# Patient Record
Sex: FEMALE | Race: WHITE | Employment: OTHER | URBAN - METROPOLITAN AREA
[De-identification: names, ages, dates, MRNs, and addresses within clinical notes are randomized per-mention and may not be internally consistent; named-entity substitution may affect disease eponyms.]

---

## 2017-01-05 NOTE — TELEPHONE ENCOUNTER
Patient called to get a refill on her Vyvanse. She only has 9 pills left and her next appt with Dr. Ramiro Matos is on 1/16/17. Either she will  prescription or her  Joaquim Alegre will. Please call patient when prescription is ready.  [phone]

## 2017-01-06 ENCOUNTER — TELEPHONE (OUTPATIENT)
Dept: NUTRITION/OBESITY MEDICINE | Facility: HOSPITAL | Age: 57
End: 2017-01-06

## 2017-01-16 ENCOUNTER — APPOINTMENT (OUTPATIENT)
Dept: NUTRITION/OBESITY MEDICINE | Facility: HOSPITAL | Age: 57
End: 2017-01-16
Attending: INTERNAL MEDICINE
Payer: MEDICARE

## 2017-01-24 ENCOUNTER — OFFICE VISIT (OUTPATIENT)
Dept: NUTRITION/OBESITY MEDICINE | Facility: HOSPITAL | Age: 57
End: 2017-01-24
Attending: INTERNAL MEDICINE
Payer: MEDICARE

## 2017-01-24 VITALS
DIASTOLIC BLOOD PRESSURE: 82 MMHG | HEIGHT: 61 IN | SYSTOLIC BLOOD PRESSURE: 111 MMHG | WEIGHT: 226 LBS | RESPIRATION RATE: 18 BRPM | HEART RATE: 96 BPM | BODY MASS INDEX: 42.67 KG/M2 | OXYGEN SATURATION: 96 %

## 2017-01-24 DIAGNOSIS — I10 HTN (HYPERTENSION), BENIGN: Primary | ICD-10-CM

## 2017-01-24 DIAGNOSIS — Z51.81 ENCOUNTER FOR THERAPEUTIC DRUG MONITORING: ICD-10-CM

## 2017-01-24 DIAGNOSIS — B37.2 INTERTRIGINOUS CANDIDIASIS: ICD-10-CM

## 2017-01-24 DIAGNOSIS — E66.01 MORBID OBESITY WITH BMI OF 45.0-49.9, ADULT (HCC): ICD-10-CM

## 2017-01-24 DIAGNOSIS — R53.82 CHRONIC FATIGUE: ICD-10-CM

## 2017-01-24 DIAGNOSIS — E78.2 MIXED HYPERLIPIDEMIA: ICD-10-CM

## 2017-01-24 DIAGNOSIS — K21.9 GASTROESOPHAGEAL REFLUX DISEASE WITHOUT ESOPHAGITIS: ICD-10-CM

## 2017-01-24 DIAGNOSIS — F50.81 BINGE EATING DISORDER: ICD-10-CM

## 2017-01-24 DIAGNOSIS — G47.33 OBSTRUCTIVE SLEEP APNEA SYNDROME: ICD-10-CM

## 2017-01-24 DIAGNOSIS — E55.9 VITAMIN D DEFICIENCY: ICD-10-CM

## 2017-01-24 PROCEDURE — 36415 COLL VENOUS BLD VENIPUNCTURE: CPT | Performed by: INTERNAL MEDICINE

## 2017-01-24 PROCEDURE — 99214 OFFICE O/P EST MOD 30 MIN: CPT | Performed by: INTERNAL MEDICINE

## 2017-01-24 PROCEDURE — 85025 COMPLETE CBC W/AUTO DIFF WBC: CPT | Performed by: INTERNAL MEDICINE

## 2017-01-24 PROCEDURE — 80053 COMPREHEN METABOLIC PANEL: CPT | Performed by: INTERNAL MEDICINE

## 2017-01-24 PROCEDURE — 87086 URINE CULTURE/COLONY COUNT: CPT | Performed by: INTERNAL MEDICINE

## 2017-01-24 PROCEDURE — 81001 URINALYSIS AUTO W/SCOPE: CPT | Performed by: INTERNAL MEDICINE

## 2017-01-24 RX ORDER — DEXTROAMPHETAMINE SULFATE 10 MG/1
30 TABLET ORAL DAILY
Qty: 180 TABLET | Refills: 0 | Status: SHIPPED | OUTPATIENT
Start: 2017-01-24 | End: 2017-01-24

## 2017-01-24 RX ORDER — NYSTATIN 100000 [USP'U]/G
1 POWDER TOPICAL 4 TIMES DAILY
Qty: 30 G | Refills: 1 | Status: SHIPPED | OUTPATIENT
Start: 2017-01-24 | End: 2018-08-13

## 2017-01-24 RX ORDER — DEXTROAMPHETAMINE SULFATE 10 MG/1
30 TABLET ORAL DAILY
Qty: 180 TABLET | Refills: 0 | Status: SHIPPED | OUTPATIENT
Start: 2017-02-24 | End: 2017-03-24

## 2017-01-24 NOTE — PROGRESS NOTES
[address] BARIATRIC AND WEIGHT LOSS CLINIC  [address] [place] [address]  Dept: [phone]     Date:   2016    Patient:  Jose L Holbrook  :      1960  MRN:      [id number]    Chief Complaint:  Patient presents with:   Ayan Garcia controled - has issue with edema in lower extremities   • Neuropathy (HCC)      toes and left foot   • Seizure disorder (HCC)      olefactory seizures history not since age 32   • Osteoarthritis      generalized   • Back problem      SPASMS HERNIATED DISC Sulfate IR 30 MG Oral Tab Take 1 tablet (30 mg total) by mouth every 4 (four) hours as needed. Disp: 120 tablet Rfl: 0   Lisdexamfetamine Dimesylate (VYVANSE) 50 MG Oral Cap Take 1 capsule (50 mg total) by mouth every morning.  Disp: 30 capsule Rfl: 0   alp by mouth nightly. Disp: 145 tablet Rfl: 3   Atorvastatin Calcium (LIPITOR) 20 MG Oral Tab Take 20 mg by mouth daily. Disp:  Rfl:      Allergies:  Latex; Tequin;  Adhesive Tape; Imitrex     Social History:      Social History   Marital Status:   Spo 1 Type of snacks: black berries  · Amount of soda consumption per day:  0  · Amount of water (in ounces) per day:  64  · Drinking between meals only:  yes  · Toughest challenge:  Exercise due to pain    Nutritional Goals  Limit carbohydrates to 100 gms per the last clinic visit. There has not been any increase in hyper-somnolence. DEPRESSION:  The patient states that her depression has been relatively well controlled.   she denies any recent changes in her irritability, concentration, sleep, libido or moo

## 2017-02-13 PROCEDURE — 86735 MUMPS ANTIBODY: CPT | Performed by: NURSE PRACTITIONER

## 2017-02-13 PROCEDURE — 36415 COLL VENOUS BLD VENIPUNCTURE: CPT | Performed by: NURSE PRACTITIONER

## 2017-03-24 ENCOUNTER — OFFICE VISIT (OUTPATIENT)
Dept: SURGERY | Facility: CLINIC | Age: 57
End: 2017-03-24

## 2017-03-24 VITALS
RESPIRATION RATE: 16 BRPM | DIASTOLIC BLOOD PRESSURE: 77 MMHG | BODY MASS INDEX: 44.2 KG/M2 | SYSTOLIC BLOOD PRESSURE: 130 MMHG | HEIGHT: 61 IN | WEIGHT: 234.13 LBS | OXYGEN SATURATION: 98 % | HEART RATE: 105 BPM

## 2017-03-24 DIAGNOSIS — K21.9 GASTROESOPHAGEAL REFLUX DISEASE WITHOUT ESOPHAGITIS: ICD-10-CM

## 2017-03-24 DIAGNOSIS — Z51.81 ENCOUNTER FOR THERAPEUTIC DRUG MONITORING: ICD-10-CM

## 2017-03-24 DIAGNOSIS — E78.2 MIXED HYPERLIPIDEMIA: ICD-10-CM

## 2017-03-24 DIAGNOSIS — Z99.89 OSA ON CPAP: ICD-10-CM

## 2017-03-24 DIAGNOSIS — I10 HTN (HYPERTENSION), BENIGN: Primary | ICD-10-CM

## 2017-03-24 DIAGNOSIS — R53.82 CHRONIC FATIGUE: ICD-10-CM

## 2017-03-24 DIAGNOSIS — E66.01 MORBID OBESITY WITH BMI OF 45.0-49.9, ADULT (HCC): ICD-10-CM

## 2017-03-24 DIAGNOSIS — G47.33 OSA ON CPAP: ICD-10-CM

## 2017-03-24 PROCEDURE — 99214 OFFICE O/P EST MOD 30 MIN: CPT | Performed by: INTERNAL MEDICINE

## 2017-03-24 RX ORDER — FENTANYL 25 UG/H
PATCH TRANSDERMAL
Refills: 0 | COMMUNITY
Start: 2017-02-09 | End: 2017-05-12

## 2017-03-24 RX ORDER — TRIAMCINOLONE ACETONIDE 0.1 %
PASTE (GRAM) DENTAL
Refills: 2 | COMMUNITY
Start: 2017-02-23 | End: 2017-05-12

## 2017-03-24 RX ORDER — DEXTROAMPHETAMINE SULFATE 10 MG/1
30 TABLET ORAL DAILY
Qty: 180 TABLET | Refills: 0 | Status: SHIPPED | OUTPATIENT
Start: 2017-03-24 | End: 2017-03-24

## 2017-04-04 PROBLEM — E78.2 MIXED HYPERLIPIDEMIA: Status: ACTIVE | Noted: 2017-04-04

## 2017-04-27 ENCOUNTER — HOSPITAL (OUTPATIENT)
Dept: OTHER | Age: 57
End: 2017-04-27
Attending: INTERNAL MEDICINE

## 2017-05-09 ENCOUNTER — OFFICE VISIT (OUTPATIENT)
Dept: SURGERY | Facility: CLINIC | Age: 57
End: 2017-05-09

## 2017-05-09 VITALS
HEIGHT: 61 IN | DIASTOLIC BLOOD PRESSURE: 80 MMHG | WEIGHT: 221 LBS | BODY MASS INDEX: 41.72 KG/M2 | SYSTOLIC BLOOD PRESSURE: 126 MMHG

## 2017-05-09 DIAGNOSIS — I10 HTN (HYPERTENSION), BENIGN: Primary | ICD-10-CM

## 2017-05-09 DIAGNOSIS — Z99.89 OSA ON CPAP: ICD-10-CM

## 2017-05-09 DIAGNOSIS — R53.82 CHRONIC FATIGUE: ICD-10-CM

## 2017-05-09 DIAGNOSIS — G47.33 OSA ON CPAP: ICD-10-CM

## 2017-05-09 DIAGNOSIS — E66.01 MORBID OBESITY WITH BMI OF 40.0-44.9, ADULT (HCC): ICD-10-CM

## 2017-05-09 DIAGNOSIS — E78.2 MIXED HYPERLIPIDEMIA: ICD-10-CM

## 2017-05-09 DIAGNOSIS — Z51.81 ENCOUNTER FOR THERAPEUTIC DRUG MONITORING: ICD-10-CM

## 2017-05-09 PROCEDURE — 99214 OFFICE O/P EST MOD 30 MIN: CPT | Performed by: INTERNAL MEDICINE

## 2017-05-09 RX ORDER — DEXTROAMPHETAMINE SULFATE 10 MG/1
30 TABLET ORAL DAILY
Qty: 90 TABLET | Refills: 0 | Status: SHIPPED | OUTPATIENT
Start: 2017-05-09 | End: 2017-05-09

## 2017-05-09 RX ORDER — DEXTROAMPHETAMINE SULFATE 10 MG/1
30 TABLET ORAL DAILY
Qty: 90 TABLET | Refills: 0 | Status: SHIPPED | OUTPATIENT
Start: 2017-06-09 | End: 2017-07-09

## 2017-05-09 NOTE — PROGRESS NOTES
[address] BARIATRIC AND WEIGHT LOSS CLINIC  [address] [place] [address]  Dept: [phone]     Date:   2016    Patient:  Chelsie Hdz  :      1960  MRN:      [id number]    Chief Complaint:  Patient presents with:   Ulysees Liming FAILURE        2014 being controled - has issue with edema in lower extremities   • Neuropathy (HCC)      toes and left foot   • Seizure disorder (HCC)      olefactory seizures history not since age 32   • Osteoarthritis      generalized   • Back problem Butalbital-APAP-Caffeine -40 MG Oral Tab Take 1 tablet by mouth every 6 (six) hours as needed for Pain. Disp: 50 tablet Rfl: 1   ValACYclovir HCl 500 MG Oral Tab Take 1 tablet (500 mg total) by mouth daily.  Disp: 30 tablet Rfl: 6   Prochlorperazine (four) times daily. Apply to affected areas Disp: 30 g Rfl: 1   Potassium Chloride ER 10 MEQ Oral Cap CR TK 4 CS PO QAM AND 2 CS QPM Disp:  Rfl: 5   Cholecalciferol (VITAMIN D) 1000 UNITS Oral Tab Take 1 capsule by mouth nightly.    Disp:  Rfl:    B Complex TONSILLECTOMY      OTHER SURGICAL HISTORY      Comment BILATERAL TOE joint REPLACEMENT-2006     OTHER SURGICAL HISTORY      Comment right shoulder aa/dcr/debridement rotator cuff     OTHER      Comment 2013, and 2014- deborah joint toes/ had bone grafts with s 30 minutes to complete each meal    Exercise Goals Reviewed and Discussed    Walk for  45 Minutes and Chair exercises for  45 Minutes    ROS:    Constitutional: positive for fatigue  Respiratory: positive for dyspnea on exertion  Cardiovascular: negative stable on the above medications. No interval change in antihypertensive medication. Patient was instructed to continue wearing their CPaP as recommended. Constipation: improving with meds. Goals for next month:  1. Keep a food log.   2. Drink 48

## 2017-05-12 PROCEDURE — 84075 ASSAY ALKALINE PHOSPHATASE: CPT | Performed by: INTERNAL MEDICINE

## 2017-05-12 PROCEDURE — 84080 ASSAY ALKALINE PHOSPHATASES: CPT | Performed by: INTERNAL MEDICINE

## 2017-05-18 PROBLEM — Z96.9 RETAINED ORTHOPEDIC HARDWARE: Status: ACTIVE | Noted: 2017-05-18

## 2017-05-18 RX ORDER — LINACLOTIDE 145 UG/1
CAPSULE, GELATIN COATED ORAL
Qty: 90 CAPSULE | Refills: 0 | OUTPATIENT
Start: 2017-05-18

## 2017-05-19 ENCOUNTER — TELEPHONE (OUTPATIENT)
Dept: SURGERY | Facility: CLINIC | Age: 57
End: 2017-05-19

## 2017-05-19 NOTE — TELEPHONE ENCOUNTER
Patient would like you to call pharmacy and authorize her prescription for Vyvanse to be filled before the refill date.   Patient is going out of town

## 2017-06-09 RX ORDER — SODIUM CHLORIDE, SODIUM LACTATE, POTASSIUM CHLORIDE, CALCIUM CHLORIDE 600; 310; 30; 20 MG/100ML; MG/100ML; MG/100ML; MG/100ML
INJECTION, SOLUTION INTRAVENOUS CONTINUOUS
Status: CANCELLED | OUTPATIENT
Start: 2017-06-09

## 2017-06-15 ENCOUNTER — PRIOR ORIGINAL RECORDS (OUTPATIENT)
Dept: OTHER | Age: 57
End: 2017-06-15

## 2017-06-15 PROCEDURE — 82607 VITAMIN B-12: CPT | Performed by: INTERNAL MEDICINE

## 2017-06-19 PROBLEM — I10 ESSENTIAL HYPERTENSION: Status: ACTIVE | Noted: 2017-06-19

## 2017-06-19 PROBLEM — G47.33 OSA ON CPAP: Status: RESOLVED | Noted: 2017-03-24 | Resolved: 2017-06-19

## 2017-06-19 PROBLEM — Z99.89 OSA ON CPAP: Status: RESOLVED | Noted: 2017-03-24 | Resolved: 2017-06-19

## 2017-06-19 NOTE — OR NURSING
MARC received faxed copy of EKG but cannot read patient name and no date of service is provided. MD office is now closed. Will order EKG stat on admit. EKG was received on another fax machine and can now read patient name and date of service.   EKG stat o

## 2017-06-20 ENCOUNTER — SURGERY (OUTPATIENT)
Age: 57
End: 2017-06-20

## 2017-06-20 ENCOUNTER — ANESTHESIA (OUTPATIENT)
Dept: SURGERY | Facility: HOSPITAL | Age: 57
End: 2017-06-20
Payer: MEDICARE

## 2017-06-20 ENCOUNTER — ANESTHESIA EVENT (OUTPATIENT)
Dept: SURGERY | Facility: HOSPITAL | Age: 57
End: 2017-06-20
Payer: MEDICARE

## 2017-06-20 ENCOUNTER — HOSPITAL ENCOUNTER (OUTPATIENT)
Facility: HOSPITAL | Age: 57
Setting detail: HOSPITAL OUTPATIENT SURGERY
Discharge: HOME | End: 2017-06-20
Attending: ORTHOPAEDIC SURGERY | Admitting: ORTHOPAEDIC SURGERY
Payer: MEDICARE

## 2017-06-20 ENCOUNTER — APPOINTMENT (OUTPATIENT)
Dept: GENERAL RADIOLOGY | Facility: HOSPITAL | Age: 57
End: 2017-06-20
Attending: ORTHOPAEDIC SURGERY
Payer: MEDICARE

## 2017-06-20 VITALS
RESPIRATION RATE: 16 BRPM | WEIGHT: 220 LBS | OXYGEN SATURATION: 96 % | SYSTOLIC BLOOD PRESSURE: 96 MMHG | HEIGHT: 61.5 IN | HEART RATE: 78 BPM | BODY MASS INDEX: 41.01 KG/M2 | TEMPERATURE: 98 F | DIASTOLIC BLOOD PRESSURE: 53 MMHG

## 2017-06-20 PROCEDURE — 76000 FLUOROSCOPY <1 HR PHYS/QHP: CPT | Performed by: ORTHOPAEDIC SURGERY

## 2017-06-20 PROCEDURE — 82962 GLUCOSE BLOOD TEST: CPT

## 2017-06-20 PROCEDURE — 0SPM04Z REMOVAL OF INTERNAL FIXATION DEVICE FROM RIGHT METATARSAL-PHALANGEAL JOINT, OPEN APPROACH: ICD-10-PCS | Performed by: ORTHOPAEDIC SURGERY

## 2017-06-20 RX ORDER — BUPIVACAINE HYDROCHLORIDE 5 MG/ML
INJECTION, SOLUTION EPIDURAL; INTRACAUDAL AS NEEDED
Status: DISCONTINUED | OUTPATIENT
Start: 2017-06-20 | End: 2017-06-20 | Stop reason: HOSPADM

## 2017-06-20 RX ORDER — HYDROCODONE BITARTRATE AND ACETAMINOPHEN 5; 325 MG/1; MG/1
2 TABLET ORAL AS NEEDED
Status: DISCONTINUED | OUTPATIENT
Start: 2017-06-20 | End: 2017-06-20

## 2017-06-20 RX ORDER — SODIUM CHLORIDE, SODIUM LACTATE, POTASSIUM CHLORIDE, CALCIUM CHLORIDE 600; 310; 30; 20 MG/100ML; MG/100ML; MG/100ML; MG/100ML
INJECTION, SOLUTION INTRAVENOUS CONTINUOUS
Status: DISCONTINUED | OUTPATIENT
Start: 2017-06-20 | End: 2017-06-20

## 2017-06-20 RX ORDER — DIPHENHYDRAMINE HYDROCHLORIDE 50 MG/ML
12.5 INJECTION INTRAMUSCULAR; INTRAVENOUS AS NEEDED
Status: DISCONTINUED | OUTPATIENT
Start: 2017-06-20 | End: 2017-06-20

## 2017-06-20 RX ORDER — ONDANSETRON 2 MG/ML
INJECTION INTRAMUSCULAR; INTRAVENOUS
Status: COMPLETED
Start: 2017-06-20 | End: 2017-06-20

## 2017-06-20 RX ORDER — ACETAMINOPHEN 500 MG
1000 TABLET ORAL ONCE AS NEEDED
Status: DISCONTINUED | OUTPATIENT
Start: 2017-06-20 | End: 2017-06-20

## 2017-06-20 RX ORDER — HYDROMORPHONE HYDROCHLORIDE 1 MG/ML
0.4 INJECTION, SOLUTION INTRAMUSCULAR; INTRAVENOUS; SUBCUTANEOUS EVERY 5 MIN PRN
Status: DISCONTINUED | OUTPATIENT
Start: 2017-06-20 | End: 2017-06-20

## 2017-06-20 RX ORDER — OXYCODONE AND ACETAMINOPHEN 10; 325 MG/1; MG/1
2 TABLET ORAL EVERY 4 HOURS PRN
Qty: 60 TABLET | Refills: 0 | Status: SHIPPED | OUTPATIENT
Start: 2017-06-20 | End: 2017-10-05

## 2017-06-20 RX ORDER — MEPERIDINE HYDROCHLORIDE 25 MG/ML
12.5 INJECTION INTRAMUSCULAR; INTRAVENOUS; SUBCUTANEOUS AS NEEDED
Status: DISCONTINUED | OUTPATIENT
Start: 2017-06-20 | End: 2017-06-20

## 2017-06-20 RX ORDER — ONDANSETRON 2 MG/ML
4 INJECTION INTRAMUSCULAR; INTRAVENOUS AS NEEDED
Status: DISCONTINUED | OUTPATIENT
Start: 2017-06-20 | End: 2017-06-20

## 2017-06-20 RX ORDER — NALOXONE HYDROCHLORIDE 0.4 MG/ML
80 INJECTION, SOLUTION INTRAMUSCULAR; INTRAVENOUS; SUBCUTANEOUS AS NEEDED
Status: DISCONTINUED | OUTPATIENT
Start: 2017-06-20 | End: 2017-06-20

## 2017-06-20 RX ORDER — MIDAZOLAM HYDROCHLORIDE 1 MG/ML
1 INJECTION INTRAMUSCULAR; INTRAVENOUS EVERY 5 MIN PRN
Status: DISCONTINUED | OUTPATIENT
Start: 2017-06-20 | End: 2017-06-20

## 2017-06-20 RX ORDER — HYDROCODONE BITARTRATE AND ACETAMINOPHEN 5; 325 MG/1; MG/1
1 TABLET ORAL AS NEEDED
Status: DISCONTINUED | OUTPATIENT
Start: 2017-06-20 | End: 2017-06-20

## 2017-06-20 NOTE — ANESTHESIA POSTPROCEDURE EVALUATION
[facility]    Olivia Jensen Patient Status:  Hospital Outpatient Surgery   Age/Gender 62year old female MRN [id number]   Location [address] [facility] Attending Raheel Payton MD   Hosp Day # 0 PCP Mata Mohan MD

## 2017-06-20 NOTE — OPERATIVE REPORT
[facility]    PATIENT'S NAME: Bryn Swartz   ATTENDING PHYSICIAN: Nelida Kulkarni M.D. OPERATING PHYSICIAN: Nelida Kulkarni M.D.    PATIENT ACCOUNT#:   [de-identified]    LOCATION:  [address]  MEDICAL RECORD #:   [id number]       DATE

## 2017-06-20 NOTE — BRIEF OP NOTE
Pre-Operative Diagnosis: RIGHT FOOT GREAT TOE RETAINED HARDWARE     Post-Operative Diagnosis: right foot great toe retained hardware     Procedure Performed:   Procedure(s):  RIGHT FOOT GREAT TOE REMOVAL OF HARDWARE    Surgeon(s) and Role:     Marcel Hodges,

## 2017-06-20 NOTE — ANESTHESIA PREPROCEDURE EVALUATION
PRE-OP EVALUATION    Patient Name: Padmini Mccormick    Pre-op Diagnosis: RIGHT FOOT GREAT TOE RETAINED HARDWARE    Procedure(s):  RIGHT FOOT GREAT TOE REMOVAL OF HARDWARE    Surgeon(s) and Role:     Nakia Beckman MD - Primary    Pre-op vitals reviewed. tablet (100 mg total) by mouth nightly. Disp: 90 tablet Rfl: 1   DULoxetine HCl (CYMBALTA) 30 MG Oral Cap DR Particles Take 1 capsule (30 mg total) by mouth daily.  Disp: 90 capsule Rfl: 1   gabapentin 300 MG Oral Cap Take 1 capsule (300 mg total) by mouth GI/Hepatic/Renal      (+) GERD                           Cardiovascular      ECG reviewed.           (+) obesity  (+) hypertension   (+) hyperlipidemia                  (+) CHF                Endo/Other                                  Pulmonary Dental             Pulmonary    Pulmonary exam normal.                 Other findings            ASA: 3   Plan: general   patient meets guidelines. Post-procedure pain management plan discussed with surgeon and patient.       Plan/risks discussed with:

## 2017-07-06 PROCEDURE — 86235 NUCLEAR ANTIGEN ANTIBODY: CPT | Performed by: INTERNAL MEDICINE

## 2017-07-06 PROCEDURE — 36415 COLL VENOUS BLD VENIPUNCTURE: CPT | Performed by: INTERNAL MEDICINE

## 2017-07-12 LAB
BUN: 21 MG/DL
CALCIUM: 8.8 MG/DL
CHLORIDE: 102 MEQ/L
CREATININE, SERUM: 0.89 MG/DL
GLUCOSE: 94 MG/DL
POTASSIUM, SERUM: 3.9 MEQ/L
SODIUM: 142 MEQ/L

## 2017-07-27 ENCOUNTER — TELEPHONE (OUTPATIENT)
Dept: SURGERY | Facility: CLINIC | Age: 57
End: 2017-07-27

## 2017-07-27 DIAGNOSIS — R53.82 CHRONIC FATIGUE: ICD-10-CM

## 2017-07-27 RX ORDER — DEXTROAMPHETAMINE SULFATE 10 MG/1
30 TABLET ORAL DAILY
Qty: 90 TABLET | Refills: 0 | Status: SHIPPED | OUTPATIENT
Start: 2017-07-27 | End: 2017-08-08

## 2017-07-27 NOTE — TELEPHONE ENCOUNTER
Patient has appt on August 2 with Dr. Luis Manuel Lehman but is out of medication. She is requesting a refill on both the Vyvanse & Dextroamphetamine. She would like to pick them later today or tomorrow. Please contact patient when ready for .

## 2017-08-08 ENCOUNTER — OFFICE VISIT (OUTPATIENT)
Dept: SURGERY | Facility: CLINIC | Age: 57
End: 2017-08-08

## 2017-08-08 VITALS
OXYGEN SATURATION: 100 % | RESPIRATION RATE: 16 BRPM | SYSTOLIC BLOOD PRESSURE: 121 MMHG | DIASTOLIC BLOOD PRESSURE: 75 MMHG | HEIGHT: 61.5 IN | HEART RATE: 99 BPM | BODY MASS INDEX: 41.59 KG/M2 | WEIGHT: 223.13 LBS

## 2017-08-08 DIAGNOSIS — R53.82 CHRONIC FATIGUE: ICD-10-CM

## 2017-08-08 PROCEDURE — 99214 OFFICE O/P EST MOD 30 MIN: CPT | Performed by: INTERNAL MEDICINE

## 2017-08-08 RX ORDER — DEXTROAMPHETAMINE SULFATE 10 MG/1
30 TABLET ORAL DAILY
Qty: 90 TABLET | Refills: 0 | Status: SHIPPED | OUTPATIENT
Start: 2017-09-24 | End: 2017-08-21

## 2017-08-08 RX ORDER — DEXTROAMPHETAMINE SULFATE 10 MG/1
30 TABLET ORAL DAILY
Qty: 90 TABLET | Refills: 0 | Status: SHIPPED | OUTPATIENT
Start: 2017-08-26 | End: 2017-08-08

## 2017-08-08 NOTE — PROGRESS NOTES
[facility] BARIATRIC AND WEIGHT LOSS CLINIC  [address] [address]  Dept: [phone]     Date:   2016    Patient:  Duke Siegel  :      1960  MRN:      [id number]    Chief Complaint:  Patient presents with:   Haven Goldman Osteoarthritis     generalized   • Other and unspecified hyperlipidemia    • Pancreatic divisum 2/24/2015    MRCP 2/2015   • Pneumonia, organism unspecified(486)    • Pre-diabetes    • Seizure disorder ([facility] [address]     olefactory seizures history not since age 32 mouth every 4 (four) hours as needed for Pain. Disp: 60 tablet Rfl: 0   aspirin 325 MG Oral Tab EC Take 325 mg by mouth daily. Disp:  Rfl:    morphINE Sulfate IR 30 MG Oral Tab Take 1 tablet (30 mg total) by mouth every 4 (four) hours as needed.  Disp: 120 daily. Disp: 30 tablet Rfl: 6   Lidocaine Viscous 2 % Mouth/Throat Solution SWISH AND SWALLOW OR SPIT 5 TO 15 ML PO Q 6 TO 8 H PRF MOUTH PAIN Disp:  Rfl: 2   VOLTAREN 1 % Transdermal Gel APPLY 4 GRAMS EXTERNALLY TO THE AFFECTED AREA FOUR TIMES DAILY Disp: ARTHROSCOPY OF JOINT UNLISTED Bilateral      Comment: right shoulder and both knees  1994 x2, 1992 x 1:       Comment: total of 3  No date: OTHER      Comment: , and - deborah joint toes/ had bone                grafts with stem cells recreate minutes before or after meals, Utlize portion control strategies to reduce calorie intake, Identify triggers for eating and manage cues and Eat slowly and take 20 to 30 minutes to complete each meal    Exercise Goals Reviewed and Discussed    Walk for  45 stable on the above medications. No interval change in antihypertensive medication. Patient was instructed to continue wearing their CPaP as recommended. Constipation: improving with meds. Goals for next month:  1. Keep a food log.   2. Drink 48

## 2017-08-21 ENCOUNTER — TELEPHONE (OUTPATIENT)
Dept: SURGERY | Facility: CLINIC | Age: 57
End: 2017-08-21

## 2017-08-21 DIAGNOSIS — R53.82 CHRONIC FATIGUE: ICD-10-CM

## 2017-08-21 RX ORDER — DEXTROAMPHETAMINE SULFATE 10 MG/1
30 TABLET ORAL DAILY
Qty: 90 TABLET | Refills: 0 | Status: SHIPPED | OUTPATIENT
Start: 2017-08-21 | End: 2017-09-20

## 2017-08-21 NOTE — TELEPHONE ENCOUNTER
Patient will be leaving tomorrow for a couple of weeks and would like her prescription filled today. Patient has prescriptions dated to be refilled 26 Aug. 2017.

## 2017-10-05 ENCOUNTER — PRIOR ORIGINAL RECORDS (OUTPATIENT)
Dept: OTHER | Age: 57
End: 2017-10-05

## 2017-10-05 PROBLEM — R11.0 NAUSEA: Status: ACTIVE | Noted: 2017-10-05

## 2017-10-05 PROBLEM — Z51.81 ENCOUNTER FOR THERAPEUTIC DRUG MONITORING: Status: RESOLVED | Noted: 2017-01-24 | Resolved: 2017-10-05

## 2017-10-05 PROCEDURE — 83970 ASSAY OF PARATHORMONE: CPT | Performed by: INTERNAL MEDICINE

## 2017-10-05 PROCEDURE — 81003 URINALYSIS AUTO W/O SCOPE: CPT | Performed by: NURSE PRACTITIONER

## 2017-11-09 ENCOUNTER — OFFICE VISIT (OUTPATIENT)
Dept: SURGERY | Facility: CLINIC | Age: 57
End: 2017-11-09

## 2017-11-09 VITALS
DIASTOLIC BLOOD PRESSURE: 94 MMHG | TEMPERATURE: 98 F | RESPIRATION RATE: 18 BRPM | BODY MASS INDEX: 43.71 KG/M2 | WEIGHT: 234.5 LBS | OXYGEN SATURATION: 98 % | HEIGHT: 61.5 IN | HEART RATE: 113 BPM | SYSTOLIC BLOOD PRESSURE: 142 MMHG

## 2017-11-09 DIAGNOSIS — G47.33 OBSTRUCTIVE SLEEP APNEA SYNDROME: ICD-10-CM

## 2017-11-09 DIAGNOSIS — R63.2 BINGE EATING: ICD-10-CM

## 2017-11-09 DIAGNOSIS — E66.01 MORBID OBESITY WITH BMI OF 40.0-44.9, ADULT (HCC): ICD-10-CM

## 2017-11-09 DIAGNOSIS — E78.2 MIXED HYPERLIPIDEMIA: ICD-10-CM

## 2017-11-09 DIAGNOSIS — I10 ESSENTIAL HYPERTENSION: Primary | ICD-10-CM

## 2017-11-09 PROCEDURE — 99214 OFFICE O/P EST MOD 30 MIN: CPT | Performed by: INTERNAL MEDICINE

## 2017-11-09 NOTE — PROGRESS NOTES
[facility] BARIATRIC AND WEIGHT LOSS CLINIC  [address] [address]  Dept: [phone]     Date:   2016    Patient:  Trenton Chavez  :      1960  MRN:      [id number]    Chief Complaint:  Patient presents with:   Krystle Pleva hyperlipidemia    • Pancreatic divisum 2/24/2015    MRCP 2/2015   • Pneumonia, organism unspecified(486)    • Pre-diabetes    • Seizure disorder (HCC)     olefactory seizures history not since age 32   • Spondylosis of cervical region without myelopathy or MG Oral Cap Take 1 capsule (60 mg total) by mouth daily. Disp: 30 capsule Rfl: 0   [START ON 12/23/2017] Lisdexamfetamine Dimesylate (VYVANSE) 60 MG Oral Cap Take 1 capsule (60 mg total) by mouth daily.  Disp: 30 capsule Rfl: 0   Dextroamphetamine Sulfate E Cleanse scalp daily, leave on for 5 min then rinse off Disp: 120 mL Rfl: 5   Fluocinolone Acetonide (DERMA-SMOOTHE/FS SCALP) 0.01 % External Oil Apply to Scalp and Ears daily x 1 week, then PRN Disp: 118 mL Rfl: 2   Linaclotide (LINZESS) 145 MCG Oral Cap T MG Oral Tab Take 40 mg by mouth every morning. Disp:  Rfl:    torsemide (DEMADEX) 20 MG Oral Tab Take 20 mg by mouth nightly. Disp: 145 tablet Rfl: 3   Atorvastatin Calcium (LIPITOR) 20 MG Oral Tab Take 20 mg by mouth daily.  Disp:  Rfl:      Allergies: Journal?: yes   · Patient is reading nutrition labels? yes  · Average Caloric Intake:     · Average CHO Intake: <100  · Is patient exercising?  yes  · Type of exercise? adl's    Eating Habits  · Patient states the following:  · Eats 3 meal(s) per day  · Zeke Ya symmetric  Skin: skin lesions noted over arms, redness under pannus      ASSESSMENT     HYPERTENSION:  The patient's blood pressure has been well controlled. she has been checking it as instructed and has remained in relatively good control.     OBSTRUCTIV

## 2017-11-13 ENCOUNTER — PRIOR ORIGINAL RECORDS (OUTPATIENT)
Dept: OTHER | Age: 57
End: 2017-11-13

## 2017-11-13 PROCEDURE — 83970 ASSAY OF PARATHORMONE: CPT | Performed by: INTERNAL MEDICINE

## 2017-11-13 PROCEDURE — 82330 ASSAY OF CALCIUM: CPT | Performed by: INTERNAL MEDICINE

## 2017-11-28 ENCOUNTER — PRIOR ORIGINAL RECORDS (OUTPATIENT)
Dept: OTHER | Age: 57
End: 2017-11-28

## 2017-11-30 LAB
ALBUMIN: 3.8 G/DL
ALKALINE PHOSPHATATE(ALK PHOS): 124 IU/L
BILIRUBIN TOTAL: 0.25 MG/DL
BUN: 21 MG/DL
CALCIUM: 9.1 MG/DL
CHLORIDE: 101 MEQ/L
CREATININE, SERUM: 0.86 MG/DL
GLUCOSE: 102 MG/DL
HEMATOCRIT: 38.4 %
HEMOGLOBIN: 11.5 G/DL
PLATELETS: 251 K/UL
POTASSIUM, SERUM: 4.4 MEQ/L
PROTEIN, TOTAL: 7.4 G/DL
PTH, INTACT: 108.5 PG/ML
PTH, INTACT: 64.6 PG/ML
RED BLOOD COUNT: 4.76 X 10-6/U
SGOT (AST): 31 IU/L
SGPT (ALT): 30 IU/L
SODIUM: 136 MEQ/L
VITAMIN D 25-OH: 30.69 NG/ML
WHITE BLOOD COUNT: 4.93 X 10-3/U

## 2017-12-18 RX ORDER — LINACLOTIDE 145 UG/1
CAPSULE, GELATIN COATED ORAL
Qty: 90 CAPSULE | Refills: 0 | Status: SHIPPED | OUTPATIENT
Start: 2017-12-18 | End: 2018-01-08

## 2018-01-08 RX ORDER — LINACLOTIDE 145 UG/1
CAPSULE, GELATIN COATED ORAL
Qty: 30 CAPSULE | Refills: 0 | OUTPATIENT
Start: 2018-01-08

## 2018-01-24 ENCOUNTER — TELEPHONE (OUTPATIENT)
Dept: SURGERY | Facility: CLINIC | Age: 58
End: 2018-01-24

## 2018-01-26 NOTE — TELEPHONE ENCOUNTER
Attempted to contact patient in regards to scheduling a follow up appointment with Dr. Casimiro Aldana.     Voicemail full - Unable to leave message

## 2018-01-30 ENCOUNTER — OFFICE VISIT (OUTPATIENT)
Dept: SURGERY | Facility: CLINIC | Age: 58
End: 2018-01-30

## 2018-01-30 VITALS
SYSTOLIC BLOOD PRESSURE: 130 MMHG | HEIGHT: 61.5 IN | BODY MASS INDEX: 43.99 KG/M2 | WEIGHT: 236 LBS | DIASTOLIC BLOOD PRESSURE: 70 MMHG

## 2018-01-30 DIAGNOSIS — F33.2 SEVERE RECURRENT MAJOR DEPRESSION WITHOUT PSYCHOTIC FEATURES (HCC): ICD-10-CM

## 2018-01-30 DIAGNOSIS — E66.01 MORBID OBESITY WITH BMI OF 40.0-44.9, ADULT (HCC): ICD-10-CM

## 2018-01-30 DIAGNOSIS — G47.33 OBSTRUCTIVE SLEEP APNEA SYNDROME: ICD-10-CM

## 2018-01-30 DIAGNOSIS — K21.9 GASTROESOPHAGEAL REFLUX DISEASE WITHOUT ESOPHAGITIS: ICD-10-CM

## 2018-01-30 DIAGNOSIS — R63.2 BINGE EATING: ICD-10-CM

## 2018-01-30 DIAGNOSIS — I10 ESSENTIAL HYPERTENSION: Primary | ICD-10-CM

## 2018-01-30 DIAGNOSIS — E78.2 MIXED HYPERLIPIDEMIA: ICD-10-CM

## 2018-01-30 PROCEDURE — 99214 OFFICE O/P EST MOD 30 MIN: CPT | Performed by: INTERNAL MEDICINE

## 2018-01-30 RX ORDER — DEXTROAMPHETAMINE SULFATE 10 MG/1
CAPSULE, EXTENDED RELEASE ORAL
Qty: 60 CAPSULE | Refills: 1 | Status: SHIPPED | OUTPATIENT
Start: 2018-01-30 | End: 2018-01-30

## 2018-01-30 NOTE — PROGRESS NOTES
[address] BARIATRIC AND WEIGHT LOSS CLINIC  [address] [place] [address]  Dept: [phone]     Date:   2016    Patient:  Mar Chinchilla  :      1960  MRN:      [id number]    Chief Complaint:  Patient presents with:   Pamplin March Osteoarthritis     generalized   • Other and unspecified hyperlipidemia    • Pancreatic divisum 2/24/2015    MRCP 2/2015   • Pneumonia, organism unspecified(486)    • Pre-diabetes    • Seizure disorder ([facility] [address]     olefactory seizures history not since age 32 Rfl: 0   colchicine (COLCRYS) 0.6 MG Oral Tab Take 1 tablet (0.6 mg total) by mouth 2 (two) times daily. Disp: 180 tablet Rfl: 0   ALPRAZolam 1 MG Oral Tab Take 1 tablet (1 mg total) by mouth every 4 to 6 hours as needed for Sleep or Anxiety.  Disp: 90 tab External Powder Apply 1 Application topically 4 (four) times daily.  Apply to affected areas Disp: 30 g Rfl: 1   Potassium Chloride ER 10 MEQ Oral Cap CR TK 4 CS PO QAM AND 2 CS QPM Disp:  Rfl: 5   Cholecalciferol (VITAMIN D) 1000 UNITS Oral Tab Take 1 caps surgery on left foot with                hardware then it was removed  No date: OTHER Right      Comment: tkr  No date: OTHER SURGICAL HISTORY      Comment: BILATERAL TOE joint REPLACEMENT-2006   No date: OTHER SURGICAL HISTORY      Comment: right shoulder negative  Gastrointestinal: positive for constipation  Musculoskeletal:positive for arthralgias and back pain  Neurological: negative  Behavioral/Psych: negative  Endocrine: negative  All other systems were reviewed and are negative    Physical Exam:   Gen for next month:  1. Keep a food log. 2. Drink 48-64 ounces of non-caloric beverages per day. No fruit juices or regular soda. 3. Increase activity-upper body exercises, walk 10 minutes per day. 4. Increase fruit and vegetable servings to 5-6 per day.

## 2018-02-01 ENCOUNTER — TELEPHONE (OUTPATIENT)
Dept: SURGERY | Facility: CLINIC | Age: 58
End: 2018-02-01

## 2018-02-01 RX ORDER — DEXTROAMPHETAMINE SULFATE 10 MG/1
20 TABLET ORAL DAILY
Qty: 60 TABLET | Refills: 0 | Status: SHIPPED | OUTPATIENT
Start: 2018-02-01 | End: 2018-04-02

## 2018-02-01 RX ORDER — DEXTROAMPHETAMINE SULFATE 10 MG/1
20 TABLET ORAL DAILY
Qty: 60 TABLET | Refills: 0 | Status: SHIPPED | OUTPATIENT
Start: 2018-03-01 | End: 2018-04-02

## 2018-02-01 NOTE — TELEPHONE ENCOUNTER
Dr. Manny Black,   Can you please generate a new prescription for Dextroamphetamine Sulfate ER (DEXEDRINE) 10 MG  Patient needs tablets, not capsules. She can  this afternoon around 2:30pm. Thank you.

## 2018-02-21 PROBLEM — M18.0 ARTHRITIS OF CARPOMETACARPAL (CMC) JOINT OF BOTH THUMBS: Status: ACTIVE | Noted: 2018-02-21

## 2018-03-13 ENCOUNTER — TELEPHONE (OUTPATIENT)
Dept: SURGERY | Facility: CLINIC | Age: 58
End: 2018-03-13

## 2018-03-13 NOTE — TELEPHONE ENCOUNTER
Spoke to patient about refill of dexidrine. I recommended that she gets refill from her psychiatrist since it is for ADD.     Patient understood plan and will contact her psychiatrist.

## 2018-04-02 ENCOUNTER — OFFICE VISIT (OUTPATIENT)
Dept: SURGERY | Facility: CLINIC | Age: 58
End: 2018-04-02

## 2018-04-02 VITALS
SYSTOLIC BLOOD PRESSURE: 120 MMHG | HEIGHT: 61.5 IN | RESPIRATION RATE: 16 BRPM | WEIGHT: 234.06 LBS | OXYGEN SATURATION: 100 % | BODY MASS INDEX: 43.63 KG/M2 | HEART RATE: 85 BPM | DIASTOLIC BLOOD PRESSURE: 84 MMHG

## 2018-04-02 DIAGNOSIS — Z51.81 ENCOUNTER FOR THERAPEUTIC DRUG MONITORING: ICD-10-CM

## 2018-04-02 DIAGNOSIS — E78.2 MIXED HYPERLIPIDEMIA: ICD-10-CM

## 2018-04-02 DIAGNOSIS — I10 ESSENTIAL HYPERTENSION: Primary | ICD-10-CM

## 2018-04-02 DIAGNOSIS — R63.2 BINGE EATING: ICD-10-CM

## 2018-04-02 DIAGNOSIS — E66.01 MORBID OBESITY WITH BMI OF 40.0-44.9, ADULT (HCC): ICD-10-CM

## 2018-04-02 PROCEDURE — 99214 OFFICE O/P EST MOD 30 MIN: CPT | Performed by: INTERNAL MEDICINE

## 2018-04-02 NOTE — PROGRESS NOTES
[facility] BARIATRIC AND WEIGHT LOSS CLINIC  [address] [address]  Dept: [phone]     Date:   2016    Patient:  Marguerite Schulte  :      1960  MRN:      [id number]    Chief Complaint:  Patient presents with:   Scott Monzon Osteoarthritis     generalized   • Other and unspecified hyperlipidemia    • Pancreatic divisum 2/24/2015    MRCP 2/2015   • Pneumonia, organism unspecified(486)    • Pre-diabetes    • Seizure disorder ([facility] [address]     olefactory seizures history not since age 32 sparingly to rash i nears once a day as needed for up to 3 weeks on then 1 week off.  Disp: 80 g Rfl: 2   VALACYCLOVIR  MG Oral Tab TAKE 1 TABLET(500 MG) BY MOUTH DAILY Disp: 30 tablet Rfl: 5   Mupirocin Calcium 2 % External Cream APPLY BID TO EACH E 2   VOLTAREN 1 % Transdermal Gel APPLY 4 GRAMS EXTERNALLY TO THE AFFECTED AREA FOUR TIMES DAILY Disp: 100 g Rfl: 0   FREESTYLE LITE TEST In Vitro Strip TEST SUGARS EVERY MORNING, ALTERNATING WITH 2 HOURS POST PRANDIAL DAILY Disp: 150 strip Rfl: 0   Nystati recreated both toes/      had numerous surgeries: OTHER      Comment: Great toe replacements removed and then                reconstructed   No date: OTHER      Comment: reconstructive surgery on left foot with                hardware then it was removed 39 Minutes and Chair exercises for  45 Minutes    ROS:    Constitutional: positive for fatigue  Respiratory: positive for dyspnea on exertion  Cardiovascular: negative  Gastrointestinal: positive for constipation  Musculoskeletal:positive for arthralgias a Patient was instructed to continue wearing their CPaP as recommended. Constipation: improving with meds. Goals for next month:  1. Keep a food log. 2. Drink 48-64 ounces of non-caloric beverages per day. No fruit juices or regular soda. 3.  In

## 2018-04-10 ENCOUNTER — PRIOR ORIGINAL RECORDS (OUTPATIENT)
Dept: OTHER | Age: 58
End: 2018-04-10

## 2018-05-08 PROBLEM — M51.36 DDD (DEGENERATIVE DISC DISEASE), LUMBAR: Status: ACTIVE | Noted: 2018-05-08

## 2018-05-08 PROBLEM — M51.369 DDD (DEGENERATIVE DISC DISEASE), LUMBAR: Status: ACTIVE | Noted: 2018-05-08

## 2018-05-08 PROBLEM — M24.28 LIGAMENTUM FLAVUM HYPERTROPHY: Status: ACTIVE | Noted: 2018-05-08

## 2018-05-08 PROBLEM — M48.062 SPINAL STENOSIS OF LUMBAR REGION WITH NEUROGENIC CLAUDICATION: Status: ACTIVE | Noted: 2018-05-08

## 2018-05-08 PROBLEM — M43.10 SPONDYLOLISTHESIS, GRADE 1: Status: ACTIVE | Noted: 2018-05-08

## 2018-05-08 PROBLEM — M43.16 SPONDYLOLISTHESIS AT L4-L5 LEVEL: Status: ACTIVE | Noted: 2018-05-08

## 2018-05-08 PROBLEM — M71.38 SYNOVIAL CYST OF LUMBAR FACET JOINT: Status: ACTIVE | Noted: 2018-05-08

## 2018-05-08 PROBLEM — M47.816 LUMBAR SPONDYLOSIS: Status: ACTIVE | Noted: 2018-05-08

## 2018-05-14 PROCEDURE — 84075 ASSAY ALKALINE PHOSPHATASE: CPT | Performed by: INTERNAL MEDICINE

## 2018-05-14 PROCEDURE — 83970 ASSAY OF PARATHORMONE: CPT | Performed by: INTERNAL MEDICINE

## 2018-05-14 PROCEDURE — 84080 ASSAY ALKALINE PHOSPHATASES: CPT | Performed by: INTERNAL MEDICINE

## 2018-06-04 ENCOUNTER — OFFICE VISIT (OUTPATIENT)
Dept: SURGERY | Facility: CLINIC | Age: 58
End: 2018-06-04

## 2018-06-04 VITALS
OXYGEN SATURATION: 98 % | SYSTOLIC BLOOD PRESSURE: 143 MMHG | WEIGHT: 229.38 LBS | HEIGHT: 61.5 IN | BODY MASS INDEX: 42.75 KG/M2 | HEART RATE: 80 BPM | RESPIRATION RATE: 20 BRPM | DIASTOLIC BLOOD PRESSURE: 85 MMHG

## 2018-06-04 DIAGNOSIS — E78.2 MIXED HYPERLIPIDEMIA: ICD-10-CM

## 2018-06-04 DIAGNOSIS — E66.01 MORBID OBESITY WITH BMI OF 40.0-44.9, ADULT (HCC): ICD-10-CM

## 2018-06-04 DIAGNOSIS — I10 ESSENTIAL HYPERTENSION: Primary | ICD-10-CM

## 2018-06-04 DIAGNOSIS — R63.2 BINGE EATING: ICD-10-CM

## 2018-06-04 DIAGNOSIS — G47.33 OBSTRUCTIVE SLEEP APNEA SYNDROME: ICD-10-CM

## 2018-06-04 PROCEDURE — 99214 OFFICE O/P EST MOD 30 MIN: CPT | Performed by: INTERNAL MEDICINE

## 2018-06-04 NOTE — PROGRESS NOTES
[address] BARIATRIC AND WEIGHT LOSS CLINIC  [address] [place] [address]  Dept: [phone]     Date:   2016    Patient:  Felix Jauregui  :      1960  MRN:      [id number]    Chief Complaint:  Patient presents with:   Ciera Baez • Other and unspecified hyperlipidemia    • Pancreatic divisum 2/24/2015    MRCP 2/2015   • Pneumonia, organism unspecified(486)    • Pre-diabetes    • Seizure disorder (HCC)     olefactory seizures history not since age 32   • Spondylosis of cervical re Pedrito Catalan [id number] Disp: 180 tablet Rfl: 0   OMEPRAZOLE 40 MG Oral Capsule Delayed Release TAKE 1 CAPSULE BY MOUTH DAILY Disp: 90 capsule Rfl: 2   Diclofenac Sodium (VOLTAREN) 1 % Transdermal Gel Apply 4 g topically 4 (four) times daily.  Disp: 1 Tube Rfl Solution SWISH AND SWALLOW OR SPIT 5 TO 15 ML PO Q 6 TO 8 H PRF MOUTH PAIN Disp:  Rfl: 2   VOLTAREN 1 % Transdermal Gel APPLY 4 GRAMS EXTERNALLY TO THE AFFECTED AREA FOUR TIMES DAILY Disp: 100 g Rfl: 0   FREESTYLE LITE TEST In Vitro Strip TEST SUGARS EVERY had numerous surgeries: OTHER      Comment: Great toe replacements removed and then                reconstructed   No date: OTHER      Comment: reconstructive surgery on left foot with                hardware then it was removed  No date: OTHER Right exercises for  45 Minutes    ROS:    Constitutional: positive for fatigue  Respiratory: positive for dyspnea on exertion  Cardiovascular: negative  Gastrointestinal: positive for constipation  Musculoskeletal:positive for arthralgias and back pain  Neurolo wearing their CPaP as recommended. Constipation: improving with meds. Goals for next month:  1. Keep a food log. 2. Drink 48-64 ounces of non-caloric beverages per day. No fruit juices or regular soda.   3. Increase activity-upper body exercises, w

## 2018-06-07 PROBLEM — R79.89 ELEVATED PTHRP LEVEL: Status: ACTIVE | Noted: 2018-06-07

## 2018-06-07 PROBLEM — D50.9 IRON DEFICIENCY ANEMIA, UNSPECIFIED IRON DEFICIENCY ANEMIA TYPE: Status: ACTIVE | Noted: 2018-06-07

## 2018-06-19 PROBLEM — M48.02 FORAMINAL STENOSIS OF CERVICAL REGION: Status: ACTIVE | Noted: 2018-06-19

## 2018-06-25 ENCOUNTER — HOSPITAL ENCOUNTER (OUTPATIENT)
Dept: CT IMAGING | Facility: HOSPITAL | Age: 58
Discharge: HOME | End: 2018-06-25
Attending: INTERNAL MEDICINE

## 2018-06-25 DIAGNOSIS — Z13.9 SCREENING PROCEDURE: ICD-10-CM

## 2018-08-01 ENCOUNTER — TELEPHONE (OUTPATIENT)
Dept: SURGERY | Facility: CLINIC | Age: 58
End: 2018-08-01

## 2018-08-01 NOTE — TELEPHONE ENCOUNTER
Patient takes a constipation medication but is now experiencing diarrhea for 4 or 5 days and then nothing for several days. Most recent episode left her without a bowel movement for 5 days.  She took a laxative last night and it started working this morning

## 2018-08-08 NOTE — PROGRESS NOTES
300 [facility]  300 [facility] BARIATRIC AND WEIGHT LOSS CLINIC  [address] [place] [address]  Dept: [phone]     Date:   2016    Patient:  Avtar Jeronimo  :      1960  MRN:      [id number]    Chief Complaint:  Patient presents with:   Luisito Serra - has issue with edema in lower extremities   • Neuropathy (HCC)      toes and left foot   • Seizure disorder (HCC)      olefactory seizures history not since age 32   • Osteoarthritis      generalized   • Back problem      SPASMS HERNIATED [address]  IN C SPIN (twelve) hours. Disp: 60 tablet Rfl: 0   Diclofenac Sodium (VOLTAREN) 1 % Transdermal Gel Apply 2 g topically 4 (four) times daily.  Disp: 2880 g Rfl: 0   VOLTAREN 1 % Transdermal Gel APPLY 4 GRAMS EXTERNALLY TO THE AFFECTED AREA FOUR TIMES DAILY Disp: 100 100 MG Oral Tab Take 1 tablet (100 mg total) by mouth nightly. Disp: 90 tablet Rfl: 1   Lidocaine HCl 3 % External Cream Apply 1 Application topically 4 (four) times daily as needed.  Disp:  Rfl:    Prochlorperazine Maleate (COMPAZINE) 10 mg tablet Take 10 3    OTHER  had numerous surgeries    Comment Great toe replacements removed and then reconstructed     OTHER      Comment reconstructive surgery on left foot with hardware then it was removed    OTHER Right     Comment tkr     Family History:    Family Hi negative  All other systems were reviewed and are negative    Physical Exam:   General appearance: alert, appears stated age and cooperative  Head: Normocephalic, without obvious abnormality, atraumatic  Lungs: clear to auscultation bilaterally  Heart: S1, exercises, walk 10 minutes per day. 4. Increase fruit and vegetable servings to 5-6 per day. Doing well    vyvanse  Tolerating well  Will refill at 50 mg  continue dexadrine 20-30 mg in the afternoon for fatigue. linzess working.     May benefit fr Pulses equal bilaterally, no edema present.

## 2018-08-13 ENCOUNTER — TELEPHONE (OUTPATIENT)
Dept: SURGERY | Facility: CLINIC | Age: 58
End: 2018-08-13

## 2018-08-13 DIAGNOSIS — B37.2 INTERTRIGINOUS CANDIDIASIS: ICD-10-CM

## 2018-08-13 RX ORDER — NYSTATIN 100000 [USP'U]/G
1 POWDER TOPICAL 4 TIMES DAILY
Qty: 30 G | Refills: 1 | Status: SHIPPED | OUTPATIENT
Start: 2018-08-13 | End: 2018-08-14

## 2018-08-13 NOTE — TELEPHONE ENCOUNTER
Patient needs a refill called in to [facility] [address] [facility] [place], [phone] for Linzess 145mcg & Nystop. Patient's phone # [phone].  Patient's next appt 9-4-18

## 2018-08-14 PROBLEM — L40.9 PSORIASIS: Status: ACTIVE | Noted: 2018-08-14

## 2018-09-04 ENCOUNTER — OFFICE VISIT (OUTPATIENT)
Dept: SURGERY | Facility: CLINIC | Age: 58
End: 2018-09-04
Payer: MEDICARE

## 2018-09-04 VITALS
SYSTOLIC BLOOD PRESSURE: 124 MMHG | BODY MASS INDEX: 40.94 KG/M2 | RESPIRATION RATE: 18 BRPM | HEIGHT: 61.5 IN | WEIGHT: 219.63 LBS | HEART RATE: 97 BPM | DIASTOLIC BLOOD PRESSURE: 85 MMHG

## 2018-09-04 DIAGNOSIS — K21.9 GASTROESOPHAGEAL REFLUX DISEASE WITHOUT ESOPHAGITIS: ICD-10-CM

## 2018-09-04 DIAGNOSIS — G47.33 OBSTRUCTIVE SLEEP APNEA SYNDROME: ICD-10-CM

## 2018-09-04 DIAGNOSIS — E78.2 MIXED HYPERLIPIDEMIA: ICD-10-CM

## 2018-09-04 DIAGNOSIS — R63.2 BINGE EATING: ICD-10-CM

## 2018-09-04 DIAGNOSIS — E55.9 VITAMIN D DEFICIENCY: ICD-10-CM

## 2018-09-04 DIAGNOSIS — I10 ESSENTIAL HYPERTENSION: Primary | ICD-10-CM

## 2018-09-04 DIAGNOSIS — E66.01 MORBID OBESITY WITH BMI OF 40.0-44.9, ADULT (HCC): ICD-10-CM

## 2018-09-04 PROCEDURE — 99214 OFFICE O/P EST MOD 30 MIN: CPT | Performed by: INTERNAL MEDICINE

## 2018-09-04 NOTE — PROGRESS NOTES
[address] BARIATRIC AND WEIGHT LOSS CLINIC  [address] [place] [address]  Dept: [phone]     Date:   2016    Patient:  Salo Modi  :      1960  MRN:      [id number]    Chief Complaint:  Patient presents with:   Wilian Iglesias Other and unspecified hyperlipidemia    • Pancreatic divisum 2/24/2015    MRCP 2/2015   • Pneumonia, organism unspecified(486)    • Pre-diabetes    • Seizure disorder (HCC)     olefactory seizures history not since age 32   • Spondylosis of cervical region MOUTH 2 TIMES DAILY) Disp: 90 capsule Rfl: 0   POLYETHYLENE GLYCOL 3350 Oral Powder MIX 17 GRAMS IN LIQUID AND DRINK DAILY Disp: 527 g Rfl: 0   Fluocinolone Acetonide Scalp (DERMA-SMOOTHE/FS SCALP) 0.01 % External Oil Apply to scalp and ears QHS to BID up MOUTH PAIN Disp:  Rfl: 2   FREESTYLE LITE TEST In Vitro Strip TEST SUGARS EVERY MORNING, ALTERNATING WITH 2 HOURS POST PRANDIAL DAILY Disp: 150 strip Rfl: 0   Potassium Chloride ER 10 MEQ Oral Cap CR TK 4 CS PO QAM AND 2 CS QPM Disp:  Rfl: 5   Cholecalcife rotator cuff  1/2017: OTHER SURGICAL HISTORY      Comment: L shoulder replacement  No date: TONSILLECTOMY  Family History:    Family History   Problem Relation Age of Onset   • Diabetes Father    • Cancer Father      prostate cancer   • Lung Disorder Consuelo cooperative  Head: Normocephalic, without obvious abnormality, atraumatic  Lungs: clear to auscultation bilaterally  Heart: S1, S2 normal, no murmur, click, rub or gallop, regular rate and rhythm  Abdomen: soft, non-tender; bowel sounds normal; no masses, Tolerating well  Will refill at 70 mg    Bryan hudson.     Has support from     Follow up with pcp    Kobe Avelar MD

## 2018-10-26 LAB
25(OH)D3+25(OH)D2 SERPL-MCNC: 27.14 NG/ML
ALBUMIN SERPL-MCNC: 4.2 G/DL
ALBUMIN/GLOB SERPL: NORMAL {RATIO}
ALP SERPL-CCNC: 118 U/L
ALT SERPL-CCNC: 38 U/L
ANION GAP SERPL CALC-SCNC: NORMAL MMOL/L
AST SERPL-CCNC: 32 U/L
BILIRUB SERPL-MCNC: 0.49 MG/DL
BUN SERPL-MCNC: 28 MG/DL
BUN/CREAT SERPL: NORMAL
CALCIUM SERPL-MCNC: 8.9 MG/DL
CHLORIDE SERPL-SCNC: 98 MMOL/L
CHOLEST SERPL-MCNC: 156 MG/DL
CHOLEST/HDLC SERPL: NORMAL {RATIO}
CO2 SERPL-SCNC: NORMAL MMOL/L
CREAT SERPL-MCNC: 1.38 MG/DL
EST. AVERAGE GLUCOSE BLD GHB EST-MCNC: NORMAL MG/DL
GLOBULIN SER-MCNC: NORMAL G/DL
GLUCOSE SERPL-MCNC: 105 MG/DL
HBA1C MFR BLD: 5.7 %
HBA1C MFR BLD: NORMAL % (ref 4.5–5.6)
HDLC SERPL-MCNC: 68 MG/DL
LDLC SERPL CALC-MCNC: 76 MG/DL
LENGTH OF FAST TIME PATIENT: NORMAL H
LENGTH OF FAST TIME PATIENT: NORMAL H
NONHDLC SERPL-MCNC: NORMAL MG/DL
POTASSIUM SERPL-SCNC: 3.6 MMOL/L
PROT SERPL-MCNC: 8.1 G/DL
SODIUM SERPL-SCNC: 138 MMOL/L
TRIGL SERPL-MCNC: 58 MG/DL
TSH SERPL-ACNC: 0.2 M[IU]/L
VLDLC SERPL CALC-MCNC: NORMAL MG/DL

## 2018-10-26 PROCEDURE — 86803 HEPATITIS C AB TEST: CPT | Performed by: INTERNAL MEDICINE

## 2018-10-30 ENCOUNTER — TELEPHONE (OUTPATIENT)
Dept: SURGERY | Facility: CLINIC | Age: 58
End: 2018-10-30

## 2018-10-30 RX ORDER — LISDEXAMFETAMINE DIMESYLATE CAPSULES 70 MG/1
70 CAPSULE ORAL DAILY
Qty: 30 CAPSULE | Refills: 0 | Status: SHIPPED | OUTPATIENT
Start: 2018-11-29 | End: 2018-12-29

## 2018-10-30 RX ORDER — LISDEXAMFETAMINE DIMESYLATE CAPSULES 70 MG/1
70 CAPSULE ORAL EVERY MORNING
Qty: 30 CAPSULE | Refills: 0 | Status: SHIPPED | OUTPATIENT
Start: 2018-12-29 | End: 2018-12-04

## 2018-10-30 NOTE — TELEPHONE ENCOUNTER
Patient had to cancel 10/30/18 appt due to being sick. She rescheduled about to 12/4/18. She does however need refill on Vyvanse and Linzess by Thursday as she will be leaving for Out of Town on Friday. Please call patient when refill is ready.

## 2018-11-21 ENCOUNTER — PRIOR ORIGINAL RECORDS (OUTPATIENT)
Dept: OTHER | Age: 58
End: 2018-11-21

## 2018-12-04 ENCOUNTER — OFFICE VISIT (OUTPATIENT)
Dept: SURGERY | Facility: CLINIC | Age: 58
End: 2018-12-04
Payer: MEDICARE

## 2018-12-04 VITALS
SYSTOLIC BLOOD PRESSURE: 103 MMHG | DIASTOLIC BLOOD PRESSURE: 69 MMHG | BODY MASS INDEX: 39.33 KG/M2 | HEART RATE: 90 BPM | WEIGHT: 211 LBS | RESPIRATION RATE: 18 BRPM | OXYGEN SATURATION: 99 % | HEIGHT: 61.5 IN

## 2018-12-04 DIAGNOSIS — E66.01 MORBID OBESITY WITH BMI OF 40.0-44.9, ADULT (HCC): ICD-10-CM

## 2018-12-04 DIAGNOSIS — I10 ESSENTIAL HYPERTENSION: ICD-10-CM

## 2018-12-04 DIAGNOSIS — G47.33 OBSTRUCTIVE SLEEP APNEA SYNDROME: ICD-10-CM

## 2018-12-04 DIAGNOSIS — E78.2 MIXED HYPERLIPIDEMIA: Primary | ICD-10-CM

## 2018-12-04 PROCEDURE — 99214 OFFICE O/P EST MOD 30 MIN: CPT | Performed by: INTERNAL MEDICINE

## 2018-12-04 RX ORDER — TORSEMIDE 10 MG/1
20 TABLET ORAL NIGHTLY
COMMUNITY
End: 2019-11-25

## 2018-12-04 NOTE — PROGRESS NOTES
[facility] BARIATRIC AND WEIGHT LOSS CLINIC  [address] [address]  Dept: [phone]     Date:   2016    Patient:  Duke Siegel  :      1960  MRN:      [id number]    Chief Complaint:  Patient presents with:   Haven Goldman Osteoarthritis     generalized   • Other and unspecified hyperlipidemia    • Pancreatic divisum 2/24/2015    MRCP 2/2015   • Pneumonia, organism unspecified(486)    • Pre-diabetes    • Seizure disorder ([facility] [address]     olefactory seizures history not since age 32 ONE TIME DAILY  Disp: 30 tablet Rfl: 5   CYCLOBENZAPRINE HCL 10 MG Oral Tab TAKE ONE TABLET BY MOUTH THREE TIMES DAILY  FOR MUSCLE SPASMS Disp: 60 tablet Rfl: 1   Fluocinolone Acetonide Scalp (DERMA-SMOOTHE/FS SCALP) 0.01 % External Oil Apply to scalp and Release 1 p.o. twice daily 1/2-hour prior to breakfast and dinner for 1 month then 1 p.o. every morning Disp: 180 capsule Rfl: 2   Prochlorperazine Maleate (COMPAZINE) 10 mg tablet TAKE 1 TABLET BY MOUTH EVERY 6 HOURS AS NEEDED Disp: 120 tablet Rfl: 0   Po HARDWARE REMOVAL Right 6/20/2017    Performed by Jason Enamorado MD at [facility] MAIN OR   • EXTREMITY LOWER HARDWARE REMOVAL Left 3/22/2016    Performed by Jason Enamorado MD at [facility] MAIN OR   • FOOT JOINT FUSION Bilateral 9/6/2013    Performed by Jonny Monson labels? yes  · Average Caloric Intake:     · Average CHO Intake: <100  · Is patient exercising?  yes  · Type of exercise? adl's    Eating Habits  · Patient states the following:  · Eats 3 meal(s) per day  · Length of time it takes to consume a meal:  20  · arms      ASSESSMENT     HYPERTENSION:  The patient's blood pressure has been well controlled. she has been checking it as instructed and has remained in relatively good control.     OBSTRUCTIVE SLEEP APNEA: The patient states her sleep apnea has been stab

## 2018-12-06 ENCOUNTER — PRIOR ORIGINAL RECORDS (OUTPATIENT)
Dept: OTHER | Age: 58
End: 2018-12-06

## 2018-12-06 ENCOUNTER — MYAURORA ACCOUNT LINK (OUTPATIENT)
Dept: OTHER | Age: 58
End: 2018-12-06

## 2018-12-14 ENCOUNTER — PRIOR ORIGINAL RECORDS (OUTPATIENT)
Dept: OTHER | Age: 58
End: 2018-12-14

## 2018-12-14 LAB
ABSOLUTE IMMATURE GRANULOCYTES (OFFPRE24): NORMAL
ALBUMIN SERPL-MCNC: 4.1 G/DL
ALBUMIN/GLOB SERPL: NORMAL {RATIO}
ALP SERPL-CCNC: 113 U/L
ALT SERPL-CCNC: 29 U/L
ANION GAP SERPL CALC-SCNC: NORMAL MMOL/L
AST SERPL-CCNC: 26 U/L
BASO+EOS+MONOS # BLD: NORMAL 10*3/UL
BASO+EOS+MONOS NFR BLD: NORMAL %
BASOPHILS # BLD: NORMAL 10*3/UL
BASOPHILS NFR BLD: NORMAL %
BILIRUB SERPL-MCNC: 0.4 MG/DL
BUN SERPL-MCNC: 19 MG/DL
BUN/CREAT SERPL: NORMAL
CALCIUM SERPL-MCNC: 9.5 MG/DL
CHLORIDE SERPL-SCNC: 102 MMOL/L
CO2 SERPL-SCNC: NORMAL MMOL/L
CREAT SERPL-MCNC: 1.1 MG/DL
DIFFERENTIAL METHOD BLD: NORMAL
EOSINOPHIL # BLD: NORMAL 10*3/UL
EOSINOPHIL NFR BLD: NORMAL %
ERYTHROCYTE [DISTWIDTH] IN BLOOD: NORMAL %
GLOBULIN SER-MCNC: NORMAL G/DL
GLUCOSE SERPL-MCNC: 101 MG/DL
HCT VFR BLD CALC: 42.3 %
HGB BLD-MCNC: 14 G/DL
IMMATURE GRANULOCYTES (OFFPRE25): NORMAL
LENGTH OF FAST TIME PATIENT: NORMAL H
LYMPHOCYTES # BLD: NORMAL 10*3/UL
LYMPHOCYTES NFR BLD: NORMAL %
MCH RBC QN AUTO: NORMAL PG
MCHC RBC AUTO-ENTMCNC: NORMAL G/DL
MCV RBC AUTO: NORMAL FL
MONOCYTES # BLD: NORMAL 10*3/UL
MONOCYTES NFR BLD: NORMAL %
MPV (OFFPRE2): NORMAL
NEUTROPHILS # BLD: NORMAL 10*3/UL
NEUTROPHILS NFR BLD: NORMAL %
NRBC BLD MANUAL-RTO: NORMAL %
PLAT MORPH BLD: NORMAL
PLATELET # BLD: 190 10*3/UL
POTASSIUM SERPL-SCNC: 4.1 MMOL/L
PROT SERPL-MCNC: 7.6 G/DL
RBC # BLD: 4.51 10*6/UL
RBC MORPH BLD: NORMAL
SODIUM SERPL-SCNC: 144 MMOL/L
WBC # BLD: 6.16 10*3/UL
WBC MORPH BLD: NORMAL

## 2018-12-18 ENCOUNTER — ANESTHESIA (OUTPATIENT)
Dept: SURGERY | Facility: HOSPITAL | Age: 58
End: 2018-12-18

## 2018-12-18 ENCOUNTER — APPOINTMENT (OUTPATIENT)
Dept: GENERAL RADIOLOGY | Facility: HOSPITAL | Age: 58
End: 2018-12-18
Attending: ORTHOPAEDIC SURGERY
Payer: MEDICARE

## 2018-12-18 ENCOUNTER — ANESTHESIA EVENT (OUTPATIENT)
Dept: SURGERY | Facility: HOSPITAL | Age: 58
End: 2018-12-18

## 2018-12-18 ENCOUNTER — HOSPITAL ENCOUNTER (OUTPATIENT)
Facility: HOSPITAL | Age: 58
Setting detail: HOSPITAL OUTPATIENT SURGERY
Discharge: HOME | End: 2018-12-18
Attending: ORTHOPAEDIC SURGERY | Admitting: ORTHOPAEDIC SURGERY
Payer: MEDICARE

## 2018-12-18 VITALS
DIASTOLIC BLOOD PRESSURE: 64 MMHG | HEART RATE: 70 BPM | TEMPERATURE: 98 F | BODY MASS INDEX: 37.96 KG/M2 | HEIGHT: 61.5 IN | WEIGHT: 203.69 LBS | RESPIRATION RATE: 20 BRPM | OXYGEN SATURATION: 96 % | SYSTOLIC BLOOD PRESSURE: 109 MMHG

## 2018-12-18 DIAGNOSIS — M79.671 RIGHT FOOT PAIN: ICD-10-CM

## 2018-12-18 PROCEDURE — 0QBP0ZZ EXCISION OF LEFT METATARSAL, OPEN APPROACH: ICD-10-PCS | Performed by: ORTHOPAEDIC SURGERY

## 2018-12-18 PROCEDURE — 76000 FLUOROSCOPY <1 HR PHYS/QHP: CPT | Performed by: ORTHOPAEDIC SURGERY

## 2018-12-18 PROCEDURE — 0SGN07Z FUSION OF LEFT METATARSAL-PHALANGEAL JOINT WITH AUTOLOGOUS TISSUE SUBSTITUTE, OPEN APPROACH: ICD-10-PCS | Performed by: ORTHOPAEDIC SURGERY

## 2018-12-18 PROCEDURE — 0QSP04Z REPOSITION LEFT METATARSAL WITH INTERNAL FIXATION DEVICE, OPEN APPROACH: ICD-10-PCS | Performed by: ORTHOPAEDIC SURGERY

## 2018-12-18 DEVICE — GRAFT BN ALLO 3D BN MTRX: Type: IMPLANTABLE DEVICE | Site: FOOT | Status: FUNCTIONAL

## 2018-12-18 RX ORDER — ACETAMINOPHEN 500 MG
1000 TABLET ORAL EVERY 6 HOURS PRN
Status: ON HOLD | COMMUNITY
End: 2018-12-18

## 2018-12-18 RX ORDER — SODIUM CHLORIDE, SODIUM LACTATE, POTASSIUM CHLORIDE, CALCIUM CHLORIDE 600; 310; 30; 20 MG/100ML; MG/100ML; MG/100ML; MG/100ML
INJECTION, SOLUTION INTRAVENOUS CONTINUOUS
Status: DISCONTINUED | OUTPATIENT
Start: 2018-12-18 | End: 2018-12-18

## 2018-12-18 RX ORDER — OXYCODONE HYDROCHLORIDE 5 MG/1
5 TABLET ORAL
Status: COMPLETED | OUTPATIENT
Start: 2018-12-18 | End: 2018-12-18

## 2018-12-18 RX ORDER — CEFAZOLIN SODIUM/WATER 2 G/20 ML
2 SYRINGE (ML) INTRAVENOUS ONCE
Status: COMPLETED | OUTPATIENT
Start: 2018-12-18 | End: 2018-12-18

## 2018-12-18 RX ORDER — NALOXONE HYDROCHLORIDE 0.4 MG/ML
80 INJECTION, SOLUTION INTRAMUSCULAR; INTRAVENOUS; SUBCUTANEOUS AS NEEDED
Status: DISCONTINUED | OUTPATIENT
Start: 2018-12-18 | End: 2018-12-18

## 2018-12-18 RX ORDER — HYDROMORPHONE HYDROCHLORIDE 1 MG/ML
0.4 INJECTION, SOLUTION INTRAMUSCULAR; INTRAVENOUS; SUBCUTANEOUS EVERY 5 MIN PRN
Status: DISCONTINUED | OUTPATIENT
Start: 2018-12-18 | End: 2018-12-18

## 2018-12-18 RX ORDER — ACETAMINOPHEN 500 MG
1000 TABLET ORAL ONCE
Status: DISCONTINUED | OUTPATIENT
Start: 2018-12-18 | End: 2018-12-18 | Stop reason: HOSPADM

## 2018-12-18 RX ORDER — OXYCODONE HYDROCHLORIDE 5 MG/1
TABLET ORAL
Status: COMPLETED
Start: 2018-12-18 | End: 2018-12-18

## 2018-12-18 RX ORDER — BUPIVACAINE HYDROCHLORIDE 5 MG/ML
INJECTION, SOLUTION EPIDURAL; INTRACAUDAL AS NEEDED
Status: DISCONTINUED | OUTPATIENT
Start: 2018-12-18 | End: 2018-12-18

## 2018-12-18 RX ORDER — HYDROMORPHONE HYDROCHLORIDE 1 MG/ML
INJECTION, SOLUTION INTRAMUSCULAR; INTRAVENOUS; SUBCUTANEOUS
Status: COMPLETED
Start: 2018-12-18 | End: 2018-12-18

## 2018-12-18 NOTE — ANESTHESIA POSTPROCEDURE EVALUATION
[address] [place] [address] Patient Status:  Hospital Outpatient Surgery   Age/Gender 62year old female MRN [id number]   [facility] SURGERY Attending Genie Jerome MD   Hosp Day # 0 PCP Nataly Carr MD       Anesthesia Post-op

## 2018-12-18 NOTE — H&P
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    Current Outpatient Medications on File Prior to Encounter:  COLCHICINE 0.6 MG Oral Tab TAKE ONE TABLET BY MOUTH TWICE DAILY Disp: 180 tablet Rfl: 0   morphINE Sulfate ER (MS CONTIN) 30 MG Oral Oral Tab EC Take 325 mg by mouth daily. Disp:  Rfl:    Potassium Chloride ER 10 MEQ Oral Cap CR TK 4 CS PO QAM AND 2 CS QPM Disp:  Rfl: 5   Cholecalciferol (VITAMIN D) 1000 UNITS Oral Tab Take 1 capsule by mouth nightly.    Disp:  Rfl:    Biotin 5000 MCG Or ALLHICKOANDERS, ALLMARSHELD, ALLMOUSEEPI, ALLMUCORRACE, ALLMULBERRY, ALLOAKTREE, ALLPENICNOTA  No results found for: Waldemar Lainez, OLGA, DENIS, ALLCASIMIRO, ALLJOHN, ALLBERTO, IGE  Social History    Socioeconomic History      Ma removal of hardware and revision MTP fusion and 5th met spur resection  We discussed continued conservative, non-surgical management for this problem which I always think is reasonable in elective circumstances.   However, given that this has been reasonabl

## 2018-12-18 NOTE — ANESTHESIA PREPROCEDURE EVALUATION
PRE-OP EVALUATION    Patient Name: Rina Jamil    Pre-op Diagnosis: Right foot pain [M79.671]    Procedure(s):  FOOT HARDWARE REMOVAL, FUSION REVISION METATARSOPHALANGEAL JOINT,   SPUR RESECTION FIFTH METATARSAL    Surgeon(s) and Role:     * Lisa Bass every morning (Patient taking differently: Take 40 mg by mouth every evening.  1 p.o. twice daily 1/2-hour prior to breakfast and dinner for 1 month then 1 p.o. every morning ) Disp: 180 capsule Rfl: 2   Prochlorperazine Maleate (COMPAZINE) 10 mg tablet ELIECER Pulmonary                    (+) sleep apnea and BiPAP      Neuro/Psych                              Fibromyalgia  Bechets dx      Past Surgical History:   Procedure Laterality Date   • APPENDECTOMY     • ARTHROSCOPY OF JOINT UNLISTED Bilateral     right Ana María Salas MD at [facility] MAIN OR   • TONSILLECTOMY       Social History    Tobacco Use      Smoking status: Former Smoker        Packs/day: 0.50        Years: 5.00        Pack years: 2.5        Quit date: 1991        Years since quittin.9      S

## 2018-12-19 NOTE — OPERATIVE REPORT
[facility]    PATIENT'S NAME: Dick Samanocarleen   ATTENDING PHYSICIAN: Amarilis Morton M.D. OPERATING PHYSICIAN: Amarilis Morton M.D.    PATIENT ACCOUNT#:   [de-identified]    LOCATION:  [address]  MEDICAL RECORD #:   [id number]       DATE seen with a K-wire. After that, we also placed a staple medially. Good fixation was seen. We irrigated the wound, placed bone graft over it again, and then closed with 2-0 Vicryl and nylon.   The patient placed into a soft sterile dressing and moved off

## 2019-01-17 ENCOUNTER — HOSPITAL ENCOUNTER (OUTPATIENT)
Facility: HOSPITAL | Age: 59
Setting detail: HOSPITAL OUTPATIENT SURGERY
Discharge: HOME | End: 2019-01-17
Attending: ORTHOPAEDIC SURGERY | Admitting: ORTHOPAEDIC SURGERY
Payer: MEDICARE

## 2019-01-17 ENCOUNTER — ANESTHESIA (OUTPATIENT)
Dept: SURGERY | Facility: HOSPITAL | Age: 59
End: 2019-01-17
Payer: MEDICARE

## 2019-01-17 ENCOUNTER — ANESTHESIA EVENT (OUTPATIENT)
Dept: SURGERY | Facility: HOSPITAL | Age: 59
End: 2019-01-17
Payer: MEDICARE

## 2019-01-17 VITALS
OXYGEN SATURATION: 94 % | HEIGHT: 61.5 IN | WEIGHT: 212.5 LBS | BODY MASS INDEX: 39.61 KG/M2 | TEMPERATURE: 98 F | RESPIRATION RATE: 18 BRPM | HEART RATE: 72 BPM | SYSTOLIC BLOOD PRESSURE: 105 MMHG | DIASTOLIC BLOOD PRESSURE: 56 MMHG

## 2019-01-17 DIAGNOSIS — M79.671 RIGHT FOOT PAIN: ICD-10-CM

## 2019-01-17 DIAGNOSIS — Z96.9 RETAINED ORTHOPEDIC HARDWARE: ICD-10-CM

## 2019-01-17 LAB
ANION GAP SERPL CALC-SCNC: NORMAL MMOL/L
BUN SERPL-MCNC: NORMAL MG/DL
BUN/CREAT SERPL: NORMAL
CALCIUM SERPL-MCNC: NORMAL MG/DL
CHLORIDE SERPL-SCNC: 104 MMOL/L
CHLORIDE SERPL-SCNC: 104 MMOL/L (ref 101–111)
CO2 SERPL-SCNC: 34 MMOL/L (ref 22–32)
CO2 SERPL-SCNC: NORMAL MMOL/L
CREAT SERPL-MCNC: NORMAL MG/DL
GLUCOSE SERPL-MCNC: NORMAL MG/DL
LENGTH OF FAST TIME PATIENT: NORMAL H
POTASSIUM SERPL-SCNC: 4.1 MMOL/L
POTASSIUM SERPL-SCNC: 4.1 MMOL/L (ref 3.6–5.1)
SODIUM SERPL-SCNC: 142 MMOL/L
SODIUM SERPL-SCNC: 142 MMOL/L (ref 136–144)

## 2019-01-17 PROCEDURE — 80051 ELECTROLYTE PANEL: CPT

## 2019-01-17 PROCEDURE — 0SPP04Z REMOVAL OF INTERNAL FIXATION DEVICE FROM RIGHT TOE PHALANGEAL JOINT, OPEN APPROACH: ICD-10-PCS | Performed by: ORTHOPAEDIC SURGERY

## 2019-01-17 DEVICE — WIRE K SMALL .062: Type: IMPLANTABLE DEVICE | Site: TOE | Status: FUNCTIONAL

## 2019-01-17 RX ORDER — HYDROCODONE BITARTRATE AND ACETAMINOPHEN 10; 325 MG/1; MG/1
1-2 TABLET ORAL
Qty: 60 TABLET | Refills: 0 | Status: SHIPPED | OUTPATIENT
Start: 2019-01-17 | End: 2019-01-17

## 2019-01-17 RX ORDER — ONDANSETRON 2 MG/ML
4 INJECTION INTRAMUSCULAR; INTRAVENOUS AS NEEDED
Status: DISCONTINUED | OUTPATIENT
Start: 2019-01-17 | End: 2019-01-17

## 2019-01-17 RX ORDER — BUPIVACAINE HYDROCHLORIDE 5 MG/ML
INJECTION, SOLUTION EPIDURAL; INTRACAUDAL AS NEEDED
Status: DISCONTINUED | OUTPATIENT
Start: 2019-01-17 | End: 2019-01-17

## 2019-01-17 RX ORDER — CEFAZOLIN SODIUM/WATER 2 G/20 ML
2 SYRINGE (ML) INTRAVENOUS ONCE
Status: COMPLETED | OUTPATIENT
Start: 2019-01-17 | End: 2019-01-17

## 2019-01-17 RX ORDER — OXYCODONE AND ACETAMINOPHEN 10; 325 MG/1; MG/1
2 TABLET ORAL EVERY 4 HOURS PRN
Status: DISCONTINUED | OUTPATIENT
Start: 2019-01-17 | End: 2019-01-17

## 2019-01-17 RX ORDER — OXYCODONE AND ACETAMINOPHEN 10; 325 MG/1; MG/1
1 TABLET ORAL EVERY 4 HOURS PRN
Status: DISCONTINUED | OUTPATIENT
Start: 2019-01-17 | End: 2019-01-17

## 2019-01-17 RX ORDER — DEXAMETHASONE SODIUM PHOSPHATE 4 MG/ML
4 VIAL (ML) INJECTION AS NEEDED
Status: DISCONTINUED | OUTPATIENT
Start: 2019-01-17 | End: 2019-01-17

## 2019-01-17 RX ORDER — IBUPROFEN 600 MG/1
TABLET ORAL
Status: COMPLETED
Start: 2019-01-17 | End: 2019-01-17

## 2019-01-17 RX ORDER — HYDROCODONE BITARTRATE AND ACETAMINOPHEN 5; 325 MG/1; MG/1
2 TABLET ORAL AS NEEDED
Status: DISCONTINUED | OUTPATIENT
Start: 2019-01-17 | End: 2019-01-17

## 2019-01-17 RX ORDER — ACETAMINOPHEN 500 MG
1000 TABLET ORAL EVERY 6 HOURS PRN
Status: ON HOLD | COMMUNITY
End: 2019-01-17

## 2019-01-17 RX ORDER — MIDAZOLAM HYDROCHLORIDE 1 MG/ML
1 INJECTION INTRAMUSCULAR; INTRAVENOUS EVERY 5 MIN PRN
Status: DISCONTINUED | OUTPATIENT
Start: 2019-01-17 | End: 2019-01-17

## 2019-01-17 RX ORDER — NALOXONE HYDROCHLORIDE 0.4 MG/ML
80 INJECTION, SOLUTION INTRAMUSCULAR; INTRAVENOUS; SUBCUTANEOUS AS NEEDED
Status: DISCONTINUED | OUTPATIENT
Start: 2019-01-17 | End: 2019-01-17

## 2019-01-17 RX ORDER — HYDROMORPHONE HYDROCHLORIDE 1 MG/ML
0.4 INJECTION, SOLUTION INTRAMUSCULAR; INTRAVENOUS; SUBCUTANEOUS EVERY 5 MIN PRN
Status: DISCONTINUED | OUTPATIENT
Start: 2019-01-17 | End: 2019-01-17

## 2019-01-17 RX ORDER — HYDROCODONE BITARTRATE AND ACETAMINOPHEN 5; 325 MG/1; MG/1
1 TABLET ORAL AS NEEDED
Status: DISCONTINUED | OUTPATIENT
Start: 2019-01-17 | End: 2019-01-17

## 2019-01-17 RX ORDER — OXYCODONE AND ACETAMINOPHEN 10; 325 MG/1; MG/1
2 TABLET ORAL EVERY 4 HOURS PRN
Qty: 50 TABLET | Refills: 0 | Status: SHIPPED | OUTPATIENT
Start: 2019-01-17 | End: 2019-01-27

## 2019-01-17 RX ORDER — LABETALOL HYDROCHLORIDE 5 MG/ML
5 INJECTION, SOLUTION INTRAVENOUS EVERY 5 MIN PRN
Status: DISCONTINUED | OUTPATIENT
Start: 2019-01-17 | End: 2019-01-17

## 2019-01-17 RX ORDER — ACETAMINOPHEN 500 MG
1000 TABLET ORAL ONCE
Status: DISCONTINUED | OUTPATIENT
Start: 2019-01-17 | End: 2019-01-17 | Stop reason: HOSPADM

## 2019-01-17 RX ORDER — IBUPROFEN 200 MG
600 TABLET ORAL ONCE AS NEEDED
Status: COMPLETED | OUTPATIENT
Start: 2019-01-17 | End: 2019-01-17

## 2019-01-17 RX ORDER — METOCLOPRAMIDE HYDROCHLORIDE 5 MG/ML
10 INJECTION INTRAMUSCULAR; INTRAVENOUS AS NEEDED
Status: DISCONTINUED | OUTPATIENT
Start: 2019-01-17 | End: 2019-01-17

## 2019-01-17 RX ORDER — SODIUM CHLORIDE, SODIUM LACTATE, POTASSIUM CHLORIDE, CALCIUM CHLORIDE 600; 310; 30; 20 MG/100ML; MG/100ML; MG/100ML; MG/100ML
INJECTION, SOLUTION INTRAVENOUS CONTINUOUS
Status: DISCONTINUED | OUTPATIENT
Start: 2019-01-17 | End: 2019-01-17

## 2019-01-17 NOTE — ANESTHESIA PREPROCEDURE EVALUATION
PRE-OP EVALUATION    Patient Name: Praveen Rivera    Pre-op Diagnosis: Right foot pain [M79.671]  Retained orthopedic hardware [Z96.9]    Procedure(s):  REMOVAL OF HARDWARE AND LENGTHENING OF FIRST METATARSAL WITH PINNING OF   INTERPHALANGEAL JOINT RIGHT GR (120 mg total) by mouth daily. Disp: 180 capsule Rfl: 1   lamoTRIgine (LAMICTAL) 100 MG Oral Tab Take 1 tablet (100 mg total) by mouth nightly.  Disp: 90 tablet Rfl: 1   Diclofenac Sodium (VOLTAREN) 1 % Transdermal Gel Apply 4 g topically 4 (four) times gaby ARTHROSCOPY Right 6/23/2015    Performed by Greg Paz MD at [address] [place] [address] Dr   • KNEE ARTHROSCOPY Left 5/19/2015    Performed by Greg Paz MD at [address] [place] [address] Dr   • [address] [place] [address] Left 12/6/2016    Performed by Greg Paz MD at [facility] MAIN 12/14/2018    K 4.10 12/14/2018     12/14/2018    CO2 28.3 12/14/2018    BUN 19 12/14/2018    CREATSERUM 1.10 (H) 12/14/2018     (H) 12/14/2018    CA 9.5 12/14/2018            Airway      Mallampati: I       Cardiovascular    Cardiovascular ex

## 2019-01-17 NOTE — BRIEF OP NOTE
Pre-Operative Diagnosis: Right foot pain [M79.671]  Retained orthopedic hardware [Z96.9] shortened first metatarsal  IP joint nonunion     Post-Operative Diagnosis: *same  Procedure Performed:   Procedure(s):  REMOVAL OF HARDWARE AND LENGTHENING OF FIRST M

## 2019-01-17 NOTE — ANESTHESIA POSTPROCEDURE EVALUATION
[facility]    Norm Chapa Patient Status:  Hospital Outpatient Surgery   Age/Gender 61year old female MRN [id number]   Location [address] [facility] Attending Stefany Larsen MD   Hosp Day # 0 PCP Ever Young MD

## 2019-01-17 NOTE — H&P
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    Current Outpatient Medications on File Prior to Encounter:  ALPRAZolam 1 MG Oral Tab TAKE 1 TABLET EVERY 4 TO 6 HOURS AS NEEDED FOR ANXIETY AND SLEEP Disp: 90 tablet Rfl: 1   COLCHICINE 0.6 MG QPM Disp:  Rfl: 5   Cholecalciferol (VITAMIN D) 1000 UNITS Oral Tab Take 1 capsule by mouth nightly. Disp:  Rfl:    Biotin 5000 MCG Oral Cap Take 1 capsule by mouth 2 (two) times daily.    Disp:  Rfl:    torsemide 20 MG Oral Tab Take 40 mg by mouth every PRF MOUTH PAIN Disp:  Rfl: 2   FREESTYLE LITE TEST In Vitro Strip TEST SUGARS EVERY MORNING, ALTERNATING WITH 2 HOURS POST PRANDIAL DAILY Disp: 150 strip Rfl: 0     No results found for: AFIA JAIN, ALBERT, OCTAVIO, SALOME, A rhonchi. Cardiovascular: S1, S2.  Regular rate and rhythm. No murmurs. Equal pulses   Abdomen: Soft, nontender, nondistended. Positive bowel sounds. No rebound tenderness  Neurologic: No focal neurological deficits.   Musculoskeletal: Full range of motio

## 2019-01-18 NOTE — OPERATIVE REPORT
[facility]    PATIENT'S NAME: Brennan Cole   ATTENDING PHYSICIAN: Arnoldo Friedman M.D. OPERATING PHYSICIAN: Arnoldo Friedman M.D.    PATIENT ACCOUNT#:   [de-identified]    LOCATION:  [address] [place] [address] [address]  MEDICAL RECORD #:   [id number]       DATE

## 2019-02-28 VITALS
HEART RATE: 80 BPM | SYSTOLIC BLOOD PRESSURE: 108 MMHG | RESPIRATION RATE: 16 BRPM | WEIGHT: 237 LBS | DIASTOLIC BLOOD PRESSURE: 64 MMHG | HEIGHT: 62 IN | BODY MASS INDEX: 43.61 KG/M2

## 2019-03-14 ENCOUNTER — CLINICAL ABSTRACT (OUTPATIENT)
Dept: CARDIOLOGY | Age: 59
End: 2019-03-14

## 2019-03-24 RX ORDER — DULOXETIN HYDROCHLORIDE 30 MG/1
30 CAPSULE, DELAYED RELEASE ORAL DAILY
COMMUNITY
Start: 2014-12-08

## 2019-03-24 RX ORDER — LAMOTRIGINE 100 MG/1
1 TABLET ORAL DAILY
COMMUNITY
Start: 2013-10-12 | End: 2022-09-27

## 2019-03-24 RX ORDER — CYCLOBENZAPRINE HCL 10 MG
10 TABLET ORAL 3 TIMES DAILY PRN
COMMUNITY
Start: 2018-06-12

## 2019-03-24 RX ORDER — ALPRAZOLAM 1 MG/1
1 TABLET ORAL 2 TIMES DAILY PRN
COMMUNITY
Start: 2013-10-12

## 2019-03-24 RX ORDER — COLCHICINE 0.6 MG/1
1 TABLET ORAL 2 TIMES DAILY
COMMUNITY
Start: 2018-06-12 | End: 2021-07-29

## 2019-03-24 RX ORDER — GABAPENTIN 300 MG/1
CAPSULE ORAL
COMMUNITY
Start: 2018-06-12 | End: 2019-04-24 | Stop reason: DRUGHIGH

## 2019-03-24 RX ORDER — NICOTINE POLACRILEX 2 MG
GUM BUCCAL
COMMUNITY
Start: 2014-12-08 | End: 2022-09-27

## 2019-03-24 RX ORDER — MORPHINE SULFATE 30 MG/1
TABLET, FILM COATED, EXTENDED RELEASE ORAL
COMMUNITY
End: 2022-09-27

## 2019-03-24 RX ORDER — ASPIRIN 325 MG
TABLET ORAL
COMMUNITY
Start: 2013-10-12 | End: 2019-11-04

## 2019-03-25 RX ORDER — OXYCODONE AND ACETAMINOPHEN 10; 325 MG/1; MG/1
1 TABLET ORAL EVERY 8 HOURS PRN
COMMUNITY
Start: 2015-03-02

## 2019-03-25 RX ORDER — ATORVASTATIN CALCIUM 20 MG/1
TABLET, FILM COATED ORAL
COMMUNITY
Start: 2018-09-07 | End: 2019-07-24 | Stop reason: SDUPTHER

## 2019-03-25 RX ORDER — FAMOTIDINE 20 MG
1 TABLET ORAL DAILY
COMMUNITY
Start: 2018-06-12 | End: 2022-09-27

## 2019-03-25 RX ORDER — OMEPRAZOLE 40 MG/1
40 CAPSULE, DELAYED RELEASE ORAL DAILY
COMMUNITY
Start: 2018-06-12

## 2019-03-25 RX ORDER — POTASSIUM CHLORIDE 750 MG/1
CAPSULE, EXTENDED RELEASE ORAL
COMMUNITY
Start: 2019-08-01 | End: 2019-08-05 | Stop reason: SDUPTHER

## 2019-03-25 RX ORDER — PROCHLORPERAZINE MALEATE 10 MG
10 TABLET ORAL EVERY 6 HOURS PRN
COMMUNITY
Start: 2014-12-08

## 2019-03-25 RX ORDER — VALACYCLOVIR HYDROCHLORIDE 500 MG/1
500 TABLET, FILM COATED ORAL DAILY
COMMUNITY
Start: 2013-10-12

## 2019-03-25 RX ORDER — TORSEMIDE 20 MG/1
TABLET ORAL
COMMUNITY
Start: 2018-07-30 | End: 2019-04-21 | Stop reason: SDUPTHER

## 2019-04-19 PROBLEM — E66.01 MORBID OBESITY WITH BMI OF 40.0-44.9, ADULT (HCC): Status: RESOLVED | Noted: 2017-05-09 | Resolved: 2019-04-19

## 2019-04-19 RX ORDER — LIDOCAINE HCL 3 %
3 LOTION (ML) TOPICAL 2 TIMES DAILY
COMMUNITY
Start: 2013-12-10 | End: 2021-07-29

## 2019-04-19 RX ORDER — NYSTATIN 100000 [USP'U]/G
100000 POWDER TOPICAL DAILY
COMMUNITY
Start: 2018-12-06 | End: 2019-04-24 | Stop reason: ALTCHOICE

## 2019-04-19 RX ORDER — BUTALBITAL, ACETAMINOPHEN AND CAFFEINE 300; 40; 50 MG/1; MG/1; MG/1
50 CAPSULE ORAL 4 TIMES DAILY PRN
Status: ON HOLD | COMMUNITY
Start: 2013-12-10 | End: 2023-05-20

## 2019-04-21 ASSESSMENT — ENCOUNTER SYMPTOMS
COUGH: 0
WEIGHT GAIN: 0
FEVER: 0
HEMOPTYSIS: 0
SUSPICIOUS LESIONS: 0
HEMATOCHEZIA: 0
ALLERGIC/IMMUNOLOGIC COMMENTS: NO NEW FOOD ALLERGIES
CHILLS: 0
WEIGHT LOSS: 0
BRUISES/BLEEDS EASILY: 0

## 2019-04-22 ENCOUNTER — APPOINTMENT (OUTPATIENT)
Dept: CARDIOLOGY | Age: 59
End: 2019-04-22

## 2019-04-22 RX ORDER — TORSEMIDE 20 MG/1
TABLET ORAL
Qty: 270 TABLET | Refills: 1 | Status: SHIPPED | OUTPATIENT
Start: 2019-04-22 | End: 2019-10-16 | Stop reason: SDUPTHER

## 2019-04-22 RX ORDER — LISDEXAMFETAMINE DIMESYLATE 70 MG/1
70 CAPSULE ORAL DAILY
Refills: 0 | COMMUNITY
Start: 2019-03-17 | End: 2022-09-27

## 2019-04-24 ENCOUNTER — OFFICE VISIT (OUTPATIENT)
Dept: CARDIOLOGY | Age: 59
End: 2019-04-24

## 2019-04-24 VITALS
WEIGHT: 205 LBS | DIASTOLIC BLOOD PRESSURE: 68 MMHG | HEART RATE: 62 BPM | RESPIRATION RATE: 16 BRPM | BODY MASS INDEX: 37.73 KG/M2 | SYSTOLIC BLOOD PRESSURE: 110 MMHG | HEIGHT: 62 IN

## 2019-04-24 DIAGNOSIS — R60.0 LOCALIZED EDEMA: ICD-10-CM

## 2019-04-24 DIAGNOSIS — E78.00 PURE HYPERCHOLESTEROLEMIA: ICD-10-CM

## 2019-04-24 DIAGNOSIS — I50.32 CHRONIC DIASTOLIC HEART FAILURE (CMD): ICD-10-CM

## 2019-04-24 DIAGNOSIS — G47.33 OBSTRUCTIVE SLEEP APNEA SYNDROME: Primary | ICD-10-CM

## 2019-04-24 PROCEDURE — 99212 OFFICE O/P EST SF 10 MIN: CPT | Performed by: INTERNAL MEDICINE

## 2019-04-24 RX ORDER — GABAPENTIN 300 MG/1
300 CAPSULE ORAL 2 TIMES DAILY
COMMUNITY

## 2019-04-24 ASSESSMENT — ENCOUNTER SYMPTOMS
FEVER: 0
WEIGHT GAIN: 0
ALLERGIC/IMMUNOLOGIC COMMENTS: NO NEW FOOD ALLERGIES
HEMOPTYSIS: 0
CHILLS: 0
BRUISES/BLEEDS EASILY: 0
WEIGHT LOSS: 0
COUGH: 0
HEMATOCHEZIA: 0
SUSPICIOUS LESIONS: 0

## 2019-04-25 ENCOUNTER — TELEPHONE (OUTPATIENT)
Dept: SURGERY | Facility: CLINIC | Age: 59
End: 2019-04-25

## 2019-04-25 NOTE — TELEPHONE ENCOUNTER
Spoke to [employer] to being depressed. I advised her that she should talk to a therapist.  She agreed    Justo Brooks has been consulted.

## 2019-04-26 PROBLEM — M85.80 OSTEOPENIA, UNSPECIFIED LOCATION: Status: ACTIVE | Noted: 2019-04-26

## 2019-04-26 PROCEDURE — 84080 ASSAY ALKALINE PHOSPHATASES: CPT | Performed by: INTERNAL MEDICINE

## 2019-04-26 PROCEDURE — 84075 ASSAY ALKALINE PHOSPHATASE: CPT | Performed by: INTERNAL MEDICINE

## 2019-05-15 ENCOUNTER — TELEPHONE (OUTPATIENT)
Dept: SURGERY | Facility: CLINIC | Age: 59
End: 2019-05-15

## 2019-05-15 NOTE — TELEPHONE ENCOUNTER
PATIENT IS REQUESTING ANOTHER REFILL FOR VYVANSE. SHE IS UNABLE TO COME IN FOR AN APPOINTMENT DUE TO HAVING RECENT SURGERY.

## 2019-05-17 ENCOUNTER — TELEPHONE (OUTPATIENT)
Dept: SURGERY | Facility: CLINIC | Age: 59
End: 2019-05-17

## 2019-06-17 ENCOUNTER — TELEPHONE (OUTPATIENT)
Dept: CARDIOLOGY | Age: 59
End: 2019-06-17

## 2019-06-17 ENCOUNTER — TELEPHONE (OUTPATIENT)
Dept: SURGERY | Facility: CLINIC | Age: 59
End: 2019-06-17

## 2019-06-18 PROCEDURE — 82330 ASSAY OF CALCIUM: CPT | Performed by: INTERNAL MEDICINE

## 2019-07-08 ENCOUNTER — OFFICE VISIT (OUTPATIENT)
Dept: SURGERY | Facility: CLINIC | Age: 59
End: 2019-07-08
Payer: MEDICARE

## 2019-07-08 VITALS
DIASTOLIC BLOOD PRESSURE: 78 MMHG | WEIGHT: 192 LBS | BODY MASS INDEX: 35.79 KG/M2 | SYSTOLIC BLOOD PRESSURE: 130 MMHG | HEIGHT: 61.38 IN

## 2019-07-08 DIAGNOSIS — Z51.81 ENCOUNTER FOR THERAPEUTIC DRUG MONITORING: ICD-10-CM

## 2019-07-08 DIAGNOSIS — E78.2 MIXED HYPERLIPIDEMIA: ICD-10-CM

## 2019-07-08 DIAGNOSIS — E66.9 OBESITY (BMI 30-39.9): ICD-10-CM

## 2019-07-08 DIAGNOSIS — R63.2 BINGE EATING: ICD-10-CM

## 2019-07-08 DIAGNOSIS — G47.33 OBSTRUCTIVE SLEEP APNEA SYNDROME: ICD-10-CM

## 2019-07-08 DIAGNOSIS — I10 ESSENTIAL HYPERTENSION: Primary | ICD-10-CM

## 2019-07-08 PROCEDURE — 99214 OFFICE O/P EST MOD 30 MIN: CPT | Performed by: INTERNAL MEDICINE

## 2019-07-08 NOTE — PROGRESS NOTES
[address] WEIGHT LOSS CLINIC  [address] [place] [address]  Dept: [phone]         Patient:  Lala Angelucci  :      1960  MRN:      [id number]    Chief Complaint:  Patient presents with:   Follow - Up: Weight MRCP 2/2015   • Pneumonia, organism unspecified(486)    • Pre-diabetes    • Seizure disorder (HCC)     olefactory seizures history not since age 32   • Spondylosis of cervical region without myelopathy or radiculopathy 11/21/2015    MRI [facility] 11-15   • Crowpe 40 MG Oral Capsule Delayed Release Take 1 capsule (40 mg total) by mouth daily.  1 p.o. twice daily 1/2-hour prior to breakfast and dinner for 1 month then 1 p.o. every morning Disp: 90 capsule Rfl: 2   Prochlorperazine Maleate (COMPAZINE) 10 mg tablet TAKE Naloxone HCl 4 MG/0.1ML Nasal Liquid 4 mg by Nasal route as needed. Disp: 1 each Rfl: 1   Lidocaine HCl 3 % External Cream Apply 1 Application topically 4 (four) times daily as needed.  Disp: 85 g Rfl: 0   Diclofenac Sodium (VOLTAREN) 1 % Transdermal Gel on file        Minutes per session: Not on file      Stress: Not on file    Relationships      Social connections:        Talks on phone: Not on file        Gets together: Not on file        Attends Pentecostal service: Not on file        Active member of cl MANAGEMENT   • LUMBAR EPIDURAL N/A 5/8/2018    Performed by Susan Avila MD at [address]   • OTHER      2013, and 2014- deborah joint toes/ had bone grafts with stem cells recreated both toes/       • OTHER  had numerous surgeries    Gr day, Plan meals in advance, Read nutrition labels, Drink 64 oz of water per day, Maintain a daily food journal, No drinking 30 minutes before or after meals, Utlize portion control strategies to reduce calorie intake, Identify triggers for eating and manag syndrome  Encounter for therapeutic drug monitoring  Obesity (bmi 30-39. 9)    PLAN   No orders of the defined types were placed in this encounter. Patient is not interested in bariatric surgery.  Patient desires to pursue traditional weight loss at this

## 2019-07-25 RX ORDER — ATORVASTATIN CALCIUM 20 MG/1
TABLET, FILM COATED ORAL
Qty: 30 TABLET | Refills: 3 | OUTPATIENT
Start: 2019-07-25

## 2019-07-25 RX ORDER — ATORVASTATIN CALCIUM 20 MG/1
20 TABLET, FILM COATED ORAL DAILY
Qty: 90 TABLET | Refills: 2 | Status: SHIPPED | OUTPATIENT
Start: 2019-07-25 | End: 2020-02-15 | Stop reason: SDUPTHER

## 2019-08-03 ENCOUNTER — HOSPITAL (OUTPATIENT)
Dept: OTHER | Age: 59
End: 2019-08-03

## 2019-08-03 LAB
ALBUMIN SERPL-MCNC: 4 G/DL (ref 3.6–5.1)
ALP SERPL-CCNC: 131 UNITS/L (ref 45–117)
ALT SERPL-CCNC: 40 UNITS/L
AMORPH SED URNS QL MICRO: NORMAL
ANALYZER ANC (IANC): NORMAL
ANION GAP SERPL CALC-SCNC: 9 MMOL/L (ref 10–20)
APPEARANCE UR: CLEAR
AST SERPL-CCNC: 30 UNITS/L
BACTERIA #/AREA URNS HPF: NORMAL /HPF
BASOPHILS # BLD: 0 K/MCL (ref 0–0.3)
BASOPHILS NFR BLD: 1 %
BILIRUB CONJ SERPL-MCNC: 0.1 MG/DL (ref 0–0.2)
BILIRUB SERPL-MCNC: 0.3 MG/DL (ref 0.2–1)
BILIRUB UR QL: NEGATIVE
BUN SERPL-MCNC: 16 MG/DL (ref 6–20)
BUN/CREAT SERPL: 21 (ref 7–25)
CALCIUM SERPL-MCNC: 9.1 MG/DL (ref 8.4–10.2)
CAOX CRY URNS QL MICRO: NORMAL
CHLORIDE SERPL-SCNC: 103 MMOL/L (ref 98–107)
CO2 SERPL-SCNC: 31 MMOL/L (ref 21–32)
COLOR UR: YELLOW
CREAT SERPL-MCNC: 0.77 MG/DL (ref 0.51–0.95)
DIFFERENTIAL METHOD BLD: NORMAL
EOSINOPHIL # BLD: 0.1 K/MCL (ref 0.1–0.5)
EOSINOPHIL NFR BLD: 1 %
EPITH CASTS #/AREA URNS LPF: NORMAL /[LPF]
ERYTHROCYTE [DISTWIDTH] IN BLOOD: 13.4 % (ref 11–15)
FATTY CASTS #/AREA URNS LPF: NORMAL /[LPF]
GLUCOSE SERPL-MCNC: 101 MG/DL (ref 65–99)
GLUCOSE UR-MCNC: NEGATIVE MG/DL
GRAN CASTS #/AREA URNS LPF: NORMAL /[LPF]
HCT VFR BLD CALC: 42.8 % (ref 36–46.5)
HGB BLD-MCNC: 14 G/DL (ref 12–15.5)
HGB UR QL: NEGATIVE
HYALINE CASTS #/AREA URNS LPF: NORMAL /LPF (ref 0–5)
IMM GRANULOCYTES # BLD AUTO: 0 K/MCL (ref 0–0.2)
IMM GRANULOCYTES NFR BLD: 0 %
KETONES UR-MCNC: NEGATIVE MG/DL
LEUKOCYTE ESTERASE UR QL STRIP: NEGATIVE
LIPASE SERPL-CCNC: 110 UNITS/L (ref 73–393)
LYMPHOCYTES # BLD: 1.6 K/MCL (ref 1–4)
LYMPHOCYTES NFR BLD: 29 %
MCH RBC QN AUTO: 30.4 PG (ref 26–34)
MCHC RBC AUTO-ENTMCNC: 32.7 G/DL (ref 32–36.5)
MCV RBC AUTO: 92.8 FL (ref 78–100)
MIXED CELL CASTS #/AREA URNS LPF: NORMAL /[LPF]
MONOCYTES # BLD: 0.6 K/MCL (ref 0.3–0.9)
MONOCYTES NFR BLD: 11 %
MUCOUS THREADS URNS QL MICRO: NORMAL
NEUTROPHILS # BLD: 3.1 K/MCL (ref 1.8–7.7)
NEUTROPHILS NFR BLD: 58 %
NEUTS SEG NFR BLD: NORMAL %
NITRITE UR QL: NEGATIVE
NRBC (NRBCRE): 0 /100 WBC
PH UR: 6 UNITS (ref 5–7)
PLATELET # BLD: 214 K/MCL (ref 140–450)
POTASSIUM SERPL-SCNC: 3.8 MMOL/L (ref 3.4–5.1)
PROT SERPL-MCNC: 7.8 G/DL (ref 6.4–8.2)
PROT UR QL: NEGATIVE MG/DL
RBC # BLD: 4.61 MIL/MCL (ref 4–5.2)
RBC #/AREA URNS HPF: NORMAL /HPF (ref 0–2)
RBC CASTS #/AREA URNS LPF: NORMAL /[LPF]
RENAL EPI CELLS #/AREA URNS HPF: NORMAL /[HPF]
SODIUM SERPL-SCNC: 139 MMOL/L (ref 135–145)
SP GR UR: 1.01 (ref 1–1.03)
SPECIMEN SOURCE: NORMAL
SPERM URNS QL MICRO: NORMAL
SQUAMOUS #/AREA URNS HPF: NORMAL /HPF (ref 0–5)
T VAGINALIS URNS QL MICRO: NORMAL
TRI-PHOS CRY URNS QL MICRO: NORMAL
URATE CRY URNS QL MICRO: NORMAL
URINE REFLEX: NORMAL
URNS CMNT MICRO: NORMAL
UROBILINOGEN UR QL: 0.2 MG/DL (ref 0–1)
WAXY CASTS #/AREA URNS LPF: NORMAL /[LPF]
WBC # BLD: 5.3 K/MCL (ref 4.2–11)
WBC #/AREA URNS HPF: NORMAL /HPF (ref 0–5)
WBC CASTS #/AREA URNS LPF: NORMAL /[LPF]
YEAST HYPHAE URNS QL MICRO: NORMAL
YEAST URNS QL MICRO: NORMAL

## 2019-08-04 ENCOUNTER — HOSPITAL (OUTPATIENT)
Dept: OTHER | Age: 59
End: 2019-08-04

## 2019-08-04 LAB
ALBUMIN SERPL-MCNC: 4.1 G/DL (ref 3.6–5.1)
ALBUMIN/GLOB SERPL: 1 {RATIO} (ref 1–2.4)
ALP SERPL-CCNC: 135 UNITS/L (ref 45–117)
ALT SERPL-CCNC: 41 UNITS/L
AMORPH SED URNS QL MICRO: ABNORMAL
ANALYZER ANC (IANC): NORMAL
ANION GAP SERPL CALC-SCNC: 9 MMOL/L (ref 10–20)
APPEARANCE UR: CLEAR
AST SERPL-CCNC: 32 UNITS/L
AST SERPL-CCNC: ABNORMAL U/L
BACTERIA #/AREA URNS HPF: ABNORMAL /HPF
BASOPHILS # BLD: 0 K/MCL (ref 0–0.3)
BASOPHILS NFR BLD: 0 %
BILIRUB SERPL-MCNC: 0.5 MG/DL (ref 0.2–1)
BILIRUB UR QL: NEGATIVE
BUN SERPL-MCNC: 12 MG/DL (ref 6–20)
BUN/CREAT SERPL: 17 (ref 7–25)
CALCIUM SERPL-MCNC: 9.4 MG/DL (ref 8.4–10.2)
CAOX CRY URNS QL MICRO: ABNORMAL
CHLORIDE SERPL-SCNC: 103 MMOL/L (ref 98–107)
CO2 SERPL-SCNC: 32 MMOL/L (ref 21–32)
COLOR UR: ABNORMAL
CREAT SERPL-MCNC: 0.7 MG/DL (ref 0.51–0.95)
DIFFERENTIAL METHOD BLD: NORMAL
EOSINOPHIL # BLD: 0.1 K/MCL (ref 0.1–0.5)
EOSINOPHIL NFR BLD: 1 %
EPITH CASTS #/AREA URNS LPF: ABNORMAL /[LPF]
ERYTHROCYTE [DISTWIDTH] IN BLOOD: 13.5 % (ref 11–15)
FATTY CASTS #/AREA URNS LPF: ABNORMAL /[LPF]
GLOBULIN SER-MCNC: 4.1 G/DL (ref 2–4)
GLUCOSE SERPL-MCNC: 147 MG/DL (ref 65–99)
GLUCOSE UR-MCNC: NEGATIVE MG/DL
GRAN CASTS #/AREA URNS LPF: ABNORMAL /[LPF]
HCT VFR BLD CALC: 44 % (ref 36–46.5)
HGB BLD-MCNC: 14.4 G/DL (ref 12–15.5)
HGB UR QL: NEGATIVE
HYALINE CASTS #/AREA URNS LPF: ABNORMAL /LPF (ref 0–5)
IMM GRANULOCYTES # BLD AUTO: 0 K/MCL (ref 0–0.2)
IMM GRANULOCYTES NFR BLD: 0 %
KETONES UR-MCNC: NEGATIVE MG/DL
LEUKOCYTE ESTERASE UR QL STRIP: NEGATIVE
LIPASE SERPL-CCNC: 99 UNITS/L (ref 73–393)
LYMPHOCYTES # BLD: 1.1 K/MCL (ref 1–4)
LYMPHOCYTES NFR BLD: 22 %
MCH RBC QN AUTO: 30.4 PG (ref 26–34)
MCHC RBC AUTO-ENTMCNC: 32.7 G/DL (ref 32–36.5)
MCV RBC AUTO: 92.8 FL (ref 78–100)
MIXED CELL CASTS #/AREA URNS LPF: ABNORMAL /[LPF]
MONOCYTES # BLD: 0.5 K/MCL (ref 0.3–0.9)
MONOCYTES NFR BLD: 9 %
MUCOUS THREADS URNS QL MICRO: ABNORMAL
NEUTROPHILS # BLD: 3.3 K/MCL (ref 1.8–7.7)
NEUTROPHILS NFR BLD: 68 %
NEUTS SEG NFR BLD: NORMAL %
NITRITE UR QL: NEGATIVE
NRBC (NRBCRE): 0 /100 WBC
PH UR: 5 UNITS (ref 5–7)
PLATELET # BLD: 194 K/MCL (ref 140–450)
POTASSIUM SERPL-SCNC: 3.9 MMOL/L (ref 3.4–5.1)
POTASSIUM SERPL-SCNC: ABNORMAL MMOL/L
PROT SERPL-MCNC: 8.2 G/DL (ref 6.4–8.2)
PROT UR QL: NEGATIVE MG/DL
RBC # BLD: 4.74 MIL/MCL (ref 4–5.2)
RBC #/AREA URNS HPF: ABNORMAL /HPF (ref 0–2)
RBC CASTS #/AREA URNS LPF: ABNORMAL /[LPF]
RENAL EPI CELLS #/AREA URNS HPF: ABNORMAL /[HPF]
SODIUM SERPL-SCNC: 140 MMOL/L (ref 135–145)
SP GR UR: 1.01 (ref 1–1.03)
SPECIMEN SOURCE: ABNORMAL
SPERM URNS QL MICRO: ABNORMAL
SQUAMOUS #/AREA URNS HPF: ABNORMAL /HPF (ref 0–5)
T VAGINALIS URNS QL MICRO: ABNORMAL
TRI-PHOS CRY URNS QL MICRO: ABNORMAL
URATE CRY URNS QL MICRO: ABNORMAL
URINE REFLEX: ABNORMAL
URNS CMNT MICRO: ABNORMAL
UROBILINOGEN UR QL: 0.2 MG/DL (ref 0–1)
WAXY CASTS #/AREA URNS LPF: ABNORMAL /[LPF]
WBC # BLD: 5 K/MCL (ref 4.2–11)
WBC #/AREA URNS HPF: ABNORMAL /HPF (ref 0–5)
WBC CASTS #/AREA URNS LPF: ABNORMAL /[LPF]
YEAST HYPHAE URNS QL MICRO: ABNORMAL
YEAST URNS QL MICRO: ABNORMAL

## 2019-08-05 ENCOUNTER — TELEPHONE (OUTPATIENT)
Dept: CARDIOLOGY | Age: 59
End: 2019-08-05

## 2019-08-05 RX ORDER — POTASSIUM CHLORIDE 750 MG/1
CAPSULE, EXTENDED RELEASE ORAL
Qty: 168 CAPSULE | Refills: 2 | Status: SHIPPED | OUTPATIENT
Start: 2019-08-05 | End: 2019-11-01 | Stop reason: SDUPTHER

## 2019-08-06 RX ORDER — POTASSIUM CHLORIDE 750 MG/1
CAPSULE, EXTENDED RELEASE ORAL
Qty: 138 CAPSULE | Refills: 4 | OUTPATIENT
Start: 2019-08-06

## 2019-08-09 ENCOUNTER — APPOINTMENT (OUTPATIENT)
Dept: NEUROSURGERY | Age: 59
End: 2019-08-09

## 2019-08-13 PROBLEM — R63.2 BINGE EATING: Status: RESOLVED | Noted: 2017-11-09 | Resolved: 2019-08-13

## 2019-08-13 PROBLEM — E21.3 HYPERPARATHYROIDISM (HCC): Status: ACTIVE | Noted: 2019-08-13

## 2019-08-14 PROCEDURE — 84080 ASSAY ALKALINE PHOSPHATASES: CPT | Performed by: INTERNAL MEDICINE

## 2019-08-14 PROCEDURE — 84075 ASSAY ALKALINE PHOSPHATASE: CPT | Performed by: INTERNAL MEDICINE

## 2019-08-19 RX ORDER — LINACLOTIDE 145 UG/1
CAPSULE, GELATIN COATED ORAL
Qty: 90 CAPSULE | Refills: 0 | Status: SHIPPED | OUTPATIENT
Start: 2019-08-19 | End: 2019-09-10

## 2019-09-11 ENCOUNTER — TELEPHONE (OUTPATIENT)
Dept: SURGERY | Facility: CLINIC | Age: 59
End: 2019-09-11

## 2019-09-11 RX ORDER — LINACLOTIDE 145 UG/1
CAPSULE, GELATIN COATED ORAL
Qty: 90 CAPSULE | Refills: 0 | Status: SHIPPED | OUTPATIENT
Start: 2019-09-11 | End: 2020-03-09

## 2019-09-11 NOTE — TELEPHONE ENCOUNTER
Pt left voicemail on office VM line stating she feels \"too sick to come in to appointment today\" and that she \"needs a change in her medications\".   Dr. Ferrera Angry please call patient back on mobile # [phone]

## 2019-09-11 NOTE — TELEPHONE ENCOUNTER
Spoke to patient    Diarrhea for 5 days    Advised to stop linzess  May need C. Diff study    Should follow up with PCP or let me know if diarrhea continues. May also come in to urgent care if needed.

## 2019-09-12 ENCOUNTER — TELEPHONE (OUTPATIENT)
Dept: SURGERY | Facility: CLINIC | Age: 59
End: 2019-09-12

## 2019-10-14 ENCOUNTER — OFFICE VISIT (OUTPATIENT)
Dept: SURGERY | Facility: CLINIC | Age: 59
End: 2019-10-14
Payer: MEDICARE

## 2019-10-14 VITALS
OXYGEN SATURATION: 99 % | HEIGHT: 61.38 IN | BODY MASS INDEX: 33.14 KG/M2 | DIASTOLIC BLOOD PRESSURE: 79 MMHG | WEIGHT: 177.81 LBS | SYSTOLIC BLOOD PRESSURE: 113 MMHG | HEART RATE: 98 BPM

## 2019-10-14 DIAGNOSIS — I10 ESSENTIAL HYPERTENSION: ICD-10-CM

## 2019-10-14 DIAGNOSIS — E78.2 MIXED HYPERLIPIDEMIA: Primary | ICD-10-CM

## 2019-10-14 DIAGNOSIS — Z51.81 ENCOUNTER FOR THERAPEUTIC DRUG MONITORING: ICD-10-CM

## 2019-10-14 DIAGNOSIS — E66.9 OBESITY (BMI 30-39.9): ICD-10-CM

## 2019-10-14 PROCEDURE — 99214 OFFICE O/P EST MOD 30 MIN: CPT | Performed by: INTERNAL MEDICINE

## 2019-10-14 RX ORDER — POLYETHYLENE GLYCOL 3350, SODIUM SULFATE ANHYDROUS, SODIUM BICARBONATE, SODIUM CHLORIDE, POTASSIUM CHLORIDE 236; 22.74; 6.74; 5.86; 2.97 G/4L; G/4L; G/4L; G/4L; G/4L
POWDER, FOR SOLUTION ORAL
COMMUNITY
Start: 2019-10-09 | End: 2019-12-15
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
## 2019-10-17 RX ORDER — TORSEMIDE 20 MG/1
TABLET ORAL
Qty: 270 TABLET | Refills: 1 | Status: SHIPPED | OUTPATIENT
Start: 2019-10-17 | End: 2020-07-13 | Stop reason: SDUPTHER

## 2019-10-22 ENCOUNTER — HOSPITAL (OUTPATIENT)
Dept: OTHER | Age: 59
End: 2019-10-22

## 2019-10-22 ENCOUNTER — DIAGNOSTIC TRANS (OUTPATIENT)
Dept: OTHER | Age: 59
End: 2019-10-22

## 2019-10-22 LAB
ALBUMIN SERPL-MCNC: 3.3 G/DL (ref 3.6–5.1)
ALP SERPL-CCNC: 104 UNITS/L (ref 45–117)
ALT SERPL-CCNC: 63 UNITS/L
ANALYZER ANC (IANC): ABNORMAL
ANION GAP SERPL CALC-SCNC: 12 MMOL/L (ref 10–20)
AST SERPL-CCNC: 47 UNITS/L
AST SERPL-CCNC: ABNORMAL U/L
BASOPHILS # BLD: 0 K/MCL (ref 0–0.3)
BASOPHILS NFR BLD: 0 %
BILIRUB CONJ SERPL-MCNC: 0.1 MG/DL (ref 0–0.2)
BILIRUB CONJ SERPL-MCNC: ABNORMAL MG/DL
BILIRUB SERPL-MCNC: 0.3 MG/DL (ref 0.2–1)
BUN SERPL-MCNC: 20 MG/DL (ref 6–20)
BUN/CREAT SERPL: 27 (ref 7–25)
CALCIUM SERPL-MCNC: 8.5 MG/DL (ref 8.4–10.2)
CHLORIDE SERPL-SCNC: 108 MMOL/L (ref 98–107)
CO2 SERPL-SCNC: 21 MMOL/L (ref 21–32)
CREAT SERPL-MCNC: 0.74 MG/DL (ref 0.51–0.95)
DIFFERENTIAL METHOD BLD: ABNORMAL
EOSINOPHIL # BLD: 0 K/MCL (ref 0.1–0.5)
EOSINOPHIL NFR BLD: 0 %
ERYTHROCYTE [DISTWIDTH] IN BLOOD: 12 % (ref 11–15)
GLUCOSE SERPL-MCNC: 274 MG/DL (ref 65–99)
HCT VFR BLD CALC: 27.6 % (ref 36–46.5)
HCT VFR BLD CALC: 33.1 % (ref 36–46.5)
HGB BLD-MCNC: 11.1 G/DL (ref 12–15.5)
HGB BLD-MCNC: 9.4 G/DL (ref 12–15.5)
IMM GRANULOCYTES # BLD AUTO: ABNORMAL 10*3/UL
IMM GRANULOCYTES NFR BLD: ABNORMAL %
LIPASE SERPL-CCNC: 129 UNITS/L (ref 73–393)
LYMPHOCYTES # BLD: 0.6 K/MCL (ref 1–4)
LYMPHOCYTES NFR BLD: 5 %
MCH RBC QN AUTO: 31.5 PG (ref 26–34)
MCHC RBC AUTO-ENTMCNC: 33.5 G/DL (ref 32–36.5)
MCV RBC AUTO: 94 FL (ref 78–100)
MONOCYTES # BLD: 0.1 K/MCL (ref 0.3–0.9)
MONOCYTES NFR BLD: 1 %
NEUTROPHILS # BLD: 12.2 K/MCL (ref 1.8–7.7)
NEUTROPHILS NFR BLD: 94 %
NEUTS SEG NFR BLD: ABNORMAL %
NRBC (NRBCRE): 0 /100 WBC
PLATELET # BLD: 280 K/MCL (ref 140–450)
POTASSIUM SERPL-SCNC: 4 MMOL/L (ref 3.4–5.1)
POTASSIUM SERPL-SCNC: ABNORMAL MMOL/L
PROT SERPL-MCNC: 6.4 G/DL (ref 6.4–8.2)
RBC # BLD: 3.52 MIL/MCL (ref 4–5.2)
SODIUM SERPL-SCNC: 137 MMOL/L (ref 135–145)
WBC # BLD: 13.1 K/MCL (ref 4.2–11)

## 2019-10-22 PROCEDURE — 88304 TISSUE EXAM BY PATHOLOGIST: CPT | Performed by: SURGERY

## 2019-10-23 LAB
ALBUMIN SERPL-MCNC: 3.3 G/DL (ref 3.6–5.1)
ALBUMIN/GLOB SERPL: 1.2 {RATIO} (ref 1–2.4)
ALP SERPL-CCNC: 87 UNITS/L (ref 45–117)
ALT SERPL-CCNC: 49 UNITS/L
ANALYZER ANC (IANC): ABNORMAL
ANALYZER ANC (IANC): ABNORMAL
ANION GAP SERPL CALC-SCNC: 12 MMOL/L (ref 10–20)
ANION GAP SERPL CALC-SCNC: 9 MMOL/L (ref 10–20)
AST SERPL-CCNC: 37 UNITS/L
BASOPHILS # BLD: 0 K/MCL (ref 0–0.3)
BASOPHILS NFR BLD: 1 %
BILIRUB SERPL-MCNC: 0.3 MG/DL (ref 0.2–1)
BUN SERPL-MCNC: 16 MG/DL (ref 6–20)
BUN SERPL-MCNC: 18 MG/DL (ref 6–20)
BUN/CREAT SERPL: 17 (ref 7–25)
BUN/CREAT SERPL: 23 (ref 7–25)
CALCIUM SERPL-MCNC: 8.4 MG/DL (ref 8.4–10.2)
CALCIUM SERPL-MCNC: 8.7 MG/DL (ref 8.4–10.2)
CHLORIDE SERPL-SCNC: 107 MMOL/L (ref 98–107)
CHLORIDE SERPL-SCNC: 109 MMOL/L (ref 98–107)
CO2 SERPL-SCNC: 23 MMOL/L (ref 21–32)
CO2 SERPL-SCNC: 28 MMOL/L (ref 21–32)
CREAT SERPL-MCNC: 0.78 MG/DL (ref 0.51–0.95)
CREAT SERPL-MCNC: 0.94 MG/DL (ref 0.51–0.95)
DIFFERENTIAL METHOD BLD: ABNORMAL
EOSINOPHIL # BLD: 0 K/MCL (ref 0.1–0.5)
EOSINOPHIL NFR BLD: 0 %
ERYTHROCYTE [DISTWIDTH] IN BLOOD: 12.5 % (ref 11–15)
ERYTHROCYTE [DISTWIDTH] IN BLOOD: 12.8 % (ref 11–15)
GLOBULIN SER-MCNC: 2.7 G/DL (ref 2–4)
GLUCOSE BLDC GLUCOMTR-MCNC: 113 MG/DL (ref 70–99)
GLUCOSE BLDC GLUCOMTR-MCNC: 85 MG/DL (ref 70–99)
GLUCOSE BLDC GLUCOMTR-MCNC: NORMAL MG/DL
GLUCOSE SERPL-MCNC: 121 MG/DL (ref 65–99)
GLUCOSE SERPL-MCNC: 79 MG/DL (ref 65–99)
HCT VFR BLD CALC: 20.9 % (ref 36–46.5)
HCT VFR BLD CALC: 21.2 % (ref 36–46.5)
HCT VFR BLD CALC: 24 % (ref 36–46.5)
HGB BLD-MCNC: 6.8 G/DL (ref 12–15.5)
HGB BLD-MCNC: 7.1 G/DL (ref 12–15.5)
HGB BLD-MCNC: 8.2 G/DL (ref 12–15.5)
HGB BLD-MCNC: ABNORMAL G/DL
HGB BLD-MCNC: ABNORMAL G/DL
IMM GRANULOCYTES # BLD AUTO: 0.1 K/MCL (ref 0–0.2)
IMM GRANULOCYTES NFR BLD: 1 %
INR PPP: 1.1
INR PPP: NORMAL
LYMPHOCYTES # BLD: 2.3 K/MCL (ref 1–4)
LYMPHOCYTES NFR BLD: 28 %
MCH RBC QN AUTO: 31.2 PG (ref 26–34)
MCH RBC QN AUTO: 31.9 PG (ref 26–34)
MCHC RBC AUTO-ENTMCNC: 32.5 G/DL (ref 32–36.5)
MCHC RBC AUTO-ENTMCNC: 34.2 G/DL (ref 32–36.5)
MCV RBC AUTO: 93.4 FL (ref 78–100)
MCV RBC AUTO: 95.9 FL (ref 78–100)
MONOCYTES # BLD: 0.9 K/MCL (ref 0.3–0.9)
MONOCYTES NFR BLD: 11 %
NEUTROPHILS # BLD: 5 K/MCL (ref 1.8–7.7)
NEUTROPHILS NFR BLD: 59 %
NEUTS SEG NFR BLD: ABNORMAL %
NRBC (NRBCRE): 0 /100 WBC
NRBC (NRBCRE): 0 /100 WBC
PLATELET # BLD: 153 K/MCL (ref 140–450)
PLATELET # BLD: 192 K/MCL (ref 140–450)
POTASSIUM SERPL-SCNC: 4 MMOL/L (ref 3.4–5.1)
POTASSIUM SERPL-SCNC: 4.4 MMOL/L (ref 3.4–5.1)
PROT SERPL-MCNC: 6 G/DL (ref 6.4–8.2)
PROTHROMBIN TIME (PRT2): NORMAL
PROTHROMBIN TIME: 11.1 SEC (ref 9.7–11.8)
RBC # BLD: 2.18 MIL/MCL (ref 4–5.2)
RBC # BLD: 2.57 MIL/MCL (ref 4–5.2)
SODIUM SERPL-SCNC: 138 MMOL/L (ref 135–145)
SODIUM SERPL-SCNC: 142 MMOL/L (ref 135–145)
WBC # BLD: 11.2 K/MCL (ref 4.2–11)
WBC # BLD: 8.3 K/MCL (ref 4.2–11)

## 2019-10-24 LAB
ANALYZER ANC (IANC): ABNORMAL
ERYTHROCYTE [DISTWIDTH] IN BLOOD: 13.6 % (ref 11–15)
HCT VFR BLD CALC: 21.8 % (ref 36–46.5)
HCT VFR BLD CALC: 25.4 % (ref 36–46.5)
HCT VFR BLD CALC: 27.9 % (ref 36–46.5)
HGB BLD-MCNC: 7.2 G/DL (ref 12–15.5)
HGB BLD-MCNC: 8.5 G/DL (ref 12–15.5)
HGB BLD-MCNC: 9.4 G/DL (ref 12–15.5)
LACTATE BLDV-SCNC: 0.9 MMOL/L (ref 0–2)
MCH RBC QN AUTO: 31.2 PG (ref 26–34)
MCHC RBC AUTO-ENTMCNC: 33 G/DL (ref 32–36.5)
MCV RBC AUTO: 94.4 FL (ref 78–100)
MRSA DNA SPEC QL NAA+PROBE: NORMAL
MRSA DNA SPEC QL NAA+PROBE: NORMAL
MRSA DNA SPEC QL NAA+PROBE: NOT DETECTED
NRBC (NRBCRE): 0 /100 WBC
PLATELET # BLD: 150 K/MCL (ref 140–450)
RBC # BLD: 2.31 MIL/MCL (ref 4–5.2)
SPECIMEN SOURCE: NORMAL
WBC # BLD: 6 K/MCL (ref 4.2–11)

## 2019-10-25 LAB
ANALYZER ANC (IANC): ABNORMAL
ANION GAP SERPL CALC-SCNC: 9 MMOL/L (ref 10–20)
BUN SERPL-MCNC: 10 MG/DL (ref 6–20)
BUN/CREAT SERPL: 14 (ref 7–25)
CALCIUM SERPL-MCNC: 8.7 MG/DL (ref 8.4–10.2)
CHLORIDE SERPL-SCNC: 106 MMOL/L (ref 98–107)
CO2 SERPL-SCNC: 27 MMOL/L (ref 21–32)
CREAT SERPL-MCNC: 0.73 MG/DL (ref 0.51–0.95)
ERYTHROCYTE [DISTWIDTH] IN BLOOD: 13.9 % (ref 11–15)
GLUCOSE SERPL-MCNC: 99 MG/DL (ref 65–99)
HCT VFR BLD CALC: 25.3 % (ref 36–46.5)
HCT VFR BLD CALC: 26.3 % (ref 36–46.5)
HCT VFR BLD CALC: 27.4 % (ref 36–46.5)
HGB BLD-MCNC: 8.7 G/DL (ref 12–15.5)
HGB BLD-MCNC: 9 G/DL (ref 12–15.5)
HGB BLD-MCNC: 9.5 G/DL (ref 12–15.5)
MAGNESIUM SERPL-MCNC: 1.9 MG/DL (ref 1.7–2.4)
MAGNESIUM SERPL-MCNC: NORMAL MG/DL
MCH RBC QN AUTO: 31.1 PG (ref 26–34)
MCHC RBC AUTO-ENTMCNC: 34.2 G/DL (ref 32–36.5)
MCV RBC AUTO: 91 FL (ref 78–100)
NRBC (NRBCRE): 0 /100 WBC
PHOSPHATE SERPL-MCNC: 3.3 MG/DL (ref 2.4–4.7)
PLATELET # BLD: 139 K/MCL (ref 140–450)
POTASSIUM SERPL-SCNC: 3.3 MMOL/L (ref 3.4–5.1)
POTASSIUM SERPL-SCNC: 3.4 MMOL/L (ref 3.4–5.1)
POTASSIUM SERPL-SCNC: ABNORMAL MMOL/L
RBC # BLD: 2.89 MIL/MCL (ref 4–5.2)
SODIUM SERPL-SCNC: 139 MMOL/L (ref 135–145)
WBC # BLD: 6 K/MCL (ref 4.2–11)

## 2019-10-26 LAB
ANALYZER ANC (IANC): ABNORMAL
ANION GAP SERPL CALC-SCNC: 8 MMOL/L (ref 10–20)
BUN SERPL-MCNC: 11 MG/DL (ref 6–20)
BUN/CREAT SERPL: 14 (ref 7–25)
CALCIUM SERPL-MCNC: 8.8 MG/DL (ref 8.4–10.2)
CHLORIDE SERPL-SCNC: 103 MMOL/L (ref 98–107)
CO2 SERPL-SCNC: 34 MMOL/L (ref 21–32)
CREAT SERPL-MCNC: 0.8 MG/DL (ref 0.51–0.95)
ERYTHROCYTE [DISTWIDTH] IN BLOOD: 13.2 % (ref 11–15)
GLUCOSE SERPL-MCNC: 117 MG/DL (ref 65–99)
HCT VFR BLD CALC: 27.6 % (ref 36–46.5)
HGB BLD-MCNC: 9.3 G/DL (ref 12–15.5)
MCH RBC QN AUTO: 31 PG (ref 26–34)
MCHC RBC AUTO-ENTMCNC: 33.7 G/DL (ref 32–36.5)
MCV RBC AUTO: 92 FL (ref 78–100)
NRBC (NRBCRE): 0 /100 WBC
PLATELET # BLD: 153 K/MCL (ref 140–450)
POTASSIUM SERPL-SCNC: 3.5 MMOL/L (ref 3.4–5.1)
RBC # BLD: 3 MIL/MCL (ref 4–5.2)
SODIUM SERPL-SCNC: 141 MMOL/L (ref 135–145)
WBC # BLD: 5.5 K/MCL (ref 4.2–11)

## 2019-11-01 RX ORDER — POTASSIUM CHLORIDE 750 MG/1
CAPSULE, EXTENDED RELEASE ORAL
Qty: 168 CAPSULE | Refills: 1 | Status: SHIPPED | OUTPATIENT
Start: 2019-11-01 | End: 2019-11-06 | Stop reason: SDUPTHER

## 2019-11-05 RX ORDER — POTASSIUM CHLORIDE 750 MG/1
CAPSULE, EXTENDED RELEASE ORAL
Qty: 168 CAPSULE | Refills: 1 | Status: CANCELLED | OUTPATIENT
Start: 2019-11-05

## 2019-11-06 ENCOUNTER — TELEPHONE (OUTPATIENT)
Dept: CARDIOLOGY | Age: 59
End: 2019-11-06

## 2019-11-06 RX ORDER — POTASSIUM CHLORIDE 750 MG/1
CAPSULE, EXTENDED RELEASE ORAL
Qty: 168 CAPSULE | Refills: 1 | Status: SHIPPED | OUTPATIENT
Start: 2019-11-06 | End: 2019-12-29 | Stop reason: SDUPTHER

## 2019-11-25 PROBLEM — I50.30 ACC/AHA STAGE B DIASTOLIC HEART FAILURE (HCC): Status: ACTIVE | Noted: 2019-11-25

## 2019-11-27 RX ORDER — POTASSIUM CHLORIDE 750 MG/1
TABLET, EXTENDED RELEASE ORAL
Qty: 24 TABLET | Refills: 1 | OUTPATIENT
Start: 2019-11-27

## 2019-12-02 ENCOUNTER — TELEPHONE (OUTPATIENT)
Dept: CARDIOLOGY | Age: 59
End: 2019-12-02

## 2019-12-17 ENCOUNTER — OFFICE VISIT (OUTPATIENT)
Dept: SURGERY | Facility: CLINIC | Age: 59
End: 2019-12-17
Payer: MEDICARE

## 2019-12-17 VITALS
DIASTOLIC BLOOD PRESSURE: 77 MMHG | WEIGHT: 166 LBS | SYSTOLIC BLOOD PRESSURE: 119 MMHG | BODY MASS INDEX: 31.34 KG/M2 | HEART RATE: 95 BPM | OXYGEN SATURATION: 95 % | HEIGHT: 61 IN

## 2019-12-17 DIAGNOSIS — G47.33 OBSTRUCTIVE SLEEP APNEA SYNDROME: ICD-10-CM

## 2019-12-17 DIAGNOSIS — E66.9 OBESITY (BMI 30-39.9): ICD-10-CM

## 2019-12-17 DIAGNOSIS — I10 ESSENTIAL HYPERTENSION: Primary | ICD-10-CM

## 2019-12-17 DIAGNOSIS — K21.9 GASTROESOPHAGEAL REFLUX DISEASE WITHOUT ESOPHAGITIS: ICD-10-CM

## 2019-12-17 PROCEDURE — 99214 OFFICE O/P EST MOD 30 MIN: CPT | Performed by: INTERNAL MEDICINE

## 2019-12-17 NOTE — PROGRESS NOTES
[address] WEIGHT LOSS CLINIC  84 [facility] 91 [facility] [address]  Dept: [phone]         Patient:  Avtar Jeronimo  :      1960  MRN:      [id number]    Chief Complaint:  Patient presents with:   Follow - Up: Weight hyperlipidemia    • Pancreatic divisum 2/24/2015    MRCP 2/2015   • Pneumonia, organism unspecified(486)    • Pre-diabetes    • Seizure disorder (HCC)     olefactory seizures history not since age 32   • Spondylosis of cervical region without myelopathy or (COMPAZINE) 10 mg tablet TAKE 1 TABLET BY MOUTH EVERY 6 HOURS AS NEEDED 40 tablet 2   • Probiotic Product (PROBIOTIC OR) Take by mouth. • PREBIOTIC PRODUCT OR Take by mouth.      • colchicine 0.6 MG Oral Tab Take 1 tablet (0.6 mg total) by mouth 2 (two) Lidocaine HCl 3 % External Cream Apply 1 Application topically 4 (four) times daily as needed. 85 g 0   • Diclofenac Sodium (VOLTAREN) 1 % Transdermal Gel Apply 4 g topically 4 (four) times daily.  1 Tube 3   • FREESTYLE LITE TEST In Vitro Strip TEST SUGARS Relationship status: Not on file      Intimate partner violence:        Fear of current or ex partner: Not on file        Emotionally abused: Not on file        Physically abused: Not on file        Forced sexual activity: Not on file    Other Topics at [facility] N/A 5/8/2018    Performed by Myriam Fair MD at [address]   • OTHER      2013, and 2014- deborah joint toes/ had bone grafts with stem cells recreated both toes/       • OTHER  had n breakfast, Eat 3 meals per day, Plan meals in advance, Read nutrition labels, Drink 64 oz of water per day, Maintain a daily food journal, No drinking 30 minutes before or after meals, Utlize portion control strategies to reduce calorie intake, Identify tr apnea syndrome  Gastroesophageal reflux disease without esophagitis  Obesity (bmi 30-39. 9)    PLAN   No orders of the defined types were placed in this encounter. Patient is not interested in bariatric surgery.  Patient desires to pursue traditional weig

## 2019-12-26 PROBLEM — M79.645 FINGER PAIN, LEFT: Status: ACTIVE | Noted: 2019-12-26

## 2019-12-31 RX ORDER — POTASSIUM CHLORIDE 750 MG/1
CAPSULE, EXTENDED RELEASE ORAL
Qty: 180 CAPSULE | Refills: 3 | Status: SHIPPED | OUTPATIENT
Start: 2019-12-31 | End: 2020-06-23

## 2020-01-08 PROBLEM — Z48.89 AFTERCARE FOLLOWING SURGERY: Status: ACTIVE | Noted: 2020-01-08

## 2020-01-14 LAB — COLONOSCOPY STUDY: NORMAL

## 2020-01-14 PROCEDURE — 88305 TISSUE EXAM BY PATHOLOGIST: CPT | Performed by: INTERNAL MEDICINE

## 2020-02-18 RX ORDER — ATORVASTATIN CALCIUM 20 MG/1
20 TABLET, FILM COATED ORAL EVERY EVENING
Qty: 90 TABLET | Refills: 0 | Status: SHIPPED | OUTPATIENT
Start: 2020-02-18 | End: 2020-06-12

## 2020-02-24 RX ORDER — ATORVASTATIN CALCIUM 20 MG/1
20 TABLET, FILM COATED ORAL DAILY
Qty: 90 TABLET | Refills: 0 | OUTPATIENT
Start: 2020-02-24

## 2020-03-09 RX ORDER — LINACLOTIDE 145 UG/1
CAPSULE, GELATIN COATED ORAL
Qty: 90 CAPSULE | Refills: 0 | Status: SHIPPED | OUTPATIENT
Start: 2020-03-09 | End: 2020-06-22

## 2020-03-23 ENCOUNTER — TELEPHONE (OUTPATIENT)
Dept: SURGERY | Facility: CLINIC | Age: 60
End: 2020-03-23

## 2020-03-26 PROBLEM — Z48.89 AFTERCARE FOLLOWING SURGERY: Status: RESOLVED | Noted: 2020-01-08 | Resolved: 2020-03-26

## 2020-04-21 ENCOUNTER — TELEPHONE (OUTPATIENT)
Dept: SURGERY | Facility: CLINIC | Age: 60
End: 2020-04-21

## 2020-04-23 RX ORDER — LISDEXAMFETAMINE DIMESYLATE 70 MG/1
CAPSULE ORAL
Qty: 30 CAPSULE | Refills: 0 | OUTPATIENT
Start: 2020-04-23

## 2020-04-27 ENCOUNTER — APPOINTMENT (OUTPATIENT)
Dept: CARDIOLOGY | Age: 60
End: 2020-04-27

## 2020-05-01 ENCOUNTER — APPOINTMENT (OUTPATIENT)
Dept: CARDIOLOGY | Age: 60
End: 2020-05-01

## 2020-05-26 RX ORDER — LISDEXAMFETAMINE DIMESYLATE 70 MG/1
CAPSULE ORAL
Qty: 30 CAPSULE | Refills: 0 | OUTPATIENT
Start: 2020-05-26

## 2020-05-28 ENCOUNTER — TELEPHONE (OUTPATIENT)
Dept: SURGERY | Facility: CLINIC | Age: 60
End: 2020-05-28

## 2020-06-12 RX ORDER — ATORVASTATIN CALCIUM 20 MG/1
TABLET, FILM COATED ORAL
Qty: 90 TABLET | Refills: 0 | Status: SHIPPED | OUTPATIENT
Start: 2020-06-12 | End: 2021-07-29

## 2020-06-22 ENCOUNTER — TELEPHONE (OUTPATIENT)
Dept: SURGERY | Facility: CLINIC | Age: 60
End: 2020-06-22

## 2020-06-22 RX ORDER — LINACLOTIDE 145 UG/1
CAPSULE, GELATIN COATED ORAL
Qty: 90 CAPSULE | Refills: 0 | Status: SHIPPED | OUTPATIENT
Start: 2020-06-22 | End: 2020-10-14

## 2020-06-22 RX ORDER — LINACLOTIDE 145 UG/1
CAPSULE, GELATIN COATED ORAL
Qty: 90 CAPSULE | Refills: 0 | Status: SHIPPED | OUTPATIENT
Start: 2020-06-22 | End: 2020-06-22

## 2020-06-23 RX ORDER — POTASSIUM CHLORIDE 750 MG/1
CAPSULE, EXTENDED RELEASE ORAL
Qty: 180 CAPSULE | Refills: 0 | Status: SHIPPED | OUTPATIENT
Start: 2020-06-23 | End: 2020-07-13 | Stop reason: SDUPTHER

## 2020-07-01 ENCOUNTER — OFFICE VISIT (OUTPATIENT)
Dept: SURGERY | Facility: CLINIC | Age: 60
End: 2020-07-01
Payer: MEDICARE

## 2020-07-01 VITALS
DIASTOLIC BLOOD PRESSURE: 70 MMHG | SYSTOLIC BLOOD PRESSURE: 120 MMHG | BODY MASS INDEX: 32.2 KG/M2 | WEIGHT: 164 LBS | HEIGHT: 60 IN

## 2020-07-01 DIAGNOSIS — E66.9 OBESITY (BMI 30-39.9): ICD-10-CM

## 2020-07-01 DIAGNOSIS — I10 ESSENTIAL HYPERTENSION: Primary | ICD-10-CM

## 2020-07-01 DIAGNOSIS — E78.2 MIXED HYPERLIPIDEMIA: ICD-10-CM

## 2020-07-01 DIAGNOSIS — Z51.81 ENCOUNTER FOR THERAPEUTIC DRUG MONITORING: ICD-10-CM

## 2020-07-01 PROCEDURE — 99214 OFFICE O/P EST MOD 30 MIN: CPT | Performed by: INTERNAL MEDICINE

## 2020-07-01 NOTE — PROGRESS NOTES
[address] WEIGHT LOSS CLINIC  [address] [address]  Dept: [phone]         Patient:  Lala Angelucci  :      1960  MRN:      [id number]    Chief Complaint:  Patient presents with:   Follow - Up: Weight 26   • Spondylosis of cervical region without myelopathy or radiculopathy 11/21/2015    MRI [facility] 11-15   • Unspecified sleep apnea     BIPAP   • Visual impairment     glasses        Comorbidities:  SOB/REYES-Improvement?  yes, Back pain-Improvement?  yes, Yoly 180 capsule 1   • DULoxetine HCl 30 MG Oral Cap DR Particles Take 1 capsule (30 mg total) by mouth once daily.  90 capsule 1   • ALPRAZolam 1 MG Oral Tab TAKE 1 TABLET EVERY 4 TO 6 HOURS AS NEEDED FOR ANXIETY AND SLEEP 90 tablet 1   • lamoTRIgine (LAMICTAL) MORNING, ALTERNATING WITH 2 HOURS POST PRANDIAL DAILY 150 strip 0   • Cholecalciferol (VITAMIN D) 1000 UNITS Oral Tab Take 1 capsule by mouth nightly. • Biotin 5000 MCG Oral Cap Take 1 capsule by mouth 2 (two) times daily.        • Atorvastatin Calciu Not on file    Social History Narrative      Not on file    Surgical History:    Past Surgical History:   Procedure Laterality Date   • APPENDECTOMY     • ARTHROSCOPY OF JOINT UNLISTED Bilateral     right shoulder and both knees   •   1994 x2, 19 MD at [address] [facility]   • OTHER      2013, and 2014- deborah joint toes/ had bone grafts with stem cells recreated both toes/       • OTHER  had numerous surgeries    Great toe replacements removed and then reconstructed    • OTHER      reconstr day, Maintain a daily food journal, No drinking 30 minutes before or after meals, Utlize portion control strategies to reduce calorie intake, Identify triggers for eating and manage cues and Eat slowly and take 20 to 30 minutes to complete each meal    Exe types were placed in this encounter. Patient is not interested in bariatric surgery. Patient desires to pursue traditional weight loss at this time. HYPERTENSION: Blood pressure stable on the above medications.  No interval change in antihypertensiv

## 2020-07-13 ENCOUNTER — TELEPHONE (OUTPATIENT)
Dept: CARDIOLOGY | Age: 60
End: 2020-07-13

## 2020-07-13 RX ORDER — TORSEMIDE 20 MG/1
TABLET ORAL
Qty: 180 TABLET | Refills: 0 | Status: SHIPPED | OUTPATIENT
Start: 2020-07-13 | End: 2020-10-27 | Stop reason: DRUGHIGH

## 2020-07-13 RX ORDER — POTASSIUM CHLORIDE 750 MG/1
CAPSULE, EXTENDED RELEASE ORAL
Qty: 120 CAPSULE | Refills: 2 | Status: SHIPPED | OUTPATIENT
Start: 2020-07-13 | End: 2020-10-27 | Stop reason: SDUPTHER

## 2020-07-22 RX ORDER — TORSEMIDE 20 MG/1
TABLET ORAL
Qty: 270 TABLET | Refills: 0 | OUTPATIENT
Start: 2020-07-22

## 2020-07-28 ENCOUNTER — OFFICE VISIT (OUTPATIENT)
Dept: CARDIOLOGY | Age: 60
End: 2020-07-28

## 2020-07-28 VITALS
HEIGHT: 61 IN | SYSTOLIC BLOOD PRESSURE: 130 MMHG | RESPIRATION RATE: 16 BRPM | HEART RATE: 60 BPM | BODY MASS INDEX: 30.58 KG/M2 | WEIGHT: 162 LBS | DIASTOLIC BLOOD PRESSURE: 70 MMHG

## 2020-07-28 DIAGNOSIS — I50.32 CHRONIC DIASTOLIC HEART FAILURE (CMD): ICD-10-CM

## 2020-07-28 DIAGNOSIS — G47.33 OBSTRUCTIVE SLEEP APNEA SYNDROME: Primary | ICD-10-CM

## 2020-07-28 DIAGNOSIS — R07.2 PRECORDIAL PAIN: ICD-10-CM

## 2020-07-28 DIAGNOSIS — E78.00 PURE HYPERCHOLESTEROLEMIA: ICD-10-CM

## 2020-07-28 DIAGNOSIS — E66.9 OBESITY, UNSPECIFIED CLASSIFICATION, UNSPECIFIED OBESITY TYPE, UNSPECIFIED WHETHER SERIOUS COMORBIDITY PRESENT: ICD-10-CM

## 2020-07-28 PROCEDURE — 99214 OFFICE O/P EST MOD 30 MIN: CPT | Performed by: INTERNAL MEDICINE

## 2020-07-28 RX ORDER — DULOXETIN HYDROCHLORIDE 20 MG/1
20 CAPSULE, DELAYED RELEASE ORAL DAILY
COMMUNITY
End: 2022-09-27

## 2020-07-28 SDOH — HEALTH STABILITY: PHYSICAL HEALTH: ON AVERAGE, HOW MANY MINUTES DO YOU ENGAGE IN EXERCISE AT THIS LEVEL?: 40 MIN

## 2020-07-28 SDOH — HEALTH STABILITY: PHYSICAL HEALTH: ON AVERAGE, HOW MANY DAYS PER WEEK DO YOU ENGAGE IN MODERATE TO STRENUOUS EXERCISE (LIKE A BRISK WALK)?: 7 DAYS

## 2020-07-28 SDOH — HEALTH STABILITY: MENTAL HEALTH: HOW OFTEN DO YOU HAVE A DRINK CONTAINING ALCOHOL?: NOT ASKED

## 2020-07-28 ASSESSMENT — ENCOUNTER SYMPTOMS
COUGH: 0
ALLERGIC/IMMUNOLOGIC COMMENTS: NO NEW FOOD ALLERGIES
SUSPICIOUS LESIONS: 0
CHILLS: 0
WEIGHT GAIN: 0
HEMOPTYSIS: 0
FEVER: 0
BRUISES/BLEEDS EASILY: 0
WEIGHT LOSS: 0
HEMATOCHEZIA: 0

## 2020-07-28 ASSESSMENT — PATIENT HEALTH QUESTIONNAIRE - PHQ9
2. FEELING DOWN, DEPRESSED OR HOPELESS: NOT AT ALL
CLINICAL INTERPRETATION OF PHQ9 SCORE: NO FURTHER SCREENING NEEDED
1. LITTLE INTEREST OR PLEASURE IN DOING THINGS: NOT AT ALL
SUM OF ALL RESPONSES TO PHQ9 QUESTIONS 1 AND 2: 0
CLINICAL INTERPRETATION OF PHQ2 SCORE: NO FURTHER SCREENING NEEDED
SUM OF ALL RESPONSES TO PHQ9 QUESTIONS 1 AND 2: 0

## 2020-08-14 ENCOUNTER — TELEPHONE (OUTPATIENT)
Dept: SURGERY | Facility: CLINIC | Age: 60
End: 2020-08-14

## 2020-08-14 DIAGNOSIS — R63.2 BINGE EATING: Primary | ICD-10-CM

## 2020-08-14 NOTE — TELEPHONE ENCOUNTER
Patient would like a call,would like to discuss appointment she had with . Wants to see if she could decrease her vyvanse dosage.

## 2020-08-14 NOTE — TELEPHONE ENCOUNTER
Spoke to patient    States she has been more sweaty and flushed lately. Recommended by PCP to lower dose of Vyvanse due to possible serotonin syndrome. I agree, that dose should be lowered and tested.     Will lower Vyvanse to 50 mg

## 2020-09-08 ENCOUNTER — TELEPHONE (OUTPATIENT)
Dept: SURGERY | Facility: CLINIC | Age: 60
End: 2020-09-08

## 2020-09-11 PROBLEM — R63.4 WEIGHT LOSS: Status: ACTIVE | Noted: 2020-09-11

## 2020-09-11 PROBLEM — Z86.010 HISTORY OF COLON POLYPS: Status: ACTIVE | Noted: 2020-09-11

## 2020-09-11 PROBLEM — Z86.0100 HISTORY OF COLON POLYPS: Status: ACTIVE | Noted: 2020-09-11

## 2020-09-11 PROBLEM — R63.0 ANOREXIA: Status: ACTIVE | Noted: 2020-09-11

## 2020-09-14 DIAGNOSIS — R63.2 BINGE EATING: ICD-10-CM

## 2020-09-15 ENCOUNTER — TELEPHONE (OUTPATIENT)
Dept: SURGERY | Facility: CLINIC | Age: 60
End: 2020-09-15

## 2020-09-15 RX ORDER — LISDEXAMFETAMINE DIMESYLATE 50 MG
CAPSULE ORAL
Qty: 30 CAPSULE | Refills: 0 | Status: SHIPPED | OUTPATIENT
Start: 2020-09-15 | End: 2020-10-07

## 2020-09-24 PROBLEM — R74.8 ELEVATED LIVER ENZYMES: Status: ACTIVE | Noted: 2018-06-07

## 2020-10-07 DIAGNOSIS — R63.2 BINGE EATING: ICD-10-CM

## 2020-10-07 RX ORDER — LISDEXAMFETAMINE DIMESYLATE 50 MG
CAPSULE ORAL
Qty: 30 CAPSULE | Refills: 0 | Status: SHIPPED | OUTPATIENT
Start: 2020-10-07 | End: 2020-10-14

## 2020-10-14 ENCOUNTER — OFFICE VISIT (OUTPATIENT)
Dept: SURGERY | Facility: CLINIC | Age: 60
End: 2020-10-14
Payer: MEDICARE

## 2020-10-14 VITALS
BODY MASS INDEX: 28.66 KG/M2 | HEIGHT: 60 IN | DIASTOLIC BLOOD PRESSURE: 70 MMHG | SYSTOLIC BLOOD PRESSURE: 112 MMHG | WEIGHT: 146 LBS

## 2020-10-14 DIAGNOSIS — E78.2 MIXED HYPERLIPIDEMIA: ICD-10-CM

## 2020-10-14 DIAGNOSIS — E66.9 OBESITY (BMI 30-39.9): ICD-10-CM

## 2020-10-14 DIAGNOSIS — I10 ESSENTIAL HYPERTENSION: Primary | ICD-10-CM

## 2020-10-14 DIAGNOSIS — Z51.81 ENCOUNTER FOR THERAPEUTIC DRUG MONITORING: ICD-10-CM

## 2020-10-14 DIAGNOSIS — R63.2 BINGE EATING: ICD-10-CM

## 2020-10-14 PROCEDURE — 99214 OFFICE O/P EST MOD 30 MIN: CPT | Performed by: INTERNAL MEDICINE

## 2020-10-14 RX ORDER — LINACLOTIDE 145 UG/1
1 CAPSULE, GELATIN COATED ORAL DAILY
Qty: 90 CAPSULE | Refills: 0 | Status: SHIPPED | OUTPATIENT
Start: 2020-10-14 | End: 2021-02-02

## 2020-10-14 NOTE — PROGRESS NOTES
[address] WEIGHT LOSS CLINIC  [address] [address]  Dept: [phone]         Patient:  Marta Mistry  :      1960  MRN:      [id number]    Chief Complaint:  Patient presents with:   Follow - Up: Weight 26   • Spondylosis of cervical region without myelopathy or radiculopathy 11/21/2015    MRI [facility] 11-15   • Unspecified sleep apnea     BIPAP   • Visual impairment     glasses        Comorbidities:  SOB/REYES-Improvement?  yes, Back pain-Improvement?  yes, Yoly (LAMICTAL) 100 MG Oral Tab Take 1 tablet (100 mg total) by mouth nightly.  90 tablet 1   • Prochlorperazine Maleate (COMPAZINE) 10 mg tablet TAKE ONE TABLET BY MOUTH EVERY SIX HOURS AS NEEDED 40 tablet 0   • gabapentin 300 MG Oral Cap Take 1 capsule (300 mg Oral Cap Take 1 capsule by mouth 2 (two) times daily. • Atorvastatin Calcium (LIPITOR) 20 MG Oral Tab Take 20 mg by mouth daily.        Allergies:  Latex, Tequin [Gatifloxacin], Adhesive Tape, and Imitrex [Sumatriptan]     Social History:    Social Hi JOINT UNLISTED Bilateral     right shoulder and both knees   •   1994 x2, 1992 x 1    total of 3   • CHOLECYSTECTOMY     • CLOSED REDUCTION WITH PERCUTANEOUS PINNING FINGER Left 12/3/2019    Performed by Domenica Caro MD at [facility], surgeries    Great toe replacements removed and then reconstructed    • OTHER      reconstructive surgery on left foot with hardware then it was removed   • OTHER Right     tkr   • OTHER  12/2018    Revision 5th metatarsal fusion   • OTHER SURGICAL HISTORY eating and manage cues and Eat slowly and take 20 to 30 minutes to complete each meal    Exercise Goals Reviewed and Discussed    Walk for  45 Minutes and Chair exercises for  45 Minutes    ROS:    Constitutional: positive for fatigue  Respiratory: positiv HYPERTENSION: Blood pressure stable on the above medications. No interval change in antihypertensive medication. Patient was instructed to continue wearing their CPaP as recommended. Constipation: improving with meds.      Goals for next month:

## 2020-10-27 ENCOUNTER — TELEPHONE (OUTPATIENT)
Dept: CARDIOLOGY | Age: 60
End: 2020-10-27

## 2020-10-27 DIAGNOSIS — I50.32 CHRONIC DIASTOLIC HEART FAILURE (CMD): Primary | ICD-10-CM

## 2020-10-27 DIAGNOSIS — G47.33 OBSTRUCTIVE SLEEP APNEA SYNDROME: ICD-10-CM

## 2020-10-27 RX ORDER — POTASSIUM CHLORIDE 750 MG/1
CAPSULE, EXTENDED RELEASE ORAL
Qty: 360 CAPSULE | Refills: 0 | Status: SHIPPED | OUTPATIENT
Start: 2020-10-27 | End: 2020-12-11

## 2020-10-27 RX ORDER — TORSEMIDE 20 MG/1
TABLET ORAL
Qty: 180 TABLET | Refills: 0 | Status: SHIPPED | OUTPATIENT
Start: 2020-10-27 | End: 2021-01-25

## 2020-10-27 RX ORDER — TORSEMIDE 20 MG/1
20 TABLET ORAL DAILY
COMMUNITY
End: 2020-10-27 | Stop reason: SDUPTHER

## 2020-10-29 PROBLEM — R07.2 PRECORDIAL PAIN: Status: ACTIVE | Noted: 2020-07-28

## 2020-10-29 PROBLEM — R07.2 PRECORDIAL PAIN: Status: RESOLVED | Noted: 2020-07-28 | Resolved: 2020-10-29

## 2020-10-29 PROBLEM — E21.3 HYPERPARATHYROIDISM (HCC): Status: RESOLVED | Noted: 2019-08-13 | Resolved: 2020-10-29

## 2020-11-02 RX ORDER — TORSEMIDE 20 MG/1
TABLET ORAL
Qty: 180 TABLET | Refills: 0 | OUTPATIENT
Start: 2020-11-02

## 2020-11-27 ENCOUNTER — APPOINTMENT (OUTPATIENT)
Dept: CARDIOLOGY | Age: 60
End: 2020-11-27
Attending: INTERNAL MEDICINE

## 2020-12-09 ENCOUNTER — HOSPITAL ENCOUNTER (OUTPATIENT)
Dept: CARDIOLOGY | Age: 60
Discharge: HOME | End: 2020-12-09
Attending: INTERNAL MEDICINE

## 2020-12-09 DIAGNOSIS — I50.32 CHRONIC DIASTOLIC HEART FAILURE (CMD): ICD-10-CM

## 2020-12-09 DIAGNOSIS — E66.9 OBESITY, UNSPECIFIED CLASSIFICATION, UNSPECIFIED OBESITY TYPE, UNSPECIFIED WHETHER SERIOUS COMORBIDITY PRESENT: ICD-10-CM

## 2020-12-09 DIAGNOSIS — E78.00 PURE HYPERCHOLESTEROLEMIA: ICD-10-CM

## 2020-12-09 DIAGNOSIS — R07.2 PRECORDIAL PAIN: ICD-10-CM

## 2020-12-09 PROCEDURE — 93351 STRESS TTE COMPLETE: CPT

## 2020-12-09 PROCEDURE — 93351 STRESS TTE COMPLETE: CPT | Performed by: INTERNAL MEDICINE

## 2020-12-11 ENCOUNTER — TELEPHONE (OUTPATIENT)
Dept: CARDIOLOGY | Age: 60
End: 2020-12-11

## 2020-12-11 RX ORDER — POTASSIUM CHLORIDE 750 MG/1
CAPSULE, EXTENDED RELEASE ORAL
Qty: 120 CAPSULE | Refills: 2 | Status: SHIPPED | OUTPATIENT
Start: 2020-12-11 | End: 2021-02-22 | Stop reason: SDUPTHER

## 2021-01-08 ENCOUNTER — TELEMEDICINE (OUTPATIENT)
Dept: SURGERY | Facility: CLINIC | Age: 61
End: 2021-01-08
Payer: MEDICARE

## 2021-01-08 VITALS — WEIGHT: 142 LBS | HEIGHT: 61 IN | BODY MASS INDEX: 26.81 KG/M2

## 2021-01-08 DIAGNOSIS — I10 ESSENTIAL HYPERTENSION: Primary | ICD-10-CM

## 2021-01-08 DIAGNOSIS — R63.2 BINGE EATING: ICD-10-CM

## 2021-01-08 DIAGNOSIS — Z51.81 ENCOUNTER FOR THERAPEUTIC DRUG MONITORING: ICD-10-CM

## 2021-01-08 DIAGNOSIS — E66.9 OBESITY (BMI 30-39.9): ICD-10-CM

## 2021-01-08 DIAGNOSIS — E78.2 MIXED HYPERLIPIDEMIA: ICD-10-CM

## 2021-01-08 PROCEDURE — 99443 PHONE E/M BY PHYS 21-30 MIN: CPT | Performed by: INTERNAL MEDICINE

## 2021-01-08 NOTE — PROGRESS NOTES
[facility] AND WEIGHT LOSS CLINIC  [address] [address]  Dept: [phone]     Virtual Telephone Check-In    Elis Megan verbally consents to a Virtual/Telephone Check-In visit on 01/08/21.   Patient has been refe • Hypertriglyceridemia    • Impaired glucose tolerance 5/7/2015   • Migraines    • Morbid obesity with BMI of 45.0-49.9, adult (HCC)    • Miller's neuroma    • Neuropathy     toes and left foot   • BRISA on CPAP    • Osteoarthritis     generalized   • Panc every 4 to 6 hours as needed for Sleep or Anxiety. 90 tablet 1   • DULoxetine HCl 60 MG Oral Cap DR Particles TAKE 2 CAPSULES BY MOUTH EVERY  capsule 1   • DULoxetine HCl 30 MG Oral Cap DR Particles Take 1 capsule (30 mg total) by mouth once daily. Take by mouth. • Omeprazole 40 MG Oral Capsule Delayed Release Take 1 capsule (40 mg total) by mouth daily. 90 capsule 3   • Clobetasol Propionate 0.05 % External Cream Apply topically.  Apply to scalp once a week      • Lidocaine Viscous 2 % Mouth/Thro Relationships      Social connections        Talks on phone: Not on file        Gets together: Not on file        Attends Jain service: Not on file        Active member of club or organization: Not on file        Attends meetings of clubs or organizat 5/19/2015    Performed by Otis Ahn MD at [facility]   • KNEE TOTAL REPLACEMENT Left 12/6/2016    Performed by Otis Ahn MD at [facility] MAIN OR   • [facility] Right 9/6/2016    Performed by Otis Ahn MD at [facility] MAIN OR   • [address] consume a meal:  20  · # of snacks per day: 1 Type of snacks: black berries  · Amount of soda consumption per day:  0  · Amount of water (in ounces) per day:  64  · Drinking between meals only:  yes  · Toughest challenge:  Exercise due to pain    Nutrition hypertension  (primary encounter diagnosis)  Binge eating  Mixed hyperlipidemia  Encounter for therapeutic drug monitoring  Obesity (bmi 30-39. 9)    PLAN   No orders of the defined types were placed in this encounter.     Patient is not interested in bariat

## 2021-01-24 ENCOUNTER — TELEPHONE (OUTPATIENT)
Dept: CARDIOLOGY | Age: 61
End: 2021-01-24

## 2021-01-25 RX ORDER — TORSEMIDE 20 MG/1
TABLET ORAL
Qty: 180 TABLET | Refills: 2 | Status: SHIPPED | OUTPATIENT
Start: 2021-01-25 | End: 2021-07-29 | Stop reason: SDUPTHER

## 2021-02-02 RX ORDER — LINACLOTIDE 145 UG/1
CAPSULE, GELATIN COATED ORAL
Qty: 90 CAPSULE | Refills: 0 | Status: SHIPPED | OUTPATIENT
Start: 2021-02-02 | End: 2021-05-06

## 2021-02-04 ENCOUNTER — IMAGING SERVICES (OUTPATIENT)
Dept: OTHER | Age: 61
End: 2021-02-04
Attending: OBSTETRICS & GYNECOLOGY

## 2021-02-22 ENCOUNTER — TELEPHONE (OUTPATIENT)
Dept: CARDIOLOGY | Age: 61
End: 2021-02-22

## 2021-02-22 RX ORDER — POTASSIUM CHLORIDE 750 MG/1
CAPSULE, EXTENDED RELEASE ORAL
Qty: 360 CAPSULE | Refills: 0 | OUTPATIENT
Start: 2021-02-22

## 2021-02-22 RX ORDER — POTASSIUM CHLORIDE 750 MG/1
CAPSULE, EXTENDED RELEASE ORAL
Qty: 120 CAPSULE | Refills: 5 | Status: SHIPPED | OUTPATIENT
Start: 2021-02-22 | End: 2021-07-29 | Stop reason: SDUPTHER

## 2021-04-09 ENCOUNTER — OFFICE VISIT (OUTPATIENT)
Dept: SURGERY | Facility: CLINIC | Age: 61
End: 2021-04-09
Payer: MEDICARE

## 2021-04-09 VITALS
HEIGHT: 60 IN | SYSTOLIC BLOOD PRESSURE: 140 MMHG | BODY MASS INDEX: 28.86 KG/M2 | DIASTOLIC BLOOD PRESSURE: 70 MMHG | WEIGHT: 147 LBS

## 2021-04-09 DIAGNOSIS — I10 ESSENTIAL HYPERTENSION: Primary | ICD-10-CM

## 2021-04-09 DIAGNOSIS — E66.9 OBESITY (BMI 30-39.9): ICD-10-CM

## 2021-04-09 DIAGNOSIS — R63.2 BINGE EATING: ICD-10-CM

## 2021-04-09 DIAGNOSIS — Z51.81 ENCOUNTER FOR THERAPEUTIC DRUG MONITORING: ICD-10-CM

## 2021-04-09 PROCEDURE — 99214 OFFICE O/P EST MOD 30 MIN: CPT | Performed by: INTERNAL MEDICINE

## 2021-04-09 NOTE — PROGRESS NOTES
[address] WEIGHT LOSS CLINIC  [address] [place] [address]  Dept: [phone]         Patient:  Ángela Coleman  :      1960  MRN:      [id number]    Chief Complaint:  Patient presents with:   Follow - Up: Weight 26   • Spondylosis of cervical region without myelopathy or radiculopathy 11/21/2015    MRI [facility] 11-15   • Unspecified sleep apnea     BIPAP   • Visual impairment     glasses        Comorbidities:  SOB/REYES-Improvement?  yes, Back pain-Improvement?  yes, Yoly 0   • Clobetasol Propionate 0.05 % External Solution Apply to affected areas of scalp, ears daily as needed 50 mL 2   • Tretinoin 0.05 % External Cream Apply pea size amount to entire face nightly, as directed.  45 g 3   • VALACYCLOVIR  MG Oral Tab T capsule 3   • Clobetasol Propionate 0.05 % External Cream Apply topically.  Apply to scalp once a week      • Lidocaine Viscous 2 % Mouth/Throat Solution SWISH AND SWALLOW OR SPIT 5 TO 15 ML PO Q 6 TO 8 H PRF MOUTH PAIN 100 mL 2   • Lidocaine HCl 3 % Extern Activity:       Days of Exercise per Week:       Minutes of Exercise per Session:   Stress:       Feeling of Stress :   Social Connections:       Frequency of Communication with Friends and Family:       Frequency of Social Gatherings with Friends and Fami 6/23/2015    Performed by Cheryl Maldonado MD at [address] [place] [address] Dr   • KNEE ARTHROSCOPY Left 5/19/2015    Performed by Cheryl Maldonado MD at [address] [place] [address] Dr   • [facility] Left 12/6/2016    Performed by Cheryl Maldonado MD at [facility] OR   • KNEE TOTAL Intake:     · Average CHO Intake: <100  · Is patient exercising?  yes  · Type of exercise? adl's    Eating Habits  · Patient states the following:  · Eats 3 meal(s) per day  · Length of time it takes to consume a meal:  20  · # of snacks per day: 1 Type of folds      ASSESSMENT     HYPERTENSION:  The patient's blood pressure has been well controlled. she has been checking it as instructed and has remained in relatively good control.     OBSTRUCTIVE SLEEP APNEA: The patient states her sleep apnea has been sta

## 2021-04-15 ENCOUNTER — TELEPHONE (OUTPATIENT)
Dept: SURGERY | Facility: CLINIC | Age: 61
End: 2021-04-15

## 2021-04-15 NOTE — TELEPHONE ENCOUNTER
Left message    Hold vyvanse this weekend and call back Monday morning. Will possibly lower dose or change medication.

## 2021-04-15 NOTE — TELEPHONE ENCOUNTER
Please call patient stating 40mg vyvanse is causing her to feel anxious,tachycardia. Would like to see if it could possibly be lowered

## 2021-04-19 PROBLEM — I50.30 ACC/AHA STAGE B DIASTOLIC HEART FAILURE (HCC): Status: RESOLVED | Noted: 2019-11-25 | Resolved: 2021-04-19

## 2021-04-21 ENCOUNTER — TELEPHONE (OUTPATIENT)
Dept: SURGERY | Facility: CLINIC | Age: 61
End: 2021-04-21

## 2021-04-21 DIAGNOSIS — R63.2 BINGE EATING: Primary | ICD-10-CM

## 2021-04-28 ENCOUNTER — LAB REQUISITION (OUTPATIENT)
Dept: LAB | Age: 61
End: 2021-04-28

## 2021-04-28 DIAGNOSIS — Z12.4 ENCOUNTER FOR SCREENING FOR MALIGNANT NEOPLASM OF CERVIX: ICD-10-CM

## 2021-04-28 PROCEDURE — 87624 HPV HI-RISK TYP POOLED RSLT: CPT | Performed by: CLINICAL MEDICAL LABORATORY

## 2021-04-28 PROCEDURE — 88175 CYTOPATH C/V AUTO FLUID REDO: CPT | Performed by: CLINICAL MEDICAL LABORATORY

## 2021-05-05 LAB
CASE RPRT: NORMAL
CYTOLOGY CVX/VAG STUDY: NORMAL
HPV16+18+45 E6+E7MRNA CVX NAA+PROBE: NEGATIVE
Lab: NORMAL
PAP EDUCATIONAL NOTE: NORMAL
SPECIMEN ADEQUACY: NORMAL

## 2021-05-06 RX ORDER — LINACLOTIDE 145 UG/1
CAPSULE, GELATIN COATED ORAL
Qty: 90 CAPSULE | Refills: 0 | Status: SHIPPED | OUTPATIENT
Start: 2021-05-06 | End: 2021-06-02

## 2021-05-30 DIAGNOSIS — R63.2 BINGE EATING: ICD-10-CM

## 2021-06-01 RX ORDER — LISDEXAMFETAMINE DIMESYLATE 20 MG/1
CAPSULE ORAL
Qty: 30 CAPSULE | Refills: 0 | Status: SHIPPED | OUTPATIENT
Start: 2021-06-01 | End: 2021-06-29

## 2021-06-02 RX ORDER — LINACLOTIDE 145 UG/1
1 CAPSULE, GELATIN COATED ORAL DAILY
Qty: 90 CAPSULE | Refills: 0 | Status: SHIPPED | OUTPATIENT
Start: 2021-06-02 | End: 2021-08-16

## 2021-06-17 PROBLEM — M79.645 THUMB PAIN, LEFT: Status: ACTIVE | Noted: 2021-06-17

## 2021-07-05 ENCOUNTER — APPOINTMENT (OUTPATIENT)
Dept: ULTRASOUND IMAGING | Age: 61
End: 2021-07-05

## 2021-07-05 ENCOUNTER — HOSPITAL ENCOUNTER (EMERGENCY)
Age: 61
Discharge: HOME | End: 2021-07-05
Attending: EMERGENCY MEDICINE

## 2021-07-05 VITALS
DIASTOLIC BLOOD PRESSURE: 67 MMHG | SYSTOLIC BLOOD PRESSURE: 110 MMHG | OXYGEN SATURATION: 98 % | HEART RATE: 75 BPM | TEMPERATURE: 98.1 F | HEIGHT: 64 IN | RESPIRATION RATE: 16 BRPM | BODY MASS INDEX: 24.46 KG/M2 | WEIGHT: 143.3 LBS

## 2021-07-05 DIAGNOSIS — M71.21 SYNOVIAL CYST OF RIGHT POPLITEAL SPACE: ICD-10-CM

## 2021-07-05 DIAGNOSIS — M79.661 RIGHT CALF PAIN: Primary | ICD-10-CM

## 2021-07-05 DIAGNOSIS — Z96.651 TOTAL KNEE REPLACEMENT STATUS, RIGHT: ICD-10-CM

## 2021-07-05 PROCEDURE — 93971 EXTREMITY STUDY: CPT

## 2021-07-05 PROCEDURE — 10002803 HB RX 637: Performed by: PHYSICIAN ASSISTANT

## 2021-07-05 PROCEDURE — 99283 EMERGENCY DEPT VISIT LOW MDM: CPT

## 2021-07-05 RX ORDER — IBUPROFEN 600 MG/1
600 TABLET ORAL ONCE
Status: COMPLETED | OUTPATIENT
Start: 2021-07-05 | End: 2021-07-05

## 2021-07-05 RX ADMIN — IBUPROFEN 600 MG: 600 TABLET ORAL at 20:15

## 2021-07-05 ASSESSMENT — ENCOUNTER SYMPTOMS
VOMITING: 0
NUMBNESS: 0
FEVER: 0
CHILLS: 0
SHORTNESS OF BREATH: 0
DIZZINESS: 0
COUGH: 0

## 2021-07-06 DIAGNOSIS — R63.2 BINGE EATING: ICD-10-CM

## 2021-07-07 ENCOUNTER — TELEPHONE (OUTPATIENT)
Dept: SURGERY | Facility: CLINIC | Age: 61
End: 2021-07-07

## 2021-07-08 RX ORDER — LISDEXAMFETAMINE DIMESYLATE 40 MG/1
CAPSULE ORAL
Qty: 30 CAPSULE | Refills: 0 | Status: SHIPPED | OUTPATIENT
Start: 2021-07-08 | End: 2021-09-07

## 2021-07-09 ENCOUNTER — OFFICE VISIT (OUTPATIENT)
Dept: SURGERY | Facility: CLINIC | Age: 61
End: 2021-07-09
Payer: MEDICARE

## 2021-07-09 VITALS
HEIGHT: 60 IN | OXYGEN SATURATION: 100 % | DIASTOLIC BLOOD PRESSURE: 77 MMHG | BODY MASS INDEX: 28.9 KG/M2 | SYSTOLIC BLOOD PRESSURE: 123 MMHG | HEART RATE: 80 BPM | WEIGHT: 147.19 LBS

## 2021-07-09 DIAGNOSIS — E65 ABDOMINAL PANNUS: ICD-10-CM

## 2021-07-09 DIAGNOSIS — R63.2 BINGE EATING: Primary | ICD-10-CM

## 2021-07-09 DIAGNOSIS — E78.2 MIXED HYPERLIPIDEMIA: ICD-10-CM

## 2021-07-09 DIAGNOSIS — Z51.81 ENCOUNTER FOR THERAPEUTIC DRUG MONITORING: ICD-10-CM

## 2021-07-09 DIAGNOSIS — E66.3 OVERWEIGHT (BMI 25.0-29.9): ICD-10-CM

## 2021-07-09 DIAGNOSIS — I10 ESSENTIAL HYPERTENSION: ICD-10-CM

## 2021-07-09 PROCEDURE — 99442 PHONE E/M BY PHYS 11-20 MIN: CPT | Performed by: INTERNAL MEDICINE

## 2021-07-09 NOTE — PROGRESS NOTES
300 [facility]  [address] [place] [id number]  Dept: [phone]     Virtual Telephone Check-In    Dukeunruly Siegel verbally consents to a Virtual/Telephone Check-In visit on 07/09/21.   Patient has been refe • Impaired glucose tolerance 5/7/2015   • Migraines    • Morbid obesity with BMI of 45.0-49.9, adult (HCC)    • Miller's neuroma    • Neuropathy     toes and left foot   • BRIAS on CPAP    • Osteoarthritis     generalized   • Pancreatic divisum 2/24/2015 hours as needed 40 tablet 3   • CYCLOBENZAPRINE 10 MG Oral Tab Take 1 tablet (10 mg total) by mouth 3 (three) times daily as needed for Muscle spasms. 60 tablet 1   • linaCLOtide (LINZESS) 145 MCG Oral Cap Take 1 capsule by mouth daily.  90 capsule 0   • mo tablet 0   • Aspirin Buf,CaCarb-MgCarb-MgO, 81 MG Oral Tab Take 81 mg by mouth daily.      • SILVER SULFADIAZINE 1 % External Cream Apply to directed area once daily 60 g 1   • Polyethylene Glycol 3350 (SM CLEARLAX) Oral Powder MIX 17 GRAMS IN LIQUID AND  History Narrative      Not on file    Social Determinants of Health  Financial Resource Strain:       Difficulty of Paying Living Expenses:   Food Insecurity:       Worried About [employer] [employer] in the Last Year:       Ran Out of Food in the Last Year: cuff    • OTHER SURGICAL HISTORY  1/2017    L shoulder replacement   • OTHER SURGICAL HISTORY      R foot surgery x4   • TONSILLECTOMY       Family History:    Family History   Problem Relation Age of Onset   • Diabetes Father    • Cancer Father         pr due to tele visit  Awake and alert  Speaking full sentences      ASSESSMENT     HYPERTENSION:  The patient's blood pressure has been well controlled. she has been checking it as instructed and has remained in relatively good control.     OBSTRUCTIVE SLEEP

## 2021-07-23 ENCOUNTER — LAB REQUISITION (OUTPATIENT)
Dept: LAB | Age: 61
End: 2021-07-23

## 2021-07-23 DIAGNOSIS — N95.0 POSTMENOPAUSAL BLEEDING: ICD-10-CM

## 2021-07-23 PROCEDURE — 88305 TISSUE EXAM BY PATHOLOGIST: CPT | Performed by: CLINICAL MEDICAL LABORATORY

## 2021-07-24 ENCOUNTER — LAB REQUISITION (OUTPATIENT)
Dept: LAB | Age: 61
End: 2021-07-24

## 2021-07-24 DIAGNOSIS — N95.0 POSTMENOPAUSAL BLEEDING: ICD-10-CM

## 2021-07-26 ASSESSMENT — ENCOUNTER SYMPTOMS
ALLERGIC/IMMUNOLOGIC COMMENTS: NO NEW FOOD ALLERGIES
WEIGHT LOSS: 0
COUGH: 0
CHILLS: 0
BRUISES/BLEEDS EASILY: 0
HEMATOCHEZIA: 0
WEIGHT GAIN: 0
FEVER: 0
SUSPICIOUS LESIONS: 0
HEMOPTYSIS: 0

## 2021-07-27 RX ORDER — AMOXICILLIN 500 MG/1
2000 CAPSULE ORAL SEE ADMIN INSTRUCTIONS
COMMUNITY
Start: 2021-06-10

## 2021-07-27 RX ORDER — PHENOL 1.4 %
10 AEROSOL, SPRAY (ML) MUCOUS MEMBRANE DAILY
COMMUNITY
Start: 2021-06-29 | End: 2022-09-27

## 2021-07-27 RX ORDER — WHEAT DEXTRIN 3 G/3.8 G
POWDER (GRAM) ORAL
COMMUNITY

## 2021-07-27 RX ORDER — HYDROCODONE BITARTRATE AND ACETAMINOPHEN 5; 325 MG/1; MG/1
TABLET ORAL EVERY 6 HOURS PRN
COMMUNITY
Start: 2021-05-03 | End: 2021-07-29

## 2021-07-27 RX ORDER — TRAZODONE HYDROCHLORIDE 50 MG/1
50 TABLET ORAL NIGHTLY PRN
COMMUNITY
Start: 2021-06-29

## 2021-07-29 ENCOUNTER — OFFICE VISIT (OUTPATIENT)
Dept: CARDIOLOGY | Age: 61
End: 2021-07-29

## 2021-07-29 VITALS
BODY MASS INDEX: 28.32 KG/M2 | HEIGHT: 61 IN | DIASTOLIC BLOOD PRESSURE: 70 MMHG | WEIGHT: 150 LBS | HEART RATE: 68 BPM | SYSTOLIC BLOOD PRESSURE: 126 MMHG

## 2021-07-29 DIAGNOSIS — I50.32 CHRONIC DIASTOLIC HEART FAILURE (CMD): Primary | ICD-10-CM

## 2021-07-29 DIAGNOSIS — G47.33 OBSTRUCTIVE SLEEP APNEA SYNDROME: ICD-10-CM

## 2021-07-29 DIAGNOSIS — E78.00 PURE HYPERCHOLESTEROLEMIA: ICD-10-CM

## 2021-07-29 DIAGNOSIS — M35.2 BEHCET'S DISEASE (CMD): ICD-10-CM

## 2021-07-29 LAB
ASR DISCLAIMER: NORMAL
CASE RPRT: NORMAL
CLINICAL INFO: NORMAL
PATH REPORT.FINAL DX SPEC: NORMAL
PATH REPORT.GROSS SPEC: NORMAL

## 2021-07-29 PROCEDURE — 99214 OFFICE O/P EST MOD 30 MIN: CPT | Performed by: INTERNAL MEDICINE

## 2021-07-29 RX ORDER — POTASSIUM CHLORIDE 750 MG/1
10 CAPSULE, EXTENDED RELEASE ORAL 2 TIMES DAILY
Qty: 180 CAPSULE | Refills: 1 | Status: CANCELLED | OUTPATIENT
Start: 2021-07-29

## 2021-07-29 RX ORDER — TORSEMIDE 20 MG/1
20 TABLET ORAL DAILY
Qty: 90 TABLET | Refills: 1 | Status: SHIPPED | OUTPATIENT
Start: 2021-07-29 | End: 2022-09-27

## 2021-07-29 RX ORDER — POTASSIUM CHLORIDE 750 MG/1
10 CAPSULE, EXTENDED RELEASE ORAL 2 TIMES DAILY
Qty: 90 CAPSULE | Refills: 1 | Status: SHIPPED | OUTPATIENT
Start: 2021-07-29 | End: 2021-10-22

## 2021-07-29 SDOH — HEALTH STABILITY: PHYSICAL HEALTH: ON AVERAGE, HOW MANY DAYS PER WEEK DO YOU ENGAGE IN MODERATE TO STRENUOUS EXERCISE (LIKE A BRISK WALK)?: 7 DAYS

## 2021-07-29 ASSESSMENT — PATIENT HEALTH QUESTIONNAIRE - PHQ9
CLINICAL INTERPRETATION OF PHQ9 SCORE: NO FURTHER SCREENING NEEDED
1. LITTLE INTEREST OR PLEASURE IN DOING THINGS: NOT AT ALL
2. FEELING DOWN, DEPRESSED OR HOPELESS: NOT AT ALL
SUM OF ALL RESPONSES TO PHQ9 QUESTIONS 1 AND 2: 0
SUM OF ALL RESPONSES TO PHQ9 QUESTIONS 1 AND 2: 0
CLINICAL INTERPRETATION OF PHQ2 SCORE: NO FURTHER SCREENING NEEDED

## 2021-08-16 RX ORDER — LINACLOTIDE 145 UG/1
CAPSULE, GELATIN COATED ORAL
Qty: 90 CAPSULE | Refills: 0 | Status: SHIPPED | OUTPATIENT
Start: 2021-08-16 | End: 2021-11-26

## 2021-09-07 DIAGNOSIS — R63.2 BINGE EATING: ICD-10-CM

## 2021-10-22 RX ORDER — POTASSIUM CHLORIDE 750 MG/1
CAPSULE, EXTENDED RELEASE ORAL
Qty: 90 CAPSULE | Refills: 3 | Status: SHIPPED | OUTPATIENT
Start: 2021-10-22 | End: 2022-09-27

## 2021-11-10 ENCOUNTER — TELEPHONE (OUTPATIENT)
Dept: SURGERY | Facility: CLINIC | Age: 61
End: 2021-11-10

## 2021-11-10 DIAGNOSIS — R63.2 BINGE EATING: ICD-10-CM

## 2021-11-10 NOTE — TELEPHONE ENCOUNTER
Called patient and left message to call at her convenience to schedule apt with Dr. Kandi Morse. Patient left voicemail requesting refill on Vyvanse 40mg be sent to [facility] in [place]. Patient's last appt was on 7/9/21 via Video visit.

## 2021-11-16 RX ORDER — LINACLOTIDE 145 UG/1
CAPSULE, GELATIN COATED ORAL
Qty: 90 CAPSULE | Refills: 0 | OUTPATIENT
Start: 2021-11-16

## 2021-11-26 RX ORDER — LINACLOTIDE 145 UG/1
CAPSULE, GELATIN COATED ORAL
Qty: 90 CAPSULE | Refills: 0 | Status: SHIPPED | OUTPATIENT
Start: 2021-11-26

## 2021-12-29 ENCOUNTER — OFFICE VISIT (OUTPATIENT)
Dept: SURGERY | Facility: CLINIC | Age: 61
End: 2021-12-29
Payer: MEDICARE

## 2021-12-29 VITALS
WEIGHT: 145.13 LBS | HEART RATE: 78 BPM | BODY MASS INDEX: 28.49 KG/M2 | HEIGHT: 60 IN | DIASTOLIC BLOOD PRESSURE: 67 MMHG | SYSTOLIC BLOOD PRESSURE: 104 MMHG | OXYGEN SATURATION: 99 %

## 2021-12-29 DIAGNOSIS — E66.3 OVERWEIGHT (BMI 25.0-29.9): ICD-10-CM

## 2021-12-29 DIAGNOSIS — Z51.81 ENCOUNTER FOR THERAPEUTIC DRUG MONITORING: ICD-10-CM

## 2021-12-29 DIAGNOSIS — I10 ESSENTIAL HYPERTENSION: Primary | ICD-10-CM

## 2021-12-29 DIAGNOSIS — R63.2 BINGE EATING: ICD-10-CM

## 2021-12-29 PROCEDURE — 99214 OFFICE O/P EST MOD 30 MIN: CPT | Performed by: INTERNAL MEDICINE

## 2021-12-29 NOTE — PROGRESS NOTES
[address] [facility]  [address] [place] [id number]  Dept: [phone]             Patient:  Salo Modi  :      1960  MRN:      [id number]    Chief Complaint:  Patient presents with:   Follow - Up: non olefactory seizures history not since age 32   • Spondylosis of cervical region without myelopathy or radiculopathy 11/21/2015    MRI [facility] 11-15   • Unspecified sleep apnea     BIPAP   • Visual impairment     glasses        Comorbidities:  SOB/REYES-Improv DAILY 90 capsule 0   • morphINE ER 30 MG Oral Tab CR Take 2 tablets (60 mg total) by mouth Q12H. 120 tablet 0   • oxyCODONE-acetaminophen  MG Oral Tab Take 1-2 tablets every 4 hours as needed for pain 180 tablet 0   • OMEPRAZOLE 40 MG Oral Capsule Oren Darnell Tab Take 1 capsule by mouth nightly. • Biotin 5000 MCG Oral Cap Take 1 capsule by mouth 2 (two) times daily.          Allergies:  Latex, Tequin [Gatifloxacin], Adhesive Tape, and Imitrex [Sumatriptan]     Social History:    Social History    Socioecon replacements removed and then reconstructed    • OTHER      reconstructive surgery on left foot with hardware then it was removed   • OTHER Right     tkr   • OTHER  12/2018    Revision 5th metatarsal fusion   • OTHER SURGICAL HISTORY      BILATERAL TOE mary Constitutional: positive for fatigue  Respiratory: positive for dyspnea on exertion  Cardiovascular: negative  Gastrointestinal: positive for constipation  Musculoskeletal:positive for arthralgias and back pain  Neurological: negative  Behavioral/Psych: weight over the past six months  Skin over abdomen is causing issues with exercise and ADL's  Breast tissue is also causing back pain and irritation under skin folds  paniculus hanging below pubic symphisis    Refer to plastic surgery team      Has support

## 2022-02-24 RX ORDER — LINACLOTIDE 145 UG/1
CAPSULE, GELATIN COATED ORAL
Qty: 90 CAPSULE | Refills: 0 | Status: SHIPPED | OUTPATIENT
Start: 2022-02-24 | End: 2022-03-03

## 2022-03-09 ENCOUNTER — TELEPHONE (OUTPATIENT)
Dept: SURGERY | Facility: CLINIC | Age: 62
End: 2022-03-09

## 2022-03-21 ENCOUNTER — OFFICE VISIT (OUTPATIENT)
Dept: SURGERY | Facility: CLINIC | Age: 62
End: 2022-03-21
Payer: MEDICARE

## 2022-03-21 VITALS
SYSTOLIC BLOOD PRESSURE: 135 MMHG | DIASTOLIC BLOOD PRESSURE: 81 MMHG | HEIGHT: 60 IN | HEART RATE: 90 BPM | OXYGEN SATURATION: 99 % | BODY MASS INDEX: 27.06 KG/M2 | WEIGHT: 137.81 LBS

## 2022-03-21 DIAGNOSIS — Z51.81 ENCOUNTER FOR THERAPEUTIC DRUG MONITORING: ICD-10-CM

## 2022-03-21 DIAGNOSIS — I10 ESSENTIAL HYPERTENSION: Primary | ICD-10-CM

## 2022-03-21 DIAGNOSIS — R63.2 BINGE EATING: ICD-10-CM

## 2022-03-21 DIAGNOSIS — E66.3 OVERWEIGHT (BMI 25.0-29.9): ICD-10-CM

## 2022-03-21 PROCEDURE — 99214 OFFICE O/P EST MOD 30 MIN: CPT | Performed by: INTERNAL MEDICINE

## 2022-03-21 RX ORDER — LINACLOTIDE 72 UG/1
1 CAPSULE, GELATIN COATED ORAL DAILY
Qty: 30 CAPSULE | Refills: 2 | Status: SHIPPED | OUTPATIENT
Start: 2022-03-21 | End: 2022-04-20

## 2022-04-11 ENCOUNTER — TELEPHONE (OUTPATIENT)
Dept: SURGERY | Facility: CLINIC | Age: 62
End: 2022-04-11

## 2022-04-15 ENCOUNTER — TELEPHONE (OUTPATIENT)
Dept: SURGERY | Facility: CLINIC | Age: 62
End: 2022-04-15

## 2022-04-26 ENCOUNTER — TELEPHONE (OUTPATIENT)
Dept: CARDIOLOGY | Age: 62
End: 2022-04-26

## 2022-04-29 ENCOUNTER — LAB REQUISITION (OUTPATIENT)
Dept: LAB | Age: 62
End: 2022-04-29

## 2022-04-29 DIAGNOSIS — R30.0 DYSURIA: ICD-10-CM

## 2022-04-29 PROCEDURE — 81003 URINALYSIS AUTO W/O SCOPE: CPT | Performed by: CLINICAL MEDICAL LABORATORY

## 2022-04-30 LAB
APPEARANCE UR: CLEAR
BILIRUB UR QL STRIP: NEGATIVE
COLOR UR: YELLOW
GLUCOSE UR STRIP-MCNC: NEGATIVE MG/DL
HGB UR QL STRIP: NEGATIVE
KETONES UR STRIP-MCNC: NEGATIVE MG/DL
LEUKOCYTE ESTERASE UR QL STRIP: NEGATIVE
NITRITE UR QL STRIP: NEGATIVE
PH UR STRIP: 6 [PH] (ref 5–7)
PROT UR STRIP-MCNC: NEGATIVE MG/DL
SP GR UR STRIP: 1.02 (ref 1–1.03)
UROBILINOGEN UR STRIP-MCNC: 0.2 MG/DL

## 2022-05-11 ENCOUNTER — IMAGING SERVICES (OUTPATIENT)
Dept: OTHER | Age: 62
End: 2022-05-11
Attending: OBSTETRICS & GYNECOLOGY

## 2022-05-13 ENCOUNTER — TELEPHONE (OUTPATIENT)
Dept: SURGERY | Facility: CLINIC | Age: 62
End: 2022-05-13

## 2022-05-13 NOTE — TELEPHONE ENCOUNTER
Patient called and left  informing that she will be out of town next Tuesday and needs rx refills for Vyvanse medication. Last office visit on 3/21/22 please advise.  Routed to

## 2022-06-14 ENCOUNTER — TELEPHONE (OUTPATIENT)
Dept: SURGERY | Facility: CLINIC | Age: 62
End: 2022-06-14

## 2022-06-14 DIAGNOSIS — R63.2 BINGE EATING: ICD-10-CM

## 2022-06-20 RX ORDER — LINACLOTIDE 72 UG/1
1 CAPSULE, GELATIN COATED ORAL DAILY
Qty: 30 CAPSULE | Refills: 0 | Status: SHIPPED | OUTPATIENT
Start: 2022-06-20 | End: 2022-07-20

## 2022-07-01 ENCOUNTER — OFFICE VISIT (OUTPATIENT)
Dept: SURGERY | Facility: CLINIC | Age: 62
End: 2022-07-01
Payer: MEDICARE

## 2022-07-01 VITALS
HEIGHT: 60 IN | SYSTOLIC BLOOD PRESSURE: 113 MMHG | DIASTOLIC BLOOD PRESSURE: 64 MMHG | WEIGHT: 142.88 LBS | HEART RATE: 72 BPM | BODY MASS INDEX: 28.05 KG/M2 | OXYGEN SATURATION: 97 %

## 2022-07-01 DIAGNOSIS — R63.2 BINGE EATING: ICD-10-CM

## 2022-07-01 DIAGNOSIS — Z51.81 ENCOUNTER FOR THERAPEUTIC DRUG MONITORING: ICD-10-CM

## 2022-07-01 DIAGNOSIS — I10 ESSENTIAL HYPERTENSION: Primary | ICD-10-CM

## 2022-07-01 DIAGNOSIS — E66.3 OVERWEIGHT (BMI 25.0-29.9): ICD-10-CM

## 2022-07-01 DIAGNOSIS — E78.2 MIXED HYPERLIPIDEMIA: ICD-10-CM

## 2022-07-01 PROCEDURE — 99214 OFFICE O/P EST MOD 30 MIN: CPT | Performed by: INTERNAL MEDICINE

## 2022-07-01 RX ORDER — VENLAFAXINE HYDROCHLORIDE 37.5 MG/1
37.5 CAPSULE, EXTENDED RELEASE ORAL DAILY
COMMUNITY
Start: 2022-05-12

## 2022-07-18 RX ORDER — LINACLOTIDE 72 UG/1
1 CAPSULE, GELATIN COATED ORAL DAILY
Qty: 30 CAPSULE | Refills: 0 | Status: SHIPPED | OUTPATIENT
Start: 2022-07-18 | End: 2022-08-17

## 2022-08-04 ENCOUNTER — APPOINTMENT (OUTPATIENT)
Dept: CARDIOLOGY | Age: 62
End: 2022-08-04

## 2022-08-24 RX ORDER — LINACLOTIDE 72 UG/1
1 CAPSULE, GELATIN COATED ORAL DAILY
Qty: 30 CAPSULE | Refills: 0 | Status: SHIPPED | OUTPATIENT
Start: 2022-08-24 | End: 2022-09-23

## 2022-08-30 RX ORDER — LINACLOTIDE 72 UG/1
1 CAPSULE, GELATIN COATED ORAL DAILY
Qty: 30 CAPSULE | Refills: 0 | Status: SHIPPED | OUTPATIENT
Start: 2022-08-30 | End: 2022-09-29

## 2022-09-22 ASSESSMENT — ENCOUNTER SYMPTOMS
HEMATOCHEZIA: 0
WEIGHT LOSS: 0
WEIGHT GAIN: 0
ALLERGIC/IMMUNOLOGIC COMMENTS: NO NEW FOOD ALLERGIES
SUSPICIOUS LESIONS: 0
FEVER: 0
CHILLS: 0
BRUISES/BLEEDS EASILY: 0
HEMOPTYSIS: 0
COUGH: 0

## 2022-09-23 ENCOUNTER — APPOINTMENT (OUTPATIENT)
Dept: CARDIOLOGY | Age: 62
End: 2022-09-23

## 2022-09-25 ASSESSMENT — ENCOUNTER SYMPTOMS
BRUISES/BLEEDS EASILY: 0
HEMATOCHEZIA: 0
HEMOPTYSIS: 0
SUSPICIOUS LESIONS: 0
COUGH: 0
CHILLS: 0
WEIGHT GAIN: 0
FEVER: 0
ALLERGIC/IMMUNOLOGIC COMMENTS: NO NEW FOOD ALLERGIES
WEIGHT LOSS: 0

## 2022-09-27 ENCOUNTER — OFFICE VISIT (OUTPATIENT)
Dept: CARDIOLOGY | Age: 62
End: 2022-09-27

## 2022-09-27 VITALS
WEIGHT: 142.53 LBS | SYSTOLIC BLOOD PRESSURE: 130 MMHG | HEIGHT: 61 IN | DIASTOLIC BLOOD PRESSURE: 83 MMHG | HEART RATE: 75 BPM | BODY MASS INDEX: 26.91 KG/M2

## 2022-09-27 DIAGNOSIS — G47.33 OBSTRUCTIVE SLEEP APNEA SYNDROME: ICD-10-CM

## 2022-09-27 DIAGNOSIS — E66.9 OBESITY, UNSPECIFIED CLASSIFICATION, UNSPECIFIED OBESITY TYPE, UNSPECIFIED WHETHER SERIOUS COMORBIDITY PRESENT: ICD-10-CM

## 2022-09-27 DIAGNOSIS — E78.00 PURE HYPERCHOLESTEROLEMIA: ICD-10-CM

## 2022-09-27 DIAGNOSIS — I50.32 CHRONIC DIASTOLIC HEART FAILURE (CMD): Primary | ICD-10-CM

## 2022-09-27 PROCEDURE — 99214 OFFICE O/P EST MOD 30 MIN: CPT | Performed by: INTERNAL MEDICINE

## 2022-09-27 RX ORDER — VENLAFAXINE HYDROCHLORIDE 37.5 MG/1
37.5 CAPSULE, EXTENDED RELEASE ORAL DAILY
Status: ON HOLD | COMMUNITY
Start: 2022-05-12 | End: 2023-05-20

## 2022-09-27 SDOH — HEALTH STABILITY: PHYSICAL HEALTH: ON AVERAGE, HOW MANY DAYS PER WEEK DO YOU ENGAGE IN MODERATE TO STRENUOUS EXERCISE (LIKE A BRISK WALK)?: 3 DAYS

## 2022-09-27 SDOH — HEALTH STABILITY: PHYSICAL HEALTH: ON AVERAGE, HOW MANY MINUTES DO YOU ENGAGE IN EXERCISE AT THIS LEVEL?: 40 MIN

## 2022-09-27 ASSESSMENT — PATIENT HEALTH QUESTIONNAIRE - PHQ9
CLINICAL INTERPRETATION OF PHQ2 SCORE: NO FURTHER SCREENING NEEDED
2. FEELING DOWN, DEPRESSED OR HOPELESS: NOT AT ALL
SUM OF ALL RESPONSES TO PHQ9 QUESTIONS 1 AND 2: 0
SUM OF ALL RESPONSES TO PHQ9 QUESTIONS 1 AND 2: 0
1. LITTLE INTEREST OR PLEASURE IN DOING THINGS: NOT AT ALL

## 2022-10-13 ENCOUNTER — OFFICE VISIT (OUTPATIENT)
Dept: SURGERY | Facility: CLINIC | Age: 62
End: 2022-10-13
Payer: MEDICARE

## 2022-10-13 VITALS
DIASTOLIC BLOOD PRESSURE: 90 MMHG | HEIGHT: 60 IN | BODY MASS INDEX: 28.98 KG/M2 | OXYGEN SATURATION: 97 % | SYSTOLIC BLOOD PRESSURE: 141 MMHG | WEIGHT: 147.63 LBS | HEART RATE: 76 BPM

## 2022-10-13 DIAGNOSIS — I10 ESSENTIAL HYPERTENSION: Primary | ICD-10-CM

## 2022-10-13 DIAGNOSIS — R63.2 BINGE EATING: ICD-10-CM

## 2022-10-13 DIAGNOSIS — E78.2 MIXED HYPERLIPIDEMIA: ICD-10-CM

## 2022-10-13 DIAGNOSIS — E66.3 OVERWEIGHT (BMI 25.0-29.9): ICD-10-CM

## 2022-10-13 DIAGNOSIS — Z51.81 ENCOUNTER FOR THERAPEUTIC DRUG MONITORING: ICD-10-CM

## 2022-10-13 PROCEDURE — 99214 OFFICE O/P EST MOD 30 MIN: CPT | Performed by: INTERNAL MEDICINE

## 2022-11-02 RX ORDER — LINACLOTIDE 72 UG/1
1 CAPSULE, GELATIN COATED ORAL DAILY
Qty: 30 CAPSULE | Refills: 0 | Status: SHIPPED | OUTPATIENT
Start: 2022-11-02 | End: 2022-12-02

## 2022-12-05 RX ORDER — LINACLOTIDE 72 UG/1
1 CAPSULE, GELATIN COATED ORAL DAILY
Qty: 30 CAPSULE | Refills: 0 | Status: SHIPPED | OUTPATIENT
Start: 2022-12-05 | End: 2023-01-04

## 2023-01-01 NOTE — H&P
HPI: 62year old female with retained orthopedic hardware in her right big toe    Past Medical History   Diagnosis Date   • GERD    • Other and unspecified hyperlipidemia    • Behcet's disease (HCC)      NEURO, BRAIN, GI  AND SKIN INVOLVEMENT   • Anxiety s SICKNESS/  long time to awaken   • High blood pressure      Pt denies     Family History   Problem Relation Age of Onset   • Diabetes Father    • Cancer Father      prostate cancer   • Lung Disorder Mother      COPD       Social History   Marital Status: Kory Hernandez mg total) by mouth nightly. Disp: 90 tablet Rfl: 1   DULoxetine HCl (CYMBALTA) 30 MG Oral Cap DR Particles Take 1 capsule (30 mg total) by mouth daily.  Disp: 90 capsule Rfl: 1   gabapentin 300 MG Oral Cap Take 1 capsule (300 mg total) by mouth 3 (three) ti Vitro Strip TEST SUGARS EVERY MORNING, ALTERNATING WITH 2 HOURS POST PRANDIAL DAILY Disp: 150 strip Rfl: 0       Latex                   Itching, Swelling    Comment:blisters             redness  Tefadumo [Gatifloxaci*    Anaphylaxis    Comment:Facial swelli Screen#: [id number]  Screen Date: 2023  Screen Comment: N/A    Screen#: [id number]  Screen Date: 2023  Screen Comment: N/A

## 2023-01-18 RX ORDER — LINACLOTIDE 72 UG/1
1 CAPSULE, GELATIN COATED ORAL DAILY
Qty: 30 CAPSULE | Refills: 0 | Status: SHIPPED | OUTPATIENT
Start: 2023-01-18 | End: 2023-02-17

## 2023-01-19 ENCOUNTER — OFFICE VISIT (OUTPATIENT)
Dept: SURGERY | Facility: CLINIC | Age: 63
End: 2023-01-19
Payer: MEDICARE

## 2023-01-19 VITALS
WEIGHT: 150.38 LBS | SYSTOLIC BLOOD PRESSURE: 116 MMHG | HEART RATE: 82 BPM | OXYGEN SATURATION: 96 % | BODY MASS INDEX: 29.52 KG/M2 | DIASTOLIC BLOOD PRESSURE: 79 MMHG | HEIGHT: 60 IN

## 2023-01-19 DIAGNOSIS — E78.2 MIXED HYPERLIPIDEMIA: ICD-10-CM

## 2023-01-19 DIAGNOSIS — R63.2 BINGE EATING: ICD-10-CM

## 2023-01-19 DIAGNOSIS — I10 ESSENTIAL HYPERTENSION: Primary | ICD-10-CM

## 2023-01-19 DIAGNOSIS — E66.3 OVERWEIGHT (BMI 25.0-29.9): ICD-10-CM

## 2023-01-19 DIAGNOSIS — Z51.81 ENCOUNTER FOR THERAPEUTIC DRUG MONITORING: ICD-10-CM

## 2023-01-19 PROCEDURE — 99214 OFFICE O/P EST MOD 30 MIN: CPT | Performed by: INTERNAL MEDICINE

## 2023-01-19 RX ORDER — VENLAFAXINE HYDROCHLORIDE 75 MG/1
CAPSULE, EXTENDED RELEASE ORAL
COMMUNITY
Start: 2022-11-01

## 2023-04-26 ENCOUNTER — APPOINTMENT (OUTPATIENT)
Dept: MRI IMAGING | Age: 63
End: 2023-04-26
Attending: PSYCHIATRY & NEUROLOGY

## 2023-04-26 ENCOUNTER — HOSPITAL ENCOUNTER (EMERGENCY)
Age: 63
Discharge: HOME | End: 2023-04-26
Attending: EMERGENCY MEDICINE

## 2023-04-26 ENCOUNTER — APPOINTMENT (OUTPATIENT)
Dept: NEUROLOGY | Age: 63
End: 2023-04-26
Attending: PSYCHIATRY & NEUROLOGY

## 2023-04-26 ENCOUNTER — TELEPHONE (OUTPATIENT)
Dept: CARDIOLOGY | Age: 63
End: 2023-04-26

## 2023-04-26 VITALS
SYSTOLIC BLOOD PRESSURE: 136 MMHG | DIASTOLIC BLOOD PRESSURE: 82 MMHG | WEIGHT: 150.57 LBS | TEMPERATURE: 97.9 F | RESPIRATION RATE: 16 BRPM | HEART RATE: 71 BPM | BODY MASS INDEX: 28.45 KG/M2 | OXYGEN SATURATION: 95 %

## 2023-04-26 DIAGNOSIS — R25.3 MUSCLE TWITCHING: Primary | ICD-10-CM

## 2023-04-26 LAB
ALBUMIN SERPL-MCNC: 3.7 G/DL (ref 3.6–5.1)
ALBUMIN/GLOB SERPL: 1.1 {RATIO} (ref 1–2.4)
ALP SERPL-CCNC: 58 UNITS/L (ref 45–117)
ALT SERPL-CCNC: 33 UNITS/L
ANION GAP SERPL CALC-SCNC: 5 MMOL/L (ref 7–19)
APPEARANCE UR: CLEAR
AST SERPL-CCNC: 29 UNITS/L
ATRIAL RATE (BPM): 76
BASOPHILS # BLD: 0.1 K/MCL (ref 0–0.3)
BASOPHILS NFR BLD: 1 %
BILIRUB SERPL-MCNC: 0.3 MG/DL (ref 0.2–1)
BILIRUB UR QL STRIP: NEGATIVE
BUN SERPL-MCNC: 33 MG/DL (ref 6–20)
BUN/CREAT SERPL: 52 (ref 7–25)
CALCIUM SERPL-MCNC: 9.6 MG/DL (ref 8.4–10.2)
CHLORIDE SERPL-SCNC: 107 MMOL/L (ref 97–110)
CK SERPL-CCNC: 128 UNITS/L (ref 26–192)
CO2 SERPL-SCNC: 27 MMOL/L (ref 21–32)
COLOR UR: NORMAL
CREAT SERPL-MCNC: 0.63 MG/DL (ref 0.51–0.95)
DEPRECATED RDW RBC: 45.3 FL (ref 39–50)
EOSINOPHIL # BLD: 0 K/MCL (ref 0–0.5)
EOSINOPHIL NFR BLD: 0 %
ERYTHROCYTE [DISTWIDTH] IN BLOOD: 13 % (ref 11–15)
FASTING DURATION TIME PATIENT: ABNORMAL H
GFR SERPLBLD BASED ON 1.73 SQ M-ARVRAT: >90 ML/MIN
GLOBULIN SER-MCNC: 3.4 G/DL (ref 2–4)
GLUCOSE SERPL-MCNC: 111 MG/DL (ref 70–99)
GLUCOSE UR STRIP-MCNC: NEGATIVE MG/DL
HCT VFR BLD CALC: 40.7 % (ref 36–46.5)
HGB BLD-MCNC: 13.6 G/DL (ref 12–15.5)
HGB UR QL STRIP: NEGATIVE
IMM GRANULOCYTES # BLD AUTO: 0 K/MCL (ref 0–0.2)
IMM GRANULOCYTES # BLD: 0 %
KETONES UR STRIP-MCNC: NEGATIVE MG/DL
LEUKOCYTE ESTERASE UR QL STRIP: NEGATIVE
LYMPHOCYTES # BLD: 1.3 K/MCL (ref 1–4)
LYMPHOCYTES NFR BLD: 13 %
MAGNESIUM SERPL-MCNC: 2 MG/DL (ref 1.7–2.4)
MCH RBC QN AUTO: 31.9 PG (ref 26–34)
MCHC RBC AUTO-ENTMCNC: 33.4 G/DL (ref 32–36.5)
MCV RBC AUTO: 95.3 FL (ref 78–100)
MONOCYTES # BLD: 0.6 K/MCL (ref 0.3–0.9)
MONOCYTES NFR BLD: 5 %
NEUTROPHILS # BLD: 8.3 K/MCL (ref 1.8–7.7)
NEUTROPHILS NFR BLD: 81 %
NITRITE UR QL STRIP: NEGATIVE
NRBC BLD MANUAL-RTO: 0 /100 WBC
P AXIS (DEGREES): 37
PH UR STRIP: 6 [PH] (ref 5–7)
PLATELET # BLD AUTO: 238 K/MCL (ref 140–450)
POTASSIUM SERPL-SCNC: 4.2 MMOL/L (ref 3.4–5.1)
PR-INTERVAL (MSEC): 156
PROT SERPL-MCNC: 7.1 G/DL (ref 6.4–8.2)
PROT UR STRIP-MCNC: NEGATIVE MG/DL
QRS-INTERVAL (MSEC): 82
QT-INTERVAL (MSEC): 338
QTC: 380
R AXIS (DEGREES): 15
RAINBOW EXTRA TUBES HOLD SPECIMEN: NORMAL
RAINBOW EXTRA TUBES HOLD SPECIMEN: NORMAL
RBC # BLD: 4.27 MIL/MCL (ref 4–5.2)
REPORT TEXT: NORMAL
SODIUM SERPL-SCNC: 135 MMOL/L (ref 135–145)
SP GR UR STRIP: 1.01 (ref 1–1.03)
T AXIS (DEGREES): 48
UROBILINOGEN UR STRIP-MCNC: 0.2 MG/DL
VENTRICULAR RATE EKG/MIN (BPM): 76
WBC # BLD: 10.2 K/MCL (ref 4.2–11)

## 2023-04-26 PROCEDURE — 93005 ELECTROCARDIOGRAM TRACING: CPT | Performed by: EMERGENCY MEDICINE

## 2023-04-26 PROCEDURE — 83735 ASSAY OF MAGNESIUM: CPT | Performed by: EMERGENCY MEDICINE

## 2023-04-26 PROCEDURE — 70553 MRI BRAIN STEM W/O & W/DYE: CPT

## 2023-04-26 PROCEDURE — G1004 CDSM NDSC: HCPCS

## 2023-04-26 PROCEDURE — 95819 EEG AWAKE AND ASLEEP: CPT

## 2023-04-26 PROCEDURE — A9585 GADOBUTROL INJECTION: HCPCS | Performed by: PSYCHIATRY & NEUROLOGY

## 2023-04-26 PROCEDURE — 36415 COLL VENOUS BLD VENIPUNCTURE: CPT

## 2023-04-26 PROCEDURE — 10002805 HB CONTRAST AGENT: Performed by: PSYCHIATRY & NEUROLOGY

## 2023-04-26 PROCEDURE — 99285 EMERGENCY DEPT VISIT HI MDM: CPT

## 2023-04-26 PROCEDURE — 85025 COMPLETE CBC W/AUTO DIFF WBC: CPT | Performed by: EMERGENCY MEDICINE

## 2023-04-26 PROCEDURE — 81003 URINALYSIS AUTO W/O SCOPE: CPT | Performed by: EMERGENCY MEDICINE

## 2023-04-26 PROCEDURE — 82550 ASSAY OF CK (CPK): CPT | Performed by: EMERGENCY MEDICINE

## 2023-04-26 PROCEDURE — 80053 COMPREHEN METABOLIC PANEL: CPT | Performed by: EMERGENCY MEDICINE

## 2023-04-26 RX ORDER — GADOBUTROL 604.72 MG/ML
7.5 INJECTION INTRAVENOUS ONCE
Status: COMPLETED | OUTPATIENT
Start: 2023-04-26 | End: 2023-04-26

## 2023-04-26 RX ADMIN — GADOBUTROL 7.5 ML: 604.72 INJECTION INTRAVENOUS at 16:45

## 2023-04-26 ASSESSMENT — PAIN SCALES - GENERAL: PAINLEVEL_OUTOF10: 0

## 2023-05-13 ASSESSMENT — ENCOUNTER SYMPTOMS
HEMOPTYSIS: 0
ALLERGIC/IMMUNOLOGIC COMMENTS: NO NEW FOOD ALLERGIES
FEVER: 0
HEMATOCHEZIA: 0
WEIGHT LOSS: 0
SUSPICIOUS LESIONS: 0
WEIGHT GAIN: 0
CHILLS: 0
BRUISES/BLEEDS EASILY: 0
COUGH: 0

## 2023-05-15 ENCOUNTER — OFFICE VISIT (OUTPATIENT)
Dept: CARDIOLOGY | Age: 63
End: 2023-05-15

## 2023-05-15 VITALS
HEART RATE: 72 BPM | DIASTOLIC BLOOD PRESSURE: 76 MMHG | HEIGHT: 61 IN | BODY MASS INDEX: 28.91 KG/M2 | SYSTOLIC BLOOD PRESSURE: 119 MMHG | WEIGHT: 153.11 LBS

## 2023-05-15 DIAGNOSIS — E78.00 PURE HYPERCHOLESTEROLEMIA: ICD-10-CM

## 2023-05-15 DIAGNOSIS — G47.33 OBSTRUCTIVE SLEEP APNEA SYNDROME: ICD-10-CM

## 2023-05-15 DIAGNOSIS — R07.2 PRECORDIAL PAIN: Primary | ICD-10-CM

## 2023-05-15 DIAGNOSIS — I50.32 CHRONIC DIASTOLIC HEART FAILURE (CMD): ICD-10-CM

## 2023-05-15 PROCEDURE — 99214 OFFICE O/P EST MOD 30 MIN: CPT | Performed by: INTERNAL MEDICINE

## 2023-05-15 RX ORDER — VENLAFAXINE HYDROCHLORIDE 75 MG/1
75 CAPSULE, EXTENDED RELEASE ORAL DAILY
Status: ON HOLD | COMMUNITY
Start: 2023-03-21 | End: 2023-05-21 | Stop reason: HOSPADM

## 2023-05-15 RX ORDER — NORETHINDRONE ACETATE AND ETHINYL ESTRADIOL .5; 2.5 MG/1; UG/1
1 TABLET ORAL DAILY
COMMUNITY
Start: 2023-05-02

## 2023-05-15 SDOH — HEALTH STABILITY: PHYSICAL HEALTH: ON AVERAGE, HOW MANY MINUTES DO YOU ENGAGE IN EXERCISE AT THIS LEVEL?: 60 MIN

## 2023-05-15 SDOH — HEALTH STABILITY: PHYSICAL HEALTH: ON AVERAGE, HOW MANY DAYS PER WEEK DO YOU ENGAGE IN MODERATE TO STRENUOUS EXERCISE (LIKE A BRISK WALK)?: 3 DAYS

## 2023-05-15 ASSESSMENT — PATIENT HEALTH QUESTIONNAIRE - PHQ9
SUM OF ALL RESPONSES TO PHQ9 QUESTIONS 1 AND 2: 0
SUM OF ALL RESPONSES TO PHQ9 QUESTIONS 1 AND 2: 0
2. FEELING DOWN, DEPRESSED OR HOPELESS: NOT AT ALL
1. LITTLE INTEREST OR PLEASURE IN DOING THINGS: NOT AT ALL
CLINICAL INTERPRETATION OF PHQ2 SCORE: NO FURTHER SCREENING NEEDED

## 2023-05-18 ENCOUNTER — OFFICE VISIT (OUTPATIENT)
Dept: SURGERY | Facility: CLINIC | Age: 63
End: 2023-05-18
Payer: MEDICARE

## 2023-05-18 VITALS
WEIGHT: 148.63 LBS | DIASTOLIC BLOOD PRESSURE: 83 MMHG | SYSTOLIC BLOOD PRESSURE: 133 MMHG | OXYGEN SATURATION: 98 % | HEART RATE: 71 BPM | BODY MASS INDEX: 29.18 KG/M2 | HEIGHT: 60 IN

## 2023-05-18 DIAGNOSIS — Z51.81 ENCOUNTER FOR THERAPEUTIC DRUG MONITORING: ICD-10-CM

## 2023-05-18 DIAGNOSIS — E66.3 OVERWEIGHT (BMI 25.0-29.9): ICD-10-CM

## 2023-05-18 DIAGNOSIS — R63.2 BINGE EATING: ICD-10-CM

## 2023-05-18 DIAGNOSIS — E78.2 MIXED HYPERLIPIDEMIA: ICD-10-CM

## 2023-05-18 DIAGNOSIS — I10 ESSENTIAL HYPERTENSION: Primary | ICD-10-CM

## 2023-05-18 PROCEDURE — 99214 OFFICE O/P EST MOD 30 MIN: CPT | Performed by: INTERNAL MEDICINE

## 2023-05-20 ENCOUNTER — HOSPITAL ENCOUNTER (OUTPATIENT)
Age: 63
Setting detail: OBSERVATION
Discharge: HOME | End: 2023-05-21
Attending: EMERGENCY MEDICINE | Admitting: HOSPITALIST

## 2023-05-20 ENCOUNTER — APPOINTMENT (OUTPATIENT)
Dept: CT IMAGING | Age: 63
End: 2023-05-20
Attending: EMERGENCY MEDICINE

## 2023-05-20 DIAGNOSIS — T42.71XA: ICD-10-CM

## 2023-05-20 DIAGNOSIS — T50.901A ACCIDENTAL OVERDOSE, INITIAL ENCOUNTER: ICD-10-CM

## 2023-05-20 DIAGNOSIS — F33.0 MILD EPISODE OF RECURRENT MAJOR DEPRESSIVE DISORDER (CMD): Primary | ICD-10-CM

## 2023-05-20 DIAGNOSIS — G92.9 ENCEPHALOPATHY, TOXIC: ICD-10-CM

## 2023-05-20 DIAGNOSIS — F19.10 POLYSUBSTANCE ABUSE (CMD): ICD-10-CM

## 2023-05-20 LAB
ALBUMIN SERPL-MCNC: 4.3 G/DL (ref 3.6–5.1)
ALBUMIN SERPL-MCNC: 4.3 G/DL (ref 3.6–5.1)
ALP SERPL-CCNC: 63 UNITS/L (ref 45–117)
ALP SERPL-CCNC: 64 UNITS/L (ref 45–117)
ALT SERPL-CCNC: 83 UNITS/L
ALT SERPL-CCNC: 85 UNITS/L
AMPHETAMINES UR QL SCN>500 NG/ML: NEGATIVE
ANION GAP SERPL CALC-SCNC: 11 MMOL/L (ref 7–19)
APPEARANCE UR: CLEAR
AST SERPL-CCNC: 167 UNITS/L
AST SERPL-CCNC: 171 UNITS/L
ATRIAL RATE (BPM): 79
BACTERIA #/AREA URNS HPF: ABNORMAL /HPF
BARBITURATES UR QL SCN>200 NG/ML: POSITIVE
BASOPHILS # BLD: 0.1 K/MCL (ref 0–0.3)
BASOPHILS NFR BLD: 0 %
BENZODIAZ UR QL SCN>200 NG/ML: NEGATIVE
BILIRUB CONJ SERPL-MCNC: 0.1 MG/DL (ref 0–0.2)
BILIRUB CONJ SERPL-MCNC: <0.1 MG/DL (ref 0–0.2)
BILIRUB SERPL-MCNC: 0.4 MG/DL (ref 0.2–1)
BILIRUB SERPL-MCNC: 0.4 MG/DL (ref 0.2–1)
BILIRUB UR QL STRIP: NEGATIVE
BUN SERPL-MCNC: 17 MG/DL (ref 6–20)
BUN/CREAT SERPL: 23 (ref 7–25)
BZE UR QL SCN>150 NG/ML: NEGATIVE
CALCIUM SERPL-MCNC: 10.6 MG/DL (ref 8.4–10.2)
CANNABINOIDS UR QL SCN>50 NG/ML: POSITIVE
CHLORIDE SERPL-SCNC: 106 MMOL/L (ref 97–110)
CO2 SERPL-SCNC: 26 MMOL/L (ref 21–32)
COLOR UR: COLORLESS
CREAT SERPL-MCNC: 0.75 MG/DL (ref 0.51–0.95)
DEPRECATED RDW RBC: 45.8 FL (ref 39–50)
EOSINOPHIL # BLD: 0 K/MCL (ref 0–0.5)
EOSINOPHIL NFR BLD: 0 %
ERYTHROCYTE [DISTWIDTH] IN BLOOD: 13 % (ref 11–15)
ETHANOL SERPL-MCNC: NORMAL MG/DL
FASTING DURATION TIME PATIENT: ABNORMAL H
GFR SERPLBLD BASED ON 1.73 SQ M-ARVRAT: 89 ML/MIN
GLUCOSE SERPL-MCNC: 110 MG/DL (ref 70–99)
GLUCOSE UR STRIP-MCNC: NEGATIVE MG/DL
HCT VFR BLD CALC: 45.5 % (ref 36–46.5)
HGB BLD-MCNC: 15.2 G/DL (ref 12–15.5)
HGB UR QL STRIP: NEGATIVE
HYALINE CASTS #/AREA URNS LPF: ABNORMAL /LPF
IMM GRANULOCYTES # BLD AUTO: 0.1 K/MCL (ref 0–0.2)
IMM GRANULOCYTES # BLD: 0 %
KETONES UR STRIP-MCNC: NEGATIVE MG/DL
LEUKOCYTE ESTERASE UR QL STRIP: ABNORMAL
LYMPHOCYTES # BLD: 2.6 K/MCL (ref 1–4)
LYMPHOCYTES NFR BLD: 18 %
MAGNESIUM SERPL-MCNC: 2.1 MG/DL (ref 1.7–2.4)
MCH RBC QN AUTO: 32.1 PG (ref 26–34)
MCHC RBC AUTO-ENTMCNC: 33.4 G/DL (ref 32–36.5)
MCV RBC AUTO: 96 FL (ref 78–100)
MONOCYTES # BLD: 1.5 K/MCL (ref 0.3–0.9)
MONOCYTES NFR BLD: 11 %
NEUTROPHILS # BLD: 9.7 K/MCL (ref 1.8–7.7)
NEUTROPHILS NFR BLD: 71 %
NITRITE UR QL STRIP: NEGATIVE
NRBC BLD MANUAL-RTO: 0 /100 WBC
OPIATES UR QL SCN>300 NG/ML: POSITIVE
P AXIS (DEGREES): -8
PCP UR QL SCN>25 NG/ML: NEGATIVE
PH UR STRIP: 6 [PH] (ref 5–7)
PLATELET # BLD AUTO: 257 K/MCL (ref 140–450)
POTASSIUM SERPL-SCNC: 3.8 MMOL/L (ref 3.4–5.1)
PR-INTERVAL (MSEC): 138
PROCALCITONIN SERPL IA-MCNC: <0.05 NG/ML
PROT SERPL-MCNC: 8.2 G/DL (ref 6.4–8.2)
PROT SERPL-MCNC: 8.6 G/DL (ref 6.4–8.2)
PROT UR STRIP-MCNC: NEGATIVE MG/DL
QRS-INTERVAL (MSEC): 78
QT-INTERVAL (MSEC): 340
QTC: 390
R AXIS (DEGREES): 24
RAINBOW EXTRA TUBES HOLD SPECIMEN: NORMAL
RBC # BLD: 4.74 MIL/MCL (ref 4–5.2)
RBC #/AREA URNS HPF: ABNORMAL /HPF
REPORT TEXT: NORMAL
SODIUM SERPL-SCNC: 139 MMOL/L (ref 135–145)
SP GR UR STRIP: <1.005 (ref 1–1.03)
SQUAMOUS #/AREA URNS HPF: ABNORMAL /HPF
T AXIS (DEGREES): 99
UROBILINOGEN UR STRIP-MCNC: 0.2 MG/DL
VENTRICULAR RATE EKG/MIN (BPM): 79
WBC # BLD: 13.9 K/MCL (ref 4.2–11)
WBC #/AREA URNS HPF: ABNORMAL /HPF

## 2023-05-20 PROCEDURE — 10002807 HB RX 258: Performed by: HOSPITALIST

## 2023-05-20 PROCEDURE — 10002803 HB RX 637: Performed by: HOSPITALIST

## 2023-05-20 PROCEDURE — 80307 DRUG TEST PRSMV CHEM ANLYZR: CPT | Performed by: EMERGENCY MEDICINE

## 2023-05-20 PROCEDURE — 10004651 HB RX, NO CHARGE ITEM: Performed by: HOSPITALIST

## 2023-05-20 PROCEDURE — 80076 HEPATIC FUNCTION PANEL: CPT | Performed by: EMERGENCY MEDICINE

## 2023-05-20 PROCEDURE — G0378 HOSPITAL OBSERVATION PER HR: HCPCS

## 2023-05-20 PROCEDURE — 84145 PROCALCITONIN (PCT): CPT | Performed by: EMERGENCY MEDICINE

## 2023-05-20 PROCEDURE — 10002805 HB CONTRAST AGENT: Performed by: EMERGENCY MEDICINE

## 2023-05-20 PROCEDURE — 10002800 HB RX 250 W HCPCS

## 2023-05-20 PROCEDURE — 10002807 HB RX 258: Performed by: EMERGENCY MEDICINE

## 2023-05-20 PROCEDURE — 96375 TX/PRO/DX INJ NEW DRUG ADDON: CPT

## 2023-05-20 PROCEDURE — 93005 ELECTROCARDIOGRAM TRACING: CPT | Performed by: EMERGENCY MEDICINE

## 2023-05-20 PROCEDURE — 80048 BASIC METABOLIC PNL TOTAL CA: CPT | Performed by: EMERGENCY MEDICINE

## 2023-05-20 PROCEDURE — 99221 1ST HOSP IP/OBS SF/LOW 40: CPT | Performed by: PSYCHIATRY & NEUROLOGY

## 2023-05-20 PROCEDURE — 70498 CT ANGIOGRAPHY NECK: CPT

## 2023-05-20 PROCEDURE — G1004 CDSM NDSC: HCPCS

## 2023-05-20 PROCEDURE — 85025 COMPLETE CBC W/AUTO DIFF WBC: CPT | Performed by: EMERGENCY MEDICINE

## 2023-05-20 PROCEDURE — C9113 INJ PANTOPRAZOLE SODIUM, VIA: HCPCS | Performed by: HOSPITALIST

## 2023-05-20 PROCEDURE — 10002800 HB RX 250 W HCPCS: Performed by: HOSPITALIST

## 2023-05-20 PROCEDURE — 96372 THER/PROPH/DIAG INJ SC/IM: CPT | Performed by: HOSPITALIST

## 2023-05-20 PROCEDURE — 83735 ASSAY OF MAGNESIUM: CPT | Performed by: EMERGENCY MEDICINE

## 2023-05-20 PROCEDURE — 70496 CT ANGIOGRAPHY HEAD: CPT

## 2023-05-20 PROCEDURE — 96361 HYDRATE IV INFUSION ADD-ON: CPT

## 2023-05-20 PROCEDURE — 82077 ASSAY SPEC XCP UR&BREATH IA: CPT | Performed by: EMERGENCY MEDICINE

## 2023-05-20 PROCEDURE — 81001 URINALYSIS AUTO W/SCOPE: CPT | Performed by: EMERGENCY MEDICINE

## 2023-05-20 PROCEDURE — 99285 EMERGENCY DEPT VISIT HI MDM: CPT

## 2023-05-20 PROCEDURE — 96374 THER/PROPH/DIAG INJ IV PUSH: CPT

## 2023-05-20 RX ORDER — OXYCODONE AND ACETAMINOPHEN 10; 325 MG/1; MG/1
1 TABLET ORAL EVERY 8 HOURS PRN
Status: DISCONTINUED | OUTPATIENT
Start: 2023-05-20 | End: 2023-05-21 | Stop reason: HOSPADM

## 2023-05-20 RX ORDER — DULOXETIN HYDROCHLORIDE 20 MG/1
60 CAPSULE, DELAYED RELEASE ORAL DAILY
Status: DISCONTINUED | OUTPATIENT
Start: 2023-05-21 | End: 2023-05-21 | Stop reason: HOSPADM

## 2023-05-20 RX ORDER — VALACYCLOVIR HYDROCHLORIDE 500 MG/1
500 TABLET, FILM COATED ORAL DAILY
Status: DISCONTINUED | OUTPATIENT
Start: 2023-05-20 | End: 2023-05-21 | Stop reason: HOSPADM

## 2023-05-20 RX ORDER — VENLAFAXINE HYDROCHLORIDE 37.5 MG/1
37.5 CAPSULE, EXTENDED RELEASE ORAL DAILY
Status: DISCONTINUED | OUTPATIENT
Start: 2023-05-20 | End: 2023-05-20

## 2023-05-20 RX ORDER — ENOXAPARIN SODIUM 100 MG/ML
40 INJECTION SUBCUTANEOUS EVERY 24 HOURS
Status: DISCONTINUED | OUTPATIENT
Start: 2023-05-20 | End: 2023-05-21 | Stop reason: HOSPADM

## 2023-05-20 RX ORDER — HEPARIN SODIUM 5000 [USP'U]/ML
5000 INJECTION, SOLUTION INTRAVENOUS; SUBCUTANEOUS EVERY 8 HOURS SCHEDULED
Status: DISCONTINUED | OUTPATIENT
Start: 2023-05-20 | End: 2023-05-20

## 2023-05-20 RX ORDER — ONDANSETRON 2 MG/ML
4 INJECTION INTRAMUSCULAR; INTRAVENOUS EVERY 12 HOURS PRN
Status: DISCONTINUED | OUTPATIENT
Start: 2023-05-20 | End: 2023-05-21 | Stop reason: HOSPADM

## 2023-05-20 RX ORDER — PANTOPRAZOLE SODIUM 40 MG/1
40 TABLET, DELAYED RELEASE ORAL
Status: DISCONTINUED | OUTPATIENT
Start: 2023-05-20 | End: 2023-05-21 | Stop reason: HOSPADM

## 2023-05-20 RX ORDER — GABAPENTIN 300 MG/1
300 CAPSULE ORAL 2 TIMES DAILY
Status: DISCONTINUED | OUTPATIENT
Start: 2023-05-21 | End: 2023-05-21 | Stop reason: HOSPADM

## 2023-05-20 RX ORDER — DULOXETIN HYDROCHLORIDE 30 MG/1
30 CAPSULE, DELAYED RELEASE ORAL DAILY
Status: DISCONTINUED | OUTPATIENT
Start: 2023-05-20 | End: 2023-05-20

## 2023-05-20 RX ORDER — DICLOFENAC SODIUM 75 MG/1
75 TABLET, DELAYED RELEASE ORAL 2 TIMES DAILY
COMMUNITY

## 2023-05-20 RX ORDER — 0.9 % SODIUM CHLORIDE 0.9 %
2 VIAL (ML) INJECTION EVERY 12 HOURS SCHEDULED
Status: DISCONTINUED | OUTPATIENT
Start: 2023-05-20 | End: 2023-05-21 | Stop reason: HOSPADM

## 2023-05-20 RX ORDER — SODIUM CHLORIDE 9 MG/ML
INJECTION, SOLUTION INTRAVENOUS CONTINUOUS
Status: DISCONTINUED | OUTPATIENT
Start: 2023-05-20 | End: 2023-05-21

## 2023-05-20 RX ORDER — DROPERIDOL 2.5 MG/ML
0.62 INJECTION, SOLUTION INTRAMUSCULAR; INTRAVENOUS ONCE
Status: COMPLETED | OUTPATIENT
Start: 2023-05-20 | End: 2023-05-20

## 2023-05-20 RX ORDER — ALPRAZOLAM 1 MG/1
1 TABLET ORAL 2 TIMES DAILY PRN
Status: DISCONTINUED | OUTPATIENT
Start: 2023-05-20 | End: 2023-05-21 | Stop reason: HOSPADM

## 2023-05-20 RX ORDER — VENLAFAXINE HYDROCHLORIDE 37.5 MG/1
37.5 CAPSULE, EXTENDED RELEASE ORAL DAILY
COMMUNITY

## 2023-05-20 RX ORDER — PANTOPRAZOLE SODIUM 40 MG/10ML
40 INJECTION, POWDER, LYOPHILIZED, FOR SOLUTION INTRAVENOUS NIGHTLY
Status: DISCONTINUED | OUTPATIENT
Start: 2023-05-20 | End: 2023-05-20

## 2023-05-20 RX ORDER — DROPERIDOL 2.5 MG/ML
INJECTION, SOLUTION INTRAMUSCULAR; INTRAVENOUS
Status: COMPLETED
Start: 2023-05-20 | End: 2023-05-20

## 2023-05-20 RX ORDER — DEXTROAMPHETAMINE SACCHARATE, AMPHETAMINE ASPARTATE, DEXTROAMPHETAMINE SULFATE AND AMPHETAMINE SULFATE 2.5; 2.5; 2.5; 2.5 MG/1; MG/1; MG/1; MG/1
20 TABLET ORAL 2 TIMES DAILY
Status: DISCONTINUED | OUTPATIENT
Start: 2023-05-20 | End: 2023-05-21 | Stop reason: HOSPADM

## 2023-05-20 RX ORDER — DEXTROAMPHETAMINE SACCHARATE, AMPHETAMINE ASPARTATE, DEXTROAMPHETAMINE SULFATE AND AMPHETAMINE SULFATE 5; 5; 5; 5 MG/1; MG/1; MG/1; MG/1
20 TABLET ORAL 2 TIMES DAILY
COMMUNITY

## 2023-05-20 RX ORDER — BUTALBITAL, ASPIRIN, AND CAFFEINE 50; 325; 40 MG/1; MG/1; MG/1
1 CAPSULE ORAL EVERY 6 HOURS PRN
COMMUNITY

## 2023-05-20 RX ORDER — TRAZODONE HYDROCHLORIDE 50 MG/1
50 TABLET ORAL NIGHTLY PRN
Status: DISCONTINUED | OUTPATIENT
Start: 2023-05-20 | End: 2023-05-21 | Stop reason: HOSPADM

## 2023-05-20 RX ADMIN — TRAZODONE HYDROCHLORIDE 50 MG: 50 TABLET ORAL at 22:00

## 2023-05-20 RX ADMIN — PANTOPRAZOLE SODIUM 40 MG: 40 INJECTION, POWDER, FOR SOLUTION INTRAVENOUS at 06:53

## 2023-05-20 RX ADMIN — ALPRAZOLAM 1 MG: 1 TABLET ORAL at 12:35

## 2023-05-20 RX ADMIN — VENLAFAXINE HYDROCHLORIDE 37.5 MG: 37.5 CAPSULE, EXTENDED RELEASE ORAL at 12:35

## 2023-05-20 RX ADMIN — PANTOPRAZOLE SODIUM 40 MG: 40 TABLET, DELAYED RELEASE ORAL at 12:35

## 2023-05-20 RX ADMIN — ALPRAZOLAM 1 MG: 1 TABLET ORAL at 21:34

## 2023-05-20 RX ADMIN — DROPERIDOL 0.62 MG: 2.5 INJECTION, SOLUTION INTRAMUSCULAR; INTRAVENOUS at 02:45

## 2023-05-20 RX ADMIN — HEPARIN SODIUM 5000 UNITS: 5000 INJECTION INTRAVENOUS; SUBCUTANEOUS at 06:57

## 2023-05-20 RX ADMIN — ONDANSETRON 4 MG: 2 INJECTION INTRAMUSCULAR; INTRAVENOUS at 06:52

## 2023-05-20 RX ADMIN — DULOXETINE HYDROCHLORIDE 30 MG: 30 CAPSULE, DELAYED RELEASE ORAL at 12:36

## 2023-05-20 RX ADMIN — SODIUM CHLORIDE: 9 INJECTION, SOLUTION INTRAVENOUS at 06:40

## 2023-05-20 RX ADMIN — SODIUM CHLORIDE, PRESERVATIVE FREE 2 ML: 5 INJECTION INTRAVENOUS at 21:35

## 2023-05-20 RX ADMIN — SODIUM CHLORIDE, POTASSIUM CHLORIDE, SODIUM LACTATE AND CALCIUM CHLORIDE 1000 ML: 600; 310; 30; 20 INJECTION, SOLUTION INTRAVENOUS at 02:48

## 2023-05-20 RX ADMIN — IOHEXOL 65 ML: 350 INJECTION, SOLUTION INTRAVENOUS at 04:33

## 2023-05-20 SDOH — HEALTH STABILITY: GENERAL: BECAUSE OF A PHYSICAL, MENTAL, OR EMOTIONAL CONDITION, DO YOU HAVE DIFFICULTY DOING ERRANDS ALONE?: NO

## 2023-05-20 SDOH — ECONOMIC STABILITY: HOUSING INSECURITY: ARE YOU WORRIED ABOUT LOSING YOUR HOUSING?: NO

## 2023-05-20 SDOH — ECONOMIC STABILITY: HOUSING INSECURITY: WHAT IS YOUR LIVING SITUATION TODAY?: HOUSE

## 2023-05-20 SDOH — SOCIAL STABILITY: SOCIAL NETWORK
HOW OFTEN DO YOU SEE OR TALK TO PEOPLE THAT YOU CARE ABOUT AND FEEL CLOSE TO? (FOR EXAMPLE: TALKING TO FRIENDS ON THE PHONE, VISITING FRIENDS OR FAMILY, GOING TO CHURCH OR CLUB MEETINGS): 5 OR MORE TIMES A WEEK

## 2023-05-20 SDOH — SOCIAL STABILITY: SOCIAL NETWORK: SUPPORT SYSTEMS: CHILDREN;FAMILY MEMBERS;FRIENDS;SPOUSE

## 2023-05-20 SDOH — ECONOMIC STABILITY: TRANSPORTATION INSECURITY
IN THE PAST 12 MONTHS, HAS LACK OF TRANSPORTATION KEPT YOU FROM MEETINGS, WORK, OR FROM GETTING THINGS NEEDED FOR DAILY LIVING?: NO

## 2023-05-20 SDOH — HEALTH STABILITY: PHYSICAL HEALTH: DO YOU HAVE DIFFICULTY DRESSING OR BATHING?: NO

## 2023-05-20 SDOH — ECONOMIC STABILITY: HOUSING INSECURITY: WHAT IS YOUR LIVING SITUATION TODAY?: SPOUSE;ADULT CHILDREN

## 2023-05-20 SDOH — ECONOMIC STABILITY: FOOD INSECURITY: HOW OFTEN IN THE PAST 12 MONTHS WERE YOU WORRIED OR STRESSED ABOUT HAVING ENOUGH MONEY TO BUY NUTRITIOUS MEALS?: NEVER

## 2023-05-20 SDOH — ECONOMIC STABILITY: TRANSPORTATION INSECURITY
IN THE PAST 12 MONTHS, HAS THE LACK OF TRANSPORTATION KEPT YOU FROM MEDICAL APPOINTMENTS OR FROM GETTING MEDICATIONS?: NO

## 2023-05-20 SDOH — ECONOMIC STABILITY: GENERAL

## 2023-05-20 SDOH — HEALTH STABILITY: GENERAL
BECAUSE OF A PHYSICAL, MENTAL, OR EMOTIONAL CONDITION, DO YOU HAVE SERIOUS DIFFICULTY CONCENTRATING, REMEMBERING OR MAKING DECISIONS?: NO

## 2023-05-20 SDOH — HEALTH STABILITY: PHYSICAL HEALTH: DO YOU HAVE SERIOUS DIFFICULTY WALKING OR CLIMBING STAIRS?: NO

## 2023-05-20 ASSESSMENT — ACTIVITIES OF DAILY LIVING (ADL)
RECENT_DECLINE_ADL: YES, ACUTE ILLNESS WITHOUT THERAPY NEEDS
RECENT_DECLINE_ADL: NO
ADL_SCORE: 12
ADL_BEFORE_ADMISSION: INDEPENDENT
ADL_SHORT_OF_BREATH: NO
ADL_SCORE: 12
ADL_SHORT_OF_BREATH: NO
ADL_BEFORE_ADMISSION: INDEPENDENT

## 2023-05-20 ASSESSMENT — ENCOUNTER SYMPTOMS
ENDOCRINE NEGATIVE: 1
NAUSEA: 0
ABDOMINAL DISTENTION: 0
HEADACHES: 0
FATIGUE: 1
AGITATION: 1
CHEST TIGHTNESS: 0
ABDOMINAL PAIN: 0
SINUS PAIN: 0
CONFUSION: 0
EYES NEGATIVE: 1
NERVOUS/ANXIOUS: 1
DIZZINESS: 0
ADENOPATHY: 0
HALLUCINATIONS: 0
COUGH: 0
ALLERGIC/IMMUNOLOGIC NEGATIVE: 1

## 2023-05-20 ASSESSMENT — PAIN SCALES - GENERAL
PAINLEVEL_OUTOF10: 4
PAINLEVEL_OUTOF10: 2
PAINLEVEL_OUTOF10: 0
PAINLEVEL_OUTOF10: 0

## 2023-05-20 ASSESSMENT — LIFESTYLE VARIABLES
HOW MANY STANDARD DRINKS CONTAINING ALCOHOL DO YOU HAVE ON A TYPICAL DAY: 0,1 OR 2
AUDIT-C TOTAL SCORE: 1
HOW OFTEN DO YOU HAVE 6 OR MORE DRINKS ON ONE OCCASION: NEVER
HOW OFTEN DO YOU HAVE A DRINK CONTAINING ALCOHOL: MONTHLY OR LESS
ALCOHOL_USE_STATUS: NO OR LOW RISK WITH VALIDATED TOOL

## 2023-05-20 ASSESSMENT — PATIENT HEALTH QUESTIONNAIRE - PHQ9
SUM OF ALL RESPONSES TO PHQ9 QUESTIONS 1 AND 2: 0
IS PATIENT ABLE TO COMPLETE PHQ2 OR PHQ9: NO, DEFER TO LATER TIME
IS PATIENT ABLE TO COMPLETE PHQ2 OR PHQ9: YES
SUM OF ALL RESPONSES TO PHQ9 QUESTIONS 1 AND 2: 0
CLINICAL INTERPRETATION OF PHQ2 SCORE: NO FURTHER SCREENING NEEDED
2. FEELING DOWN, DEPRESSED OR HOPELESS: NOT AT ALL
1. LITTLE INTEREST OR PLEASURE IN DOING THINGS: NOT AT ALL

## 2023-05-20 ASSESSMENT — COLUMBIA-SUICIDE SEVERITY RATING SCALE - C-SSRS: IS THE PATIENT ABLE TO COMPLETE C-SSRS: NO, DEFER TO LATER TIME

## 2023-05-20 ASSESSMENT — PAIN DESCRIPTION - PAIN TYPE: TYPE: ACUTE PAIN

## 2023-05-21 VITALS
WEIGHT: 148.15 LBS | HEART RATE: 74 BPM | BODY MASS INDEX: 29.09 KG/M2 | RESPIRATION RATE: 17 BRPM | TEMPERATURE: 97.5 F | OXYGEN SATURATION: 99 % | HEIGHT: 60 IN | DIASTOLIC BLOOD PRESSURE: 69 MMHG | SYSTOLIC BLOOD PRESSURE: 107 MMHG

## 2023-05-21 LAB
ALBUMIN SERPL-MCNC: 3.2 G/DL (ref 3.6–5.1)
ALBUMIN/GLOB SERPL: 1 {RATIO} (ref 1–2.4)
ALP SERPL-CCNC: 48 UNITS/L (ref 45–117)
ALT SERPL-CCNC: 69 UNITS/L
ANION GAP SERPL CALC-SCNC: 9 MMOL/L (ref 7–19)
AST SERPL-CCNC: 118 UNITS/L
BASOPHILS # BLD: 0.1 K/MCL (ref 0–0.3)
BASOPHILS NFR BLD: 1 %
BILIRUB SERPL-MCNC: 0.5 MG/DL (ref 0.2–1)
BUN SERPL-MCNC: 12 MG/DL (ref 6–20)
BUN/CREAT SERPL: 19 (ref 7–25)
CALCIUM SERPL-MCNC: 8.7 MG/DL (ref 8.4–10.2)
CHLORIDE SERPL-SCNC: 112 MMOL/L (ref 97–110)
CO2 SERPL-SCNC: 23 MMOL/L (ref 21–32)
CREAT SERPL-MCNC: 0.62 MG/DL (ref 0.51–0.95)
DEPRECATED RDW RBC: 47 FL (ref 39–50)
EOSINOPHIL # BLD: 0.2 K/MCL (ref 0–0.5)
EOSINOPHIL NFR BLD: 2 %
ERYTHROCYTE [DISTWIDTH] IN BLOOD: 13.1 % (ref 11–15)
FASTING DURATION TIME PATIENT: ABNORMAL H
GFR SERPLBLD BASED ON 1.73 SQ M-ARVRAT: >90 ML/MIN
GLOBULIN SER-MCNC: 3.3 G/DL (ref 2–4)
GLUCOSE SERPL-MCNC: 106 MG/DL (ref 70–99)
HCT VFR BLD CALC: 38.6 % (ref 36–46.5)
HGB BLD-MCNC: 12.8 G/DL (ref 12–15.5)
IMM GRANULOCYTES # BLD AUTO: 0 K/MCL (ref 0–0.2)
IMM GRANULOCYTES # BLD: 0 %
LYMPHOCYTES # BLD: 1.6 K/MCL (ref 1–4)
LYMPHOCYTES NFR BLD: 23 %
MAGNESIUM SERPL-MCNC: 1.9 MG/DL (ref 1.7–2.4)
MCH RBC QN AUTO: 32.2 PG (ref 26–34)
MCHC RBC AUTO-ENTMCNC: 33.2 G/DL (ref 32–36.5)
MCV RBC AUTO: 97.2 FL (ref 78–100)
MONOCYTES # BLD: 0.8 K/MCL (ref 0.3–0.9)
MONOCYTES NFR BLD: 11 %
NEUTROPHILS # BLD: 4.3 K/MCL (ref 1.8–7.7)
NEUTROPHILS NFR BLD: 63 %
NRBC BLD MANUAL-RTO: 0 /100 WBC
PLATELET # BLD AUTO: 187 K/MCL (ref 140–450)
POTASSIUM SERPL-SCNC: 4.4 MMOL/L (ref 3.4–5.1)
PROT SERPL-MCNC: 6.5 G/DL (ref 6.4–8.2)
RBC # BLD: 3.97 MIL/MCL (ref 4–5.2)
SODIUM SERPL-SCNC: 140 MMOL/L (ref 135–145)
WBC # BLD: 6.9 K/MCL (ref 4.2–11)

## 2023-05-21 PROCEDURE — G0378 HOSPITAL OBSERVATION PER HR: HCPCS

## 2023-05-21 PROCEDURE — 85025 COMPLETE CBC W/AUTO DIFF WBC: CPT | Performed by: HOSPITALIST

## 2023-05-21 PROCEDURE — 83735 ASSAY OF MAGNESIUM: CPT | Performed by: HOSPITALIST

## 2023-05-21 PROCEDURE — 36415 COLL VENOUS BLD VENIPUNCTURE: CPT | Performed by: HOSPITALIST

## 2023-05-21 PROCEDURE — 80053 COMPREHEN METABOLIC PANEL: CPT | Performed by: HOSPITALIST

## 2023-05-21 PROCEDURE — 10002803 HB RX 637: Performed by: HOSPITALIST

## 2023-05-21 PROCEDURE — 10002803 HB RX 637: Performed by: PSYCHIATRY & NEUROLOGY

## 2023-05-21 RX ADMIN — PANTOPRAZOLE SODIUM 40 MG: 40 TABLET, DELAYED RELEASE ORAL at 06:25

## 2023-05-21 RX ADMIN — OXYCODONE HYDROCHLORIDE AND ACETAMINOPHEN 1 TABLET: 10; 325 TABLET ORAL at 11:41

## 2023-05-21 RX ADMIN — GABAPENTIN 300 MG: 300 CAPSULE ORAL at 10:26

## 2023-05-21 RX ADMIN — VALACYCLOVIR HYDROCHLORIDE 500 MG: 500 TABLET, FILM COATED ORAL at 10:26

## 2023-05-21 RX ADMIN — DULOXETINE HYDROCHLORIDE 60 MG: 20 CAPSULE, DELAYED RELEASE ORAL at 10:25

## 2023-05-21 ASSESSMENT — PAIN SCALES - GENERAL
PAINLEVEL_OUTOF10: 7
PAINLEVEL_OUTOF10: 5

## 2023-05-21 ASSESSMENT — PAIN SCALES - WONG BAKER: WONGBAKER_NUMERICALRESPONSE: 2

## 2023-05-27 ENCOUNTER — IMAGING SERVICES (OUTPATIENT)
Dept: OTHER | Age: 63
End: 2023-05-27
Attending: OBSTETRICS & GYNECOLOGY

## 2023-10-12 ENCOUNTER — OFFICE VISIT (OUTPATIENT)
Dept: SURGERY | Facility: CLINIC | Age: 63
End: 2023-10-12
Payer: MEDICARE

## 2023-10-12 VITALS
HEART RATE: 80 BPM | DIASTOLIC BLOOD PRESSURE: 66 MMHG | BODY MASS INDEX: 28.27 KG/M2 | WEIGHT: 144 LBS | SYSTOLIC BLOOD PRESSURE: 124 MMHG | OXYGEN SATURATION: 98 % | HEIGHT: 60 IN

## 2023-10-12 DIAGNOSIS — E66.3 OVERWEIGHT (BMI 25.0-29.9): ICD-10-CM

## 2023-10-12 DIAGNOSIS — R63.2 BINGE EATING: ICD-10-CM

## 2023-10-12 DIAGNOSIS — I10 ESSENTIAL HYPERTENSION: ICD-10-CM

## 2023-10-12 DIAGNOSIS — E78.2 MIXED HYPERLIPIDEMIA: Primary | ICD-10-CM

## 2023-10-12 DIAGNOSIS — Z51.81 ENCOUNTER FOR THERAPEUTIC DRUG MONITORING: ICD-10-CM

## 2023-10-12 PROCEDURE — 99214 OFFICE O/P EST MOD 30 MIN: CPT | Performed by: INTERNAL MEDICINE

## 2023-12-26 ENCOUNTER — TELEPHONE (OUTPATIENT)
Dept: CARDIOLOGY | Age: 63
End: 2023-12-26

## 2023-12-27 PROBLEM — R91.8 PULMONARY NODULES: Status: ACTIVE | Noted: 2023-12-27

## 2023-12-27 ASSESSMENT — ENCOUNTER SYMPTOMS
CHILLS: 0
HEMATOCHEZIA: 0
SUSPICIOUS LESIONS: 0
WEIGHT GAIN: 0
WEIGHT LOSS: 0
BRUISES/BLEEDS EASILY: 0
HEMOPTYSIS: 0
ALLERGIC/IMMUNOLOGIC COMMENTS: NO NEW FOOD ALLERGIES
COUGH: 0
FEVER: 0

## 2023-12-28 ENCOUNTER — OFFICE VISIT (OUTPATIENT)
Dept: CARDIOLOGY | Age: 63
End: 2023-12-28

## 2023-12-28 ENCOUNTER — ANCILLARY PROCEDURE (OUTPATIENT)
Dept: CARDIOLOGY | Age: 63
End: 2023-12-28
Attending: INTERNAL MEDICINE

## 2023-12-28 VITALS
OXYGEN SATURATION: 97 % | HEIGHT: 61 IN | HEART RATE: 76 BPM | DIASTOLIC BLOOD PRESSURE: 93 MMHG | WEIGHT: 144.62 LBS | BODY MASS INDEX: 27.3 KG/M2 | SYSTOLIC BLOOD PRESSURE: 135 MMHG

## 2023-12-28 DIAGNOSIS — E78.00 PURE HYPERCHOLESTEROLEMIA: ICD-10-CM

## 2023-12-28 DIAGNOSIS — I50.32 CHRONIC DIASTOLIC HEART FAILURE (CMD): Primary | ICD-10-CM

## 2023-12-28 DIAGNOSIS — G47.33 OBSTRUCTIVE SLEEP APNEA SYNDROME: ICD-10-CM

## 2023-12-28 DIAGNOSIS — R00.2 PALPITATIONS: ICD-10-CM

## 2023-12-28 DIAGNOSIS — E66.9 OBESITY, UNSPECIFIED CLASSIFICATION, UNSPECIFIED OBESITY TYPE, UNSPECIFIED WHETHER SERIOUS COMORBIDITY PRESENT: ICD-10-CM

## 2023-12-28 DIAGNOSIS — I50.32 CHRONIC DIASTOLIC HEART FAILURE (CMD): ICD-10-CM

## 2023-12-28 DIAGNOSIS — R91.8 PULMONARY NODULES: ICD-10-CM

## 2023-12-28 PROCEDURE — 99214 OFFICE O/P EST MOD 30 MIN: CPT | Performed by: INTERNAL MEDICINE

## 2023-12-28 RX ORDER — DULOXETIN HYDROCHLORIDE 60 MG/1
90 CAPSULE, DELAYED RELEASE ORAL DAILY
COMMUNITY
Start: 2023-08-11

## 2023-12-28 RX ORDER — SULFAMETHOXAZOLE AND TRIMETHOPRIM 800; 160 MG/1; MG/1
1 TABLET ORAL 2 TIMES DAILY
COMMUNITY
Start: 2023-12-19

## 2023-12-28 SDOH — HEALTH STABILITY: PHYSICAL HEALTH: ON AVERAGE, HOW MANY MINUTES DO YOU ENGAGE IN EXERCISE AT THIS LEVEL?: 0 MIN

## 2023-12-28 SDOH — HEALTH STABILITY: MENTAL HEALTH: DEPRESSION SCREENING SCORE: 0

## 2023-12-28 SDOH — HEALTH STABILITY: MENTAL HEALTH: LITTLE INTEREST OR PLEASURE IN ACTIVITY?: NOT AT ALL

## 2023-12-28 SDOH — HEALTH STABILITY: PHYSICAL HEALTH: ON AVERAGE, HOW MANY DAYS PER WEEK DO YOU ENGAGE IN MODERATE TO STRENUOUS EXERCISE (LIKE A BRISK WALK)?: 0 DAYS

## 2023-12-28 SDOH — HEALTH STABILITY: MENTAL HEALTH: PHQ2 INTERPRETATION: NO FURTHER SCREENING NEEDED

## 2023-12-28 SDOH — HEALTH STABILITY: MENTAL HEALTH: FEELING DOWN, DEPRESSED OR HOPELESS?: NOT AT ALL

## 2023-12-28 ASSESSMENT — PATIENT HEALTH QUESTIONNAIRE - PHQ9: SUM OF ALL RESPONSES TO PHQ9 QUESTIONS 1 AND 2: 0

## 2024-01-15 PROCEDURE — 93244 EXT ECG>48HR<7D REV&INTERPJ: CPT | Performed by: INTERNAL MEDICINE

## 2024-01-17 ENCOUNTER — TELEPHONE (OUTPATIENT)
Dept: CARDIOLOGY | Age: 64
End: 2024-01-17

## 2024-01-17 DIAGNOSIS — E78.00 PURE HYPERCHOLESTEROLEMIA: Primary | ICD-10-CM

## 2024-01-17 DIAGNOSIS — I50.32 CHRONIC DIASTOLIC HEART FAILURE (CMD): ICD-10-CM

## 2024-01-17 DIAGNOSIS — R07.2 PRECORDIAL PAIN: ICD-10-CM

## 2024-01-18 RX ORDER — METOPROLOL SUCCINATE 25 MG/1
25 TABLET, EXTENDED RELEASE ORAL DAILY
Qty: 90 TABLET | Refills: 3 | Status: SHIPPED | OUTPATIENT
Start: 2024-01-18

## 2024-01-24 ENCOUNTER — TELEPHONE (OUTPATIENT)
Dept: CARDIOLOGY | Age: 64
End: 2024-01-24

## 2024-02-22 ENCOUNTER — CLINICAL DOCUMENTATION (OUTPATIENT)
Dept: CARDIOLOGY | Age: 64
End: 2024-02-22

## 2024-02-22 ENCOUNTER — ANCILLARY PROCEDURE (OUTPATIENT)
Dept: CARDIOLOGY | Age: 64
End: 2024-02-22
Attending: INTERNAL MEDICINE

## 2024-02-22 VITALS — BODY MASS INDEX: 27.19 KG/M2 | HEIGHT: 61 IN | WEIGHT: 144 LBS

## 2024-02-22 DIAGNOSIS — R07.2 PRECORDIAL PAIN: ICD-10-CM

## 2024-02-22 DIAGNOSIS — I50.32 CHRONIC DIASTOLIC HEART FAILURE (CMD): ICD-10-CM

## 2024-02-22 DIAGNOSIS — E78.00 PURE HYPERCHOLESTEROLEMIA: ICD-10-CM

## 2024-02-22 LAB
HEART RATE RESERVE PREDICTED: 26.28 BPM
LV EF: 48 %
RESTING HR ACHIEVED: 65 BPM
STRESS BASELINE BP: NORMAL MMHG
STRESS PEAK HR: 115 BPM
STRESS PERCENT HR: 74 %
STRESS POST EXERCISE DUR MIN: 4 MIN
STRESS POST PEAK BP: NORMAL MMHG
STRESS TARGET HR: 156 BPM

## 2024-02-22 PROCEDURE — A9502 TC99M TETROFOSMIN: HCPCS | Performed by: INTERNAL MEDICINE

## 2024-02-22 PROCEDURE — 78452 HT MUSCLE IMAGE SPECT MULT: CPT | Performed by: INTERNAL MEDICINE

## 2024-02-22 PROCEDURE — 93015 CV STRESS TEST SUPVJ I&R: CPT | Performed by: INTERNAL MEDICINE

## 2024-02-22 RX ORDER — REGADENOSON 0.08 MG/ML
0.4 INJECTION, SOLUTION INTRAVENOUS ONCE
Status: COMPLETED | OUTPATIENT
Start: 2024-02-22 | End: 2024-02-22

## 2024-02-22 RX ADMIN — REGADENOSON 0.4 MG: 0.08 INJECTION, SOLUTION INTRAVENOUS at 14:00

## 2024-02-22 ASSESSMENT — EXERCISE STRESS TEST
PEAK_RPP: 9638
STAGE_CATEGORIES: 1
STOPPAGE_REASON: PROTOCOL COMPLETE
PEAK_HR: 79
PEAK_BP: 122/62
COMMENTS: REGADENOSON GIVEN
PEAK_BP: 120/60
PEAK_HR: 115
PEAK_RPP: 13110
PEAK_BP: 114/60
PEAK_RPP: 7800
PEAK_BP: 118/80
PEAK_HR: 65
STAGE_CATEGORIES: RECOVERY 2
MPH: 1
STAGE_CATEGORIES: RECOVERY 0
PEAK_BP: 120/68
PEAK_HR: 72
STAGE_CATEGORIES: RESTING
STAGE_CATEGORIES: RECOVERY 1
PEAK_RPP: 8640
PEAK_RPP: 8378
PEAK_HR: 71

## 2024-02-23 ENCOUNTER — TELEPHONE (OUTPATIENT)
Dept: CARDIOLOGY | Age: 64
End: 2024-02-23

## 2024-02-27 ENCOUNTER — EXTERNAL RECORD (OUTPATIENT)
Dept: HEALTH INFORMATION MANAGEMENT | Facility: OTHER | Age: 64
End: 2024-02-27

## 2024-02-27 ENCOUNTER — TELEPHONE (OUTPATIENT)
Dept: CARDIOLOGY | Age: 64
End: 2024-02-27

## 2024-02-29 ENCOUNTER — OFFICE VISIT (OUTPATIENT)
Dept: SURGERY | Facility: CLINIC | Age: 64
End: 2024-02-29
Payer: MEDICARE

## 2024-02-29 VITALS
HEIGHT: 60 IN | BODY MASS INDEX: 27.35 KG/M2 | HEART RATE: 64 BPM | SYSTOLIC BLOOD PRESSURE: 124 MMHG | DIASTOLIC BLOOD PRESSURE: 72 MMHG | OXYGEN SATURATION: 98 % | WEIGHT: 139.31 LBS

## 2024-02-29 DIAGNOSIS — Z51.81 ENCOUNTER FOR THERAPEUTIC DRUG MONITORING: ICD-10-CM

## 2024-02-29 DIAGNOSIS — E78.2 MIXED HYPERLIPIDEMIA: Primary | ICD-10-CM

## 2024-02-29 DIAGNOSIS — E66.3 OVERWEIGHT (BMI 25.0-29.9): ICD-10-CM

## 2024-02-29 DIAGNOSIS — I10 ESSENTIAL HYPERTENSION: ICD-10-CM

## 2024-02-29 DIAGNOSIS — R63.2 BINGE EATING: ICD-10-CM

## 2024-02-29 PROCEDURE — 99214 OFFICE O/P EST MOD 30 MIN: CPT | Performed by: INTERNAL MEDICINE

## 2024-02-29 RX ORDER — METOPROLOL SUCCINATE 25 MG/1
25 TABLET, EXTENDED RELEASE ORAL DAILY
COMMUNITY
Start: 2024-01-18

## 2024-02-29 RX ORDER — ONDANSETRON 4 MG/1
TABLET, ORALLY DISINTEGRATING ORAL
COMMUNITY
Start: 2024-02-20

## 2024-02-29 NOTE — PROGRESS NOTES
[facility]  [address] [facility] [address]  [facility] [address]  Dept: [phone]             Patient:  Vaishali Darling  :      1960  MRN:      [id number]    Chief Complaint:    Chief Complaint   Patient presents with    Follow - Up    Weight Management     Weight check        SUBJECTIVE     History of Present Illness:  Vaishali is being seen today for a follow-up for non surgical weight loss    Past Medical History:   Past Medical History:   Diagnosis Date    Anxiety state, unspecified     Arthritis     Back problem     SPASMS HERNIATED DISC  IN C SPINE    Behcet's disease (HCC)     NEURO, BRAIN, GI  AND SKIN INVOLVEMENT    Binge eating     Carpal tunnel syndrome, bilateral 2015    Cervical disc disease 2015    MRI [facility] 11-15, jw C6-7    CHF (congestive heart failure) ([facility])     Cholelithiasis     CONGESTIVE HEART FAILURE        being controled - has issue with edema in lower extremities    Depression     Diastolic dysfunction     2-D echocardiogram normal 2015 at [facility]    Diverticulosis of colon (without mention of hemorrhage)     Chris-Danlos syndrome type III (HCC)     hypermobile    Fibromyalgia     GERD (gastroesophageal reflux disease) 2014    Glenohumeral arthritis, right 2015    MRI [facility] 11-15; sees Dr. Serrato- ortho    Hammertoe     Hand fracture     L 5th digit    Hearing impairment     bilateral hearing aids    Hepatitis A     LONG AGO    Hepatitis, unspecified     Hep A    High blood pressure     Pt denies    Hypercholesteremia     Hypertension 2015    Hypertriglyceridemia     Impaired glucose tolerance 2015    Migraines     Morbid obesity with BMI of 45.0-49.9, adult (HCC)     Miller's neuroma     Neuropathy     toes and left foot    BRISA on CPAP     Osteoarthritis     generalized    Pancreatic divisum 2015    MRCP 2015    Pneumonia, organism unspecified(486)     Pre-diabetes     Seizure disorder (HCC)      olefactory seizures history not since age 26    Spondylosis of cervical region without myelopathy or radiculopathy 11/21/2015    MRI [facility] 11-15    Unspecified sleep apnea     BIPAP    Visual impairment     glasses        Comorbidities:  SOB/REYES-Improvement?  yes, Back pain-Improvement?  yes, Joint pain-Improvement?  yes, GERD-Improvement?  yes, Hypertension-Improvement?  yes, EAMON-Improvement?  yes and Snoring-Improvement?  yes    OBJECTIVE     Vitals: /72   Pulse 64   Ht 5' (1.524 m)   Wt 139 lb 4.8 oz (63.2 kg)   LMP 01/20/2011   SpO2 98%   BMI 27.21 kg/m²     Initial weight loss: -04   Total weight loss: -119                         Start weight: 258.7    Wt Readings from Last 3 Encounters:   02/29/24 139 lb 4.8 oz (63.2 kg)   10/12/23 144 lb (65.3 kg)   05/18/23 148 lb 9.6 oz (67.4 kg)       Patient Medications:    Current Outpatient Medications   Medication Sig Dispense Refill    metoprolol succinate ER 25 MG Oral Tablet 24 Hr Take 1 tablet (25 mg total) by mouth daily.      ondansetron 4 MG Oral Tablet Dispersible Take 2 tablets (8 mg total) by mouth every 8 hours as needed for Nausea.      ALPRAZolam (XANAX) 1 MG Oral Tab Take 1 tablet (1 mg total) by mouth 2 (two) times daily as needed for Anxiety. 90 tablet 1    DULoxetine 60 MG Oral Cap DR Particles Take 1 capsule (60 mg total) by mouth once daily. 90 capsule 1    traZODone 50 MG Oral Tab TAKE 1 TABLET(50 MG) BY MOUTH at bedtime as needed 90 tablet 1    amphetamine-dextroamphetamine (ADDERALL) 20 MG Oral Tab Take 1 tablet (20 mg total) by mouth 2 (two) times daily. 60 tablet 0    [START ON 3/18/2024] amphetamine-dextroamphetamine (ADDERALL) 20 MG Oral Tab Take 1 tablet (20 mg total) by mouth 2 (two) times daily. 60 tablet 0    [START ON 4/18/2024] amphetamine-dextroamphetamine (ADDERALL) 20 MG Oral Tab Take 1 tablet (20 mg total) by mouth 2 (two) times daily. 60 tablet 0    DULoxetine (CYMBALTA) 30 MG Oral Cap DR Particles Take 1 capsule (30 mg  total) by mouth daily. Take in addition to 60 mg capsule for total daily dose of 90 mg. 90 capsule 1    amphetamine-dextroamphetamine 20 MG Oral Tab Take 1 tablet (20 mg total) by mouth 2 (two) times daily. 60 tablet 0    VALACYCLOVIR 500 MG Oral Tab TAKE ONE TABLET BY MOUTH ONE TIME DAILY 90 tablet 3    CYCLOBENZAPRINE 10 MG Oral Tab Take 1 tablet (10 mg total) by mouth 3 (three) times daily as needed for Muscle spasms. 60 tablet 0    OMEPRAZOLE 40 MG Oral Capsule Delayed Release Take 1 capsule (40 mg total) by mouth daily. 90 capsule 3    GABAPENTIN 300 MG Oral Cap Take 1 capsule (300 mg total) by mouth 2 (two) times daily. 180 capsule 3    BUTALBITAL-ASPIRIN-CAFFEINE -40 MG Oral Cap TAKE 1 CAPSULE BY MOUTH EVERY SIX HOURS AS NEEDED FOR PAIN 50 capsule 0    Clobetasol Propionate 0.05 % External Solution Apply to affected areas of scalp, ears daily as needed 50 mL 2    Wheat Dextrin (BENEFIBER) Oral Powder Use 1 scoop daily in 8 ounces of your desired beverage 1 Can 0    Polyethylene Glycol 3350 (SM CLEARLAX) Oral Powder MIX 17 GRAMS IN LIQUID AND DRINK DAILY 510 g 3    Probiotic Product (PROBIOTIC OR) Take by mouth. (Patient not taking: Reported on 3/11/2022)      PREBIOTIC PRODUCT OR Take by mouth.      FREESTYLE LITE TEST In Vitro Strip TEST SUGARS EVERY MORNING, ALTERNATING WITH 2 HOURS POST PRANDIAL DAILY 150 strip 0    Cholecalciferol (VITAMIN D) 1000 UNITS Oral Tab Take 1 capsule by mouth nightly.         Allergies:  Latex, Tequin [gatifloxacin], Adhesive tape, and Imitrex [sumatriptan]     Social History:    Social History     Socioeconomic History    Marital status:      Spouse name: Not on file    Number of children: Not on file    Years of education: Not on file    Highest education level: Not on file   Occupational History    Not on file   Tobacco Use    Smoking status: Former     Packs/day: 0.50     Years: 5.00     Additional pack years: 0.00     Total pack years: 2.50     Types:  Cigarettes     Quit date: 1991     Years since quittin.1    Smokeless tobacco: Never   Vaping Use    Vaping Use: Never used   Substance and Sexual Activity    Alcohol use: No     Alcohol/week: 0.0 standard drinks of alcohol    Drug use: No    Sexual activity: Not on file   Other Topics Concern    Not on file   Social History Narrative    Not on file     Social Determinants of Health     Financial Resource Strain: Not on file   Food Insecurity: Not on file   Transportation Needs: Not on file   Physical Activity: Not on file   Stress: Not on file   Social Connections: Not on file   Housing Stability: Not on file     Surgical History:    Past Surgical History:   Procedure Laterality Date    APPENDECTOMY      ARTHROSCOPY OF JOINT UNLISTED Bilateral     right shoulder and both knees      1994 x2, 1992 x 1    total of 3    CHOLECYSTECTOMY      COLONOSCOPY      EGD N/A 2020    Procedure: ESOPHAGOGASTRODUODENOSCOPY, COLONOSCOPY, POSSIBLE BIOPSY, POSSIBLE POLYPECTOMY 83214, 97514;  Surgeon: Kwabena Mayer MD;  [facility]    HERNIA SURGERY  10/22/2019    Umb hernia repair/ASc-Dr Adan    OTHER      , and 2014- deborah joint toes/ had bone grafts with stem cells recreated both toes/        OTHER  had numerous surgeries    Great toe replacements removed and then reconstructed     OTHER      reconstructive surgery on left foot with hardware then it was removed    OTHER Right     tkr    OTHER  2018    Revision 5th metatarsal fusion    OTHER SURGICAL HISTORY      BILATERAL TOE joint REPLACEMENT-     OTHER SURGICAL HISTORY      right shoulder aa/dcr/debridement rotator cuff     OTHER SURGICAL HISTORY  2017    L shoulder replacement    OTHER SURGICAL HISTORY      R foot surgery x4    TONSILLECTOMY       Family History:    Family History   Problem Relation Age of Onset    Diabetes Father     Cancer Father         prostate cancer    Lung Disorder Mother         COPD       Food  Journal  Reviewed and Discussed:       Patient has a Food Journal?: yes   Patient is reading nutrition labels?  yes  Average Caloric Intake:     Average CHO Intake: <100  Is patient exercising? yes  Type of exercise? adl's    Eating Habits  Patient states the following:  Eats 3 meal(s) per day  Length of time it takes to consume a meal:  20  # of snacks per day: 1 Type of snacks: black berries  Amount of soda consumption per day:  0  Amount of water (in ounces) per day:  64  Drinking between meals only:  yes  Toughest challenge:  Exercise due to pain    Nutritional Goals  Limit carbohydrates to 100 gms per day, Eat 100-200 calories within 1 hour of waking  and Eat 3-4 cups of fresh fruits or vegetables daily    Behavior Modifications Reviewed and Discussed  Eat breakfast, Eat 3 meals per day, Plan meals in advance, Read nutrition labels, Drink 64 oz of water per day, Maintain a daily food journal, No drinking 30 minutes before or after meals, Utlize portion control strategies to reduce calorie intake, Identify triggers for eating and manage cues and Eat slowly and take 20 to 30 minutes to complete each meal    Exercise Goals Reviewed and Discussed    Walk for  45 Minutes and Chair exercises for  45 Minutes    ROS:    Constitutional: positive for fatigue  Respiratory: positive for dyspnea on exertion  Cardiovascular: negative  Gastrointestinal: positive for constipation  Musculoskeletal:positive for arthralgias and back pain  Neurological: positive for dizziness  Behavioral/Psych: negative  Endocrine: negative  All other systems were reviewed and are negative    Physical Exam:     General appearance: alert, appears stated age and cooperative  Head: Normocephalic, without obvious abnormality, atraumatic  Lungs: clear to auscultation bilaterally  Heart: S1, S2 normal, no murmur, click, rub or gallop, regular rate and rhythm  Abdomen: soft, non-tender; bowel sounds normal; no masses,  no organomegaly  Extremities: edema  trace right 2+ left trace  Pulses: 2+ and symmetric  Skin: clean, dry and intact      ASSESSMENT     HYPERTENSION:  The patient's blood pressure has been well controlled.  she has been checking it as instructed and has remained in relatively good control.    OBSTRUCTIVE SLEEP APNEA: The patient states her sleep apnea has been stable since the last clinic visit. There has not been any increase in hyper-somnolence.     DEPRESSION:  The patient states that her depression has been relatively well controlled.  she denies any recent changes in her irritability, concentration, sleep, libido or mood.  she denies any suicidal ideation.      Encounter Diagnosis(ses):   Encounter Diagnoses   Name Primary?    Mixed hyperlipidemia Yes    Essential hypertension     Binge eating     Encounter for therapeutic drug monitoring     Overweight (BMI 25.0-29.9)        PLAN   No orders of the defined types were placed in this encounter.    Patient is not interested in bariatric surgery. Patient desires to pursue traditional weight loss at this time.      HYPERTENSION: Blood pressure stable on the above medications. No interval change in antihypertensive medication.     Patient was instructed to continue wearing their CPaP as recommended.     Constipation: improving with meds.     Goals for next month:  1. Keep a food log.  2. Drink 48-64 ounces of non-caloric beverages per day. No fruit juices or regular soda.  3. Increase activity-upper body exercises, walk 10 minutes per day.  4. Increase fruit and vegetable servings to 5-6 per day.      Doing well    Has support from      Adderall by psych team (currently on hold due to palpitations)    Being worked up for dizzyness    Recent shoulder surgery      Follow up with pcp    Rosalio Singleton MD

## 2024-03-14 ENCOUNTER — EXTERNAL RECORD (OUTPATIENT)
Dept: OTHER | Age: 64
End: 2024-03-14

## 2024-03-25 ASSESSMENT — ENCOUNTER SYMPTOMS
WEIGHT GAIN: 0
HEMATOCHEZIA: 0
COUGH: 0
HEMOPTYSIS: 0
ALLERGIC/IMMUNOLOGIC COMMENTS: NO NEW FOOD ALLERGIES
SUSPICIOUS LESIONS: 0
WEIGHT LOSS: 0
BRUISES/BLEEDS EASILY: 0
FEVER: 0
CHILLS: 0

## 2024-03-28 ENCOUNTER — OFFICE VISIT (OUTPATIENT)
Dept: CARDIOLOGY | Age: 64
End: 2024-03-28

## 2024-03-28 VITALS
HEART RATE: 83 BPM | HEIGHT: 61 IN | DIASTOLIC BLOOD PRESSURE: 90 MMHG | SYSTOLIC BLOOD PRESSURE: 154 MMHG | WEIGHT: 145.72 LBS | BODY MASS INDEX: 27.51 KG/M2 | OXYGEN SATURATION: 99 %

## 2024-03-28 DIAGNOSIS — E78.00 PURE HYPERCHOLESTEROLEMIA: ICD-10-CM

## 2024-03-28 DIAGNOSIS — G47.33 OBSTRUCTIVE SLEEP APNEA SYNDROME: ICD-10-CM

## 2024-03-28 DIAGNOSIS — I50.32 CHRONIC DIASTOLIC HEART FAILURE (CMD): Primary | ICD-10-CM

## 2024-03-28 DIAGNOSIS — M35.2 BEHCET'S DISEASE (CMD): ICD-10-CM

## 2024-03-28 DIAGNOSIS — R91.8 PULMONARY NODULES: ICD-10-CM

## 2024-03-28 ASSESSMENT — PATIENT HEALTH QUESTIONNAIRE - PHQ9
CLINICAL INTERPRETATION OF PHQ2 SCORE: NO FURTHER SCREENING NEEDED
2. FEELING DOWN, DEPRESSED OR HOPELESS: NOT AT ALL
1. LITTLE INTEREST OR PLEASURE IN DOING THINGS: NOT AT ALL
SUM OF ALL RESPONSES TO PHQ9 QUESTIONS 1 AND 2: 0
SUM OF ALL RESPONSES TO PHQ9 QUESTIONS 1 AND 2: 0

## 2024-04-23 ENCOUNTER — INCIDENTAL FINDING (OUTPATIENT)
Dept: GENERAL RADIOLOGY | Age: 64
End: 2024-04-23

## 2024-04-24 DIAGNOSIS — R06.00 DYSPNEA: Primary | ICD-10-CM

## 2024-04-24 DIAGNOSIS — M48.9 CERVICAL SPINE DISEASE: ICD-10-CM

## 2024-05-07 ENCOUNTER — APPOINTMENT (OUTPATIENT)
Dept: RESPIRATORY THERAPY | Age: 64
End: 2024-05-07
Attending: INTERNAL MEDICINE

## 2024-05-09 ENCOUNTER — HOSPITAL ENCOUNTER (OUTPATIENT)
Dept: RESPIRATORY THERAPY | Age: 64
End: 2024-05-09
Attending: INTERNAL MEDICINE

## 2024-05-13 ENCOUNTER — HOSPITAL ENCOUNTER (OUTPATIENT)
Dept: GENERAL RADIOLOGY | Age: 64
Discharge: HOME | End: 2024-05-13
Attending: INTERNAL MEDICINE

## 2024-05-13 ENCOUNTER — HOSPITAL ENCOUNTER (OUTPATIENT)
Dept: RESPIRATORY THERAPY | Age: 64
Discharge: HOME | End: 2024-05-13
Attending: INTERNAL MEDICINE

## 2024-05-13 DIAGNOSIS — R06.00 DYSPNEA: ICD-10-CM

## 2024-05-13 DIAGNOSIS — M48.9 CERVICAL SPINE DISEASE: ICD-10-CM

## 2024-05-13 PROCEDURE — 71046 X-RAY EXAM CHEST 2 VIEWS: CPT

## 2024-05-13 PROCEDURE — 94060 EVALUATION OF WHEEZING: CPT

## 2024-05-13 PROCEDURE — 10002801 HB RX 250 W/O HCPCS

## 2024-05-13 PROCEDURE — 94729 DIFFUSING CAPACITY: CPT

## 2024-05-13 PROCEDURE — 94726 PLETHYSMOGRAPHY LUNG VOLUMES: CPT

## 2024-05-13 RX ORDER — ALBUTEROL SULFATE 2.5 MG/3ML
SOLUTION RESPIRATORY (INHALATION)
Status: COMPLETED
Start: 2024-05-13 | End: 2024-05-13

## 2024-05-13 RX ADMIN — ALBUTEROL SULFATE 2.5 MG: 2.5 SOLUTION RESPIRATORY (INHALATION) at 08:51

## 2024-05-16 DIAGNOSIS — Z12.31 ENCOUNTER FOR MAMMOGRAM TO ESTABLISH BASELINE MAMMOGRAM: Primary | ICD-10-CM

## 2024-05-28 ENCOUNTER — APPOINTMENT (OUTPATIENT)
Dept: CARDIOLOGY | Age: 64
End: 2024-05-28

## 2024-05-28 ASSESSMENT — ENCOUNTER SYMPTOMS
WEIGHT LOSS: 0
ALLERGIC/IMMUNOLOGIC COMMENTS: NO NEW FOOD ALLERGIES
FEVER: 0
SUSPICIOUS LESIONS: 0
CHILLS: 0
COUGH: 0
HEMATOCHEZIA: 0
WEIGHT GAIN: 0
HEMOPTYSIS: 0
BRUISES/BLEEDS EASILY: 0

## 2024-05-30 ENCOUNTER — APPOINTMENT (OUTPATIENT)
Dept: CARDIOLOGY | Age: 64
End: 2024-05-30

## 2024-05-30 VITALS
WEIGHT: 142.53 LBS | BODY MASS INDEX: 26.93 KG/M2 | HEART RATE: 72 BPM | DIASTOLIC BLOOD PRESSURE: 60 MMHG | SYSTOLIC BLOOD PRESSURE: 128 MMHG

## 2024-05-30 DIAGNOSIS — R91.8 PULMONARY NODULES: ICD-10-CM

## 2024-05-30 DIAGNOSIS — I50.32 CHRONIC DIASTOLIC HEART FAILURE  (CMD): ICD-10-CM

## 2024-05-30 DIAGNOSIS — M35.2 BEHCET'S DISEASE  (CMD): ICD-10-CM

## 2024-05-30 DIAGNOSIS — E78.00 PURE HYPERCHOLESTEROLEMIA: Primary | ICD-10-CM

## 2024-05-30 DIAGNOSIS — G47.33 OBSTRUCTIVE SLEEP APNEA SYNDROME: ICD-10-CM

## 2024-05-30 RX ORDER — LACOSAMIDE 200 MG/1
200 TABLET ORAL 2 TIMES DAILY
COMMUNITY

## 2024-05-30 RX ORDER — ASPIRIN 81 MG/1
TABLET, CHEWABLE ORAL DAILY
COMMUNITY

## 2024-05-30 RX ORDER — LAMOTRIGINE 150 MG/1
TABLET ORAL
COMMUNITY

## 2024-05-30 RX ORDER — ATORVASTATIN CALCIUM 80 MG/1
80 TABLET, FILM COATED ORAL DAILY
COMMUNITY

## 2024-05-31 ENCOUNTER — CLINICAL ABSTRACT (OUTPATIENT)
Dept: OTHER | Age: 64
End: 2024-05-31

## 2024-06-10 ENCOUNTER — INCIDENTAL FINDING (OUTPATIENT)
Dept: GENERAL RADIOLOGY | Age: 64
End: 2024-06-10

## 2024-06-17 ENCOUNTER — HOSPITAL ENCOUNTER (OUTPATIENT)
Dept: CT IMAGING | Age: 64
Discharge: HOME | End: 2024-06-17
Attending: OBSTETRICS & GYNECOLOGY

## 2024-06-17 DIAGNOSIS — Z12.31 ENCOUNTER FOR MAMMOGRAM TO ESTABLISH BASELINE MAMMOGRAM: ICD-10-CM

## 2024-06-17 PROCEDURE — 77063 BREAST TOMOSYNTHESIS BI: CPT

## 2024-06-25 ENCOUNTER — CLINICAL DOCUMENTATION (OUTPATIENT)
Dept: CT IMAGING | Age: 64
End: 2024-06-25

## 2024-07-03 ENCOUNTER — TELEPHONE (OUTPATIENT)
Dept: CARDIOLOGY | Age: 64
End: 2024-07-03

## 2024-07-09 ASSESSMENT — ENCOUNTER SYMPTOMS
COUGH: 0
FEVER: 0
ALLERGIC/IMMUNOLOGIC COMMENTS: NO NEW FOOD ALLERGIES
HEMATOCHEZIA: 0
WEIGHT GAIN: 0
HEMOPTYSIS: 0
BRUISES/BLEEDS EASILY: 0
CHILLS: 0
WEIGHT LOSS: 0
SUSPICIOUS LESIONS: 0

## 2024-07-10 ENCOUNTER — TELEPHONE (OUTPATIENT)
Dept: CARDIOLOGY | Age: 64
End: 2024-07-10

## 2024-07-10 ENCOUNTER — TELEPHONE (OUTPATIENT)
Age: 64
End: 2024-07-10

## 2024-07-10 ASSESSMENT — PATIENT HEALTH QUESTIONNAIRE - PHQ9
1. LITTLE INTEREST OR PLEASURE IN DOING THINGS: NOT AT ALL
CLINICAL INTERPRETATION OF PHQ2 SCORE: NO FURTHER SCREENING NEEDED
2. FEELING DOWN, DEPRESSED OR HOPELESS: NOT AT ALL
SUM OF ALL RESPONSES TO PHQ9 QUESTIONS 1 AND 2: 0
SUM OF ALL RESPONSES TO PHQ9 QUESTIONS 1 AND 2: 0

## 2024-07-11 ENCOUNTER — APPOINTMENT (OUTPATIENT)
Dept: CARDIOLOGY | Age: 64
End: 2024-07-11

## 2024-07-11 DIAGNOSIS — M35.2 BEHCET'S DISEASE  (CMD): ICD-10-CM

## 2024-07-11 DIAGNOSIS — E78.00 PURE HYPERCHOLESTEROLEMIA: Primary | ICD-10-CM

## 2024-07-11 DIAGNOSIS — I50.32 CHRONIC DIASTOLIC HEART FAILURE  (CMD): ICD-10-CM

## 2024-07-11 DIAGNOSIS — G47.33 OBSTRUCTIVE SLEEP APNEA SYNDROME: ICD-10-CM

## 2024-07-11 PROBLEM — F33.2 MDD (MAJOR DEPRESSIVE DISORDER), RECURRENT EPISODE, SEVERE (HCC): Status: ACTIVE | Noted: 2024-07-11

## 2024-07-11 PROBLEM — F33.2 SEVERE EPISODE OF RECURRENT MAJOR DEPRESSIVE DISORDER, WITHOUT PSYCHOTIC FEATURES (HCC): Status: ACTIVE | Noted: 2024-07-11

## 2024-07-20 ENCOUNTER — HOSPITAL ENCOUNTER (EMERGENCY)
Facility: HOSPITAL | Age: 64
Discharge: HOME | End: 2024-07-21
Attending: EMERGENCY MEDICINE
Payer: MEDICARE

## 2024-07-20 ENCOUNTER — APPOINTMENT (OUTPATIENT)
Dept: GENERAL RADIOLOGY | Facility: HOSPITAL | Age: 64
End: 2024-07-20
Attending: EMERGENCY MEDICINE
Payer: MEDICARE

## 2024-07-20 DIAGNOSIS — K59.00 CONSTIPATION, UNSPECIFIED CONSTIPATION TYPE: Primary | ICD-10-CM

## 2024-07-20 LAB
ALBUMIN SERPL-MCNC: 4.5 G/DL (ref 3.2–4.8)
ALBUMIN/GLOB SERPL: 1.9 {RATIO} (ref 1–2)
ALP LIVER SERPL-CCNC: 51 U/L
ALT SERPL-CCNC: 28 U/L
ANION GAP SERPL CALC-SCNC: 5 MMOL/L (ref 0–18)
AST SERPL-CCNC: 36 U/L (ref ?–34)
BASOPHILS # BLD AUTO: 0.06 X10(3) UL (ref 0–0.2)
BASOPHILS NFR BLD AUTO: 0.9 %
BILIRUB SERPL-MCNC: 0.2 MG/DL (ref 0.2–1.1)
BILIRUB UR QL STRIP.AUTO: NEGATIVE
BUN BLD-MCNC: 12 MG/DL (ref 9–23)
CALCIUM BLD-MCNC: 9.1 MG/DL (ref 8.7–10.4)
CHLORIDE SERPL-SCNC: 103 MMOL/L (ref 98–112)
CLARITY UR REFRACT.AUTO: CLEAR
CO2 SERPL-SCNC: 23 MMOL/L (ref 21–32)
COCAINE UR QL: NEGATIVE
CREAT BLD-MCNC: 0.77 MG/DL
CREAT UR-SCNC: 33.2 MG/DL
EGFRCR SERPLBLD CKD-EPI 2021: 86 ML/MIN/1.73M2 (ref 60–?)
EOSINOPHIL # BLD AUTO: 0.24 X10(3) UL (ref 0–0.7)
EOSINOPHIL NFR BLD AUTO: 3.8 %
ERYTHROCYTE [DISTWIDTH] IN BLOOD BY AUTOMATED COUNT: 11.7 %
ETHANOL SERPL-MCNC: <3 MG/DL (ref ?–3)
FENTANYL UR QL SCN: NEGATIVE
FLUAV + FLUBV RNA SPEC NAA+PROBE: NEGATIVE
FLUAV + FLUBV RNA SPEC NAA+PROBE: NEGATIVE
GLOBULIN PLAS-MCNC: 2.4 G/DL (ref 2.8–4.4)
GLUCOSE BLD-MCNC: 89 MG/DL (ref 70–99)
GLUCOSE UR STRIP.AUTO-MCNC: NORMAL MG/DL
HCT VFR BLD AUTO: 38 %
HGB BLD-MCNC: 13.4 G/DL
IMM GRANULOCYTES # BLD AUTO: 0.01 X10(3) UL (ref 0–1)
IMM GRANULOCYTES NFR BLD: 0.2 %
KETONES UR STRIP.AUTO-MCNC: NEGATIVE MG/DL
LEUKOCYTE ESTERASE UR QL STRIP.AUTO: NEGATIVE
LYMPHOCYTES # BLD AUTO: 1.39 X10(3) UL (ref 1–4)
LYMPHOCYTES NFR BLD AUTO: 21.8 %
MCH RBC QN AUTO: 33 PG (ref 26–34)
MCHC RBC AUTO-ENTMCNC: 35.3 G/DL (ref 31–37)
MCV RBC AUTO: 93.6 FL
MDMA UR QL SCN: NEGATIVE
MONOCYTES # BLD AUTO: 0.68 X10(3) UL (ref 0.1–1)
MONOCYTES NFR BLD AUTO: 10.7 %
NEUTROPHILS # BLD AUTO: 3.99 X10 (3) UL (ref 1.5–7.7)
NEUTROPHILS # BLD AUTO: 3.99 X10(3) UL (ref 1.5–7.7)
NEUTROPHILS NFR BLD AUTO: 62.6 %
NITRITE UR QL STRIP.AUTO: NEGATIVE
OPIATES UR QL SCN: NEGATIVE
OSMOLALITY SERPL CALC.SUM OF ELEC: 271 MOSM/KG (ref 275–295)
PH UR STRIP.AUTO: 5 [PH] (ref 5–8)
PLATELET # BLD AUTO: 191 10(3)UL (ref 150–450)
POTASSIUM SERPL-SCNC: 4.3 MMOL/L (ref 3.5–5.1)
PROT SERPL-MCNC: 6.9 G/DL (ref 5.7–8.2)
PROT UR STRIP.AUTO-MCNC: NEGATIVE MG/DL
RBC # BLD AUTO: 4.06 X10(6)UL
RBC UR QL AUTO: NEGATIVE
RSV RNA SPEC NAA+PROBE: NEGATIVE
SARS-COV-2 RNA RESP QL NAA+PROBE: NOT DETECTED
SODIUM SERPL-SCNC: 131 MMOL/L (ref 136–145)
SP GR UR STRIP.AUTO: 1.01 (ref 1–1.03)
UROBILINOGEN UR STRIP.AUTO-MCNC: NORMAL MG/DL
WBC # BLD AUTO: 6.4 X10(3) UL (ref 4–11)

## 2024-07-20 PROCEDURE — 81003 URINALYSIS AUTO W/O SCOPE: CPT | Performed by: EMERGENCY MEDICINE

## 2024-07-20 PROCEDURE — 80053 COMPREHEN METABOLIC PANEL: CPT | Performed by: EMERGENCY MEDICINE

## 2024-07-20 PROCEDURE — 0241U SARS-COV-2/FLU A AND B/RSV BY PCR (GENEXPERT): CPT | Performed by: EMERGENCY MEDICINE

## 2024-07-20 PROCEDURE — 82077 ASSAY SPEC XCP UR&BREATH IA: CPT | Performed by: EMERGENCY MEDICINE

## 2024-07-20 PROCEDURE — 85025 COMPLETE CBC W/AUTO DIFF WBC: CPT | Performed by: EMERGENCY MEDICINE

## 2024-07-20 PROCEDURE — 80307 DRUG TEST PRSMV CHEM ANLYZR: CPT | Performed by: EMERGENCY MEDICINE

## 2024-07-20 PROCEDURE — 36415 COLL VENOUS BLD VENIPUNCTURE: CPT

## 2024-07-20 PROCEDURE — 99285 EMERGENCY DEPT VISIT HI MDM: CPT

## 2024-07-20 PROCEDURE — 74019 RADEX ABDOMEN 2 VIEWS: CPT | Performed by: EMERGENCY MEDICINE

## 2024-07-20 RX ORDER — ACETAMINOPHEN 500 MG
TABLET ORAL
Status: COMPLETED
Start: 2024-07-20 | End: 2024-07-20

## 2024-07-20 RX ORDER — LACOSAMIDE 200 MG/1
200 TABLET ORAL ONCE
Status: COMPLETED | OUTPATIENT
Start: 2024-07-20 | End: 2024-07-20

## 2024-07-20 RX ORDER — ACETAMINOPHEN 500 MG
1000 TABLET ORAL ONCE
Status: COMPLETED | OUTPATIENT
Start: 2024-07-20 | End: 2024-07-20

## 2024-07-20 RX ORDER — LAMOTRIGINE 100 MG/1
150 TABLET ORAL ONCE
Status: COMPLETED | OUTPATIENT
Start: 2024-07-20 | End: 2024-07-20

## 2024-07-20 NOTE — ED INITIAL ASSESSMENT (HPI)
To [facility] Went home afterwards, and \"it was so hard\". I know I need to go to [facility]\".     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 No bowel movements, and My abdomen is getting bigger and they gave me an enema and I did poop some hard stuff and you know, it wasn't normal. I am in so much pain, in my abdomen but in my lumbar and sacral spine it si so bad, I have spinal instability - I am in so much pain and I don't know what to say to you.\"    \"I have chronic anxiety and depression, since I was in my 20s\"

## 2024-07-20 NOTE — ED QUICK NOTES
Patient jerking/shaking in wheelchair, random sporadic movements. States this is new for her and might be d/t medication changes.

## 2024-07-20 NOTE — ED PROVIDER NOTES
Patient Seen in: [facility] Emergency Department      History     Chief Complaint   Patient presents with    Abdomen/Flank Pain    Psychiatric Problem     Stated Complaint: ABD PAIN    Subjective:   HPI    Ms. Darling presents with a history of seizures. She first experienced focal seizures in the olfactory area during her college years. After a period of no seizures, she had two grand mal seizures in [place] in March. Over the past year, she started experiencing convulsions, which she could understand and communicate during. After these tremors, she would become aphasic until she recovered. She was admitted to an epilepsy clinic at [facility] in May. Here, they discontinued her Lamictal medication, after which she experienced seven grand mal seizures. It was also discovered that she was having seizures in her sleep. The seizures were found to be originating from her cerebellum. Her MRI did not show any acute CVA. She was discharged on Lamictal and lacosamide. After her discharge, she was admitted to [facility] for about ten days. She was then enrolled in a day program at [facility] [place] immediately after her discharge from [facility]. During this time, she was overwhelmed with the number of people seeing her and the need for physical, occupational, and speech therapy.  She has become weak during all of this time and still has issues with abnormal movements of her extremities and difficulty walking. She feels that perhaps she was discharged prematurely. She denies suicidal ideations. She also reports difficulty with bowel movements, not having had one since her discharge from the hospital despite being given two enemas.    Objective:   Past Medical History:    Anxiety state, unspecified    Arthritis    Back problem    SPASMS HERNIATED DISC  IN C SPINE    Behcet's disease (HCC)    NEURO, BRAIN, GI  AND SKIN INVOLVEMENT    Binge eating    Carpal tunnel syndrome, bilateral    Cervical disc disease    MRI [facility]  11-15, jw C6-7    CHF (congestive heart failure) (HCC)    Cholelithiasis    CONGESTIVE HEART FAILURE       being controled - has issue with edema in lower extremities    Depression    Diastolic dysfunction    2-D echocardiogram normal 2015 at [facility]    Diverticulosis of colon (without mention of hemorrhage)    Chris-Danlos syndrome type III (HCC)    hypermobile    Fibromyalgia    GERD (gastroesophageal reflux disease)    Glenohumeral arthritis, right    MRI [facility] 11-15; sees Dr. Serrato- ortho    Hammertoe    Hand fracture    L 5th digit    Hearing impairment    bilateral hearing aids    Hepatitis A    LONG AGO    Hepatitis, unspecified    Hep A    High blood pressure    Pt denies    Hypercholesteremia    Hypertension    Hypertriglyceridemia    Impaired glucose tolerance    Migraines    Morbid obesity with BMI of 45.0-49.9, adult (HCC)    Miller's neuroma    Neuropathy    toes and left foot    BRISA on CPAP    Osteoarthritis    generalized    Pancreatic divisum    MRCP 2015    Pneumonia, organism unspecified(486)    Pre-diabetes    Seizure disorder (HCC)    olefactory seizures history not since age 26    Spondylosis of cervical region without myelopathy or radiculopathy    MRI [facility] 11-15    Unspecified sleep apnea    BIPAP    Visual impairment    glasses              Past Surgical History:   Procedure Laterality Date    Appendectomy      Arthroscopy of joint unlisted Bilateral     right shoulder and both knees      1994 x2, 1992 x 1    total of 3    Cholecystectomy      Colonoscopy      Egd N/A 2020    Procedure: ESOPHAGOGASTRODUODENOSCOPY, COLONOSCOPY, POSSIBLE BIOPSY, POSSIBLE POLYPECTOMY 52539, 23659;  Surgeon: Kwabena Mayer MD;  [place] [facility] [place] [facility]    Hernia surgery  10/22/2019    Umb hernia repair/ASc-Dr Adan    Other      , and 2014- deborah joint toes/ had bone grafts with stem cells recreated both toes/        Other  had numerous surgeries    Great toe  replacements removed and then reconstructed     Other      reconstructive surgery on left foot with hardware then it was removed    Other Right     tkr    Other  2018    Revision 5th metatarsal fusion    Other surgical history      BILATERAL TOE joint REPLACEMENT-     Other surgical history      right shoulder aa/dcr/debridement rotator cuff     Other surgical history  2017    L shoulder replacement    Other surgical history      R foot surgery x4    Tonsillectomy                  Social History     Socioeconomic History    Marital status:    Tobacco Use    Smoking status: Former     Current packs/day: 0.00     Average packs/day: 0.5 packs/day for 5.0 years (2.5 ttl pk-yrs)     Types: Cigarettes     Start date: 1986     Quit date: 1991     Years since quittin.5    Smokeless tobacco: Never   Vaping Use    Vaping status: Never Used   Substance and Sexual Activity    Alcohol use: No     Alcohol/week: 0.0 standard drinks of alcohol    Drug use: No     Social Determinants of Health     Financial Resource Strain: Low Risk  (2024)    Financial Resource Strain     Med Affordability: No   Food Insecurity: No Food Insecurity (2024)    Food Insecurity     Food Insecurity: Never true   Transportation Needs: No Transportation Needs (2024)    Transportation Needs     Lack of Transportation: No   Physical Activity: High Risk (2023)    [facility] from [employer]    Exercise Vital Sign     On average, how many days per week do you engage in moderate to strenuous exercise (like a brisk walk)?: 0 days     On average, how many minutes do you engage in exercise at this level?: 0 min   Stress: Stress Concern Present (1/3/2022)    Received from [facility] - Occupational Stress Questionnaire     Feeling of Stress : To some extent   Social Connections: Low Risk  (2023)    Received from [employer],  [facility]    Social Connections     How often do you see or talk to people that you care about and feel close to? (For example: talking to friends on the phone, visiting friends or family, going to Zoroastrian or club meetings): 5 or more times a week   Housing Stability: Low Risk  (7/11/2024)    Housing Stability     Housing Instability: No              Review of Systems    Positive for stated Chief Complaint: Abdomen/Flank Pain and Psychiatric Problem    Other systems are as noted in HPI.  Constitutional and vital signs reviewed.      All other systems reviewed and negative except as noted above.    Physical Exam     ED Triage Vitals [07/20/24 1719]   BP (!) 148/95   Pulse 89   Resp 24   Temp 98.9 °F (37.2 °C)   Temp src Temporal   SpO2 99 %   O2 Device None (Room air)       Current Vitals:   Vital Signs  BP: 133/89  Pulse: 56  Resp: 17  Temp: 98.9 °F (37.2 °C)  Temp src: Temporal  MAP (mmHg): (!) 104    Oxygen Therapy  SpO2: 99 %  O2 Device: None (Room air)            Physical Exam    General: Alert and oriented x3 in no acute distress.  HEENT: Normocephalic, atraumatic, pupils equal round and reactive to light.  Neck: Supple.  Cardiovascular: Regular rate and rhythm.  Respiratory: Lungs clear to auscultation.  Abdomen: Soft, did, no focal tenderness, no rebound or guarding, normal active bowel sounds, no CVA tenderness.  Extremities: No CCE.  Skin: Warm and dry.  Neurologic: Cranial nerves intact.  Strength 5/5 in all extremities.  Sensory exam grossly intact.    ED Course     Labs Reviewed   COMP METABOLIC PANEL (14) - Abnormal; Notable for the following components:       Result Value    Sodium 131 (*)     Calculated Osmolality 271 (*)     AST 36 (*)     Globulin  2.4 (*)     All other components within normal limits   DRUG SCREEN 8 W/OUT CONFIRMATION, URINE - Abnormal; Notable for the following components:    Amphetamine Urine Presumed Positive (*)     Benzodiazepines Urine Presumed Positive (*)      Cannabinoid Urine Presumed Positive (*)     Oxycodone Urine Presumed Positive (*)     All other components within normal limits    Narrative:     Results of the Urine Drug Screen should be used only for medical purposes.   ETHYL ALCOHOL - Normal   SARS-COV-2/FLU A AND B/RSV BY PCR (GENEXPERT) - Normal    Narrative:     This test is intended for the qualitative detection and differentiation of SARS-CoV-2, influenza A, influenza B, and respiratory syncytial virus (RSV) viral RNA in nasopharyngeal or nares swabs from individuals suspected of respiratory viral infection consistent with COVID-19 by their healthcare provider. Signs and symptoms of respiratory viral infection due to SARS-CoV-2, influenza, and RSV can be similar.    Test performed using the Xpert Xpress SARS-CoV-2/FLU/RSV (real time RT-PCR)  assay on the ProNervepert instrument, [employer], [place] [address].   This test is being used under the Food and Drug Administration's Emergency Use Authorization.    The authorized Fact Sheet for Healthcare Providers for this assay is available upon request from the laboratory.   CBC WITH DIFFERENTIAL WITH PLATELET    Narrative:     The following orders were created for panel order CBC With Differential With Platelet.  Procedure                               Abnormality         Status                     ---------                               -----------         ------                     CBC W/ DIFFERENTIAL[[id number]]                              Final result                 Please view results for these tests on the individual orders.   URINALYSIS, ROUTINE   CBC W/ DIFFERENTIAL     I personally reviewed the abdomen films and constipation with nonspecific bowel gas pattern noted.        XR ABDOMEN OBSTRUCTIVE SERIES ROUTINE(2 VW)(CPT=74019)    Result Date: 7/20/2024  PROCEDURE:  XR ABDOMEN OBSTRUCTIVE SERIES ROUTINE(2 VW)(CPT=74019)  TECHNIQUE:  2 view obstructive series of the abdomen and pelvis were obtained.   COMPARISON:  None.  INDICATIONS:  ABD PAIN  PATIENT STATED HISTORY: (As transcribed by Technologist)  Patient  states that his wife has been constipated for a week.    FINDINGS:  The bowel gas pattern is nonspecific.  There is scattered nonspecific air-fluid levels.  No free intra-abdominal air is identified.  There is a large amount of stool within the lower right abdomen.  There is a moderate amount of stool within the left abdomen.             CONCLUSION:  See above.   LOCATION:  [facility]    Dictated by (CST): Stromberg, LeRoy, MD on 7/20/2024 at 8:45 PM     Finalized by (CST): Stromberg, LeRoy, MD on 7/20/2024 at 8:46 PM        Medications   lamoTRIgine (LaMICtal) tab 150 mg (150 mg Oral Given 7/20/24 2148)   lacosamide (Vimpat) tab 200 mg (200 mg Oral Given 7/20/24 2243)   acetaminophen (Tylenol Extra Strength) tab 1,000 mg (1,000 mg Oral Given 7/20/24 2148)     Patient was given Tylenol per her request and her regular seizure medication she was due for.  She was given a soapsuds enema with not much relief.       MDM      Patient presents with constipation, body twitching and depression.  Differential diagnosis includes but is not limited to constipation, bowel obstruction and electrolyte abnormalities.  The patient does have significant constipation but a benign abdominal exam.  She does not have a fever or leukocytosis.  Her UA is unremarkable.  Her chemistry panel revealed a mildly low sodium and was otherwise reassuring.  The patient was evaluated by crisis who discussed her case with her psychiatrist who is familiar with her case.  They do not believe that she needs to go back into a higher level of care but should continue with her outpatient program.  They counseled her regarding this.  The patient has taken MiraLAX and fleets enemas for her constipation.  I agreed to write her for a bowel prep.  She was counseled regarding returning if her symptoms are worsening.  She expressed understanding.  She  will follow-up with her neurologist at [facility] regarding her seizure medications and body twitching.  She has not had any seizure activity while here in the emergency department.                           Medical Decision Making      Disposition and Plan     Clinical Impression:  1. Constipation, unspecified constipation type         Disposition:  Discharge  7/21/2024 12:02 am    Follow-up:  Kyung Girard MD  [address] [place] [address] [phone]    Call in 2 day(s)            Medications Prescribed:  Discharge Medication List as of 7/21/2024 12:05 AM        START taking these medications    Details   polyethylene glycol, PEG 3350-KCl-NaBcb-NaCl-NaSulf, 236 g Oral Recon Soln Take 4,000 mL by mouth once for 1 dose., Normal, Disp-4000 mL, R-0

## 2024-07-21 VITALS
RESPIRATION RATE: 17 BRPM | DIASTOLIC BLOOD PRESSURE: 89 MMHG | SYSTOLIC BLOOD PRESSURE: 133 MMHG | OXYGEN SATURATION: 99 % | TEMPERATURE: 99 F | HEART RATE: 56 BPM

## 2024-07-21 RX ORDER — MAGNESIUM CARB/ALUMINUM HYDROX 105-160MG
296 TABLET,CHEWABLE ORAL ONCE
Status: DISCONTINUED | OUTPATIENT
Start: 2024-07-21 | End: 2024-07-21

## 2024-07-21 NOTE — BH LEVEL OF CARE ASSESSMENT
Crisis Evaluation Assessment    Vaishali Darling YOB: 1960   Age 64 year old MRN [id number]   [facility] EMERGENCY DEPARTMENT Attending Tanesha Hayes MD      Patient's legal sex: female  Patient identifies as: female  Patient's birth sex: female  Preferred pronouns: she/her    Date of Service: 7/20/2024    Referral Source:  Referral Source  Where was crisis eval performed?: On-site  Referral Source: Friend/Relative  Referral Source Info: Pt's .    Reason for Crisis Evaluation   Patient reports she is in the ED due to not having a bowel movement in a long time.  Patient reports she suffers from chronic depression and anxiety.  Patient reports she has had many lifestyle changes recently due to illness.            Collateral  Patient's  was present at bedside and throughout the assessment.  Patient's  reports patient is not doing as well as she did while admitted to [facility].  He reports the patient is having episodes of crying.           Suicide Crisis Syndrome:  Suicide Crisis Syndrome  Do you feel trapped with no good options left?: No  Are you overwhelmed, or have you lost control by negative thoughts filling your head? : No    Suicide Risk Screening:  Source of information for CSSR: Patient  In what setting is the screener performed?: in person  1. Have you wished you were dead or wished you could go to sleep and not wake up? (past 30 days): No  2. Have you actually had any thoughts of killing yourself? (past 30 days): No              6. Have you ever done anything, started to do anything, or prepared to do anything to end your life? (lifetime): No     Score - BH OV: No Risk    Suicide Risk Assessments:  Suicidal Thoughts, Plan and Intent (this information to be used in conjunction with CSSR-S Suicide Screening)  Describe thoughts, ideation and intent:: Patient denies SI.  Identify Risk Factors  Do you have access to lethal methods to attempt suicide?: No      Risk Stratification  Risk Level: Low       Patient denies any suicidal ideations, plans, or intentions.  Patient denies any previous attempts.          Non-Suicidal Self-Injury:   Patient denies any current/history of self-injurious behavior.        Risk to Others  Patient denies any aggression or homicidal ideations.  Patient identifies her family as her protective factors.        Access to Means:  Access to Means  Has access to means to attempt suicide, self-injure, harm others, or damage property?: No  Access to Firearm/Weapon: No  Do you have a firearm owner identification (FOID) card?: No    Protective Factors:    Patient identifies her family as protective factors.    Review of Psychiatric Systems:  Patient denies any AVH 's, but does report having double vision today.  Patient reports she does experience depression in the form of hopelessness, sadness, and episodes of crying.  Patient reports she does experience anxiety in the form of feeling scared and \"uptight\" inside her body.  Patient reports that she did have a panic attack today and has feelings of impending doom.  Patient reports she does not have an appetite, but has been eating many vegetables and fruits recently due to trying to stimulate a bowel movement.  Patient reports her sleep is not good and that she frequently wakes in the middle the night.          Substance Use:  Patient reports that she uses marijuana regularly, but that she tried to smoke marijuana today and it \"did not go well\".        Functional Achievement:   Patient reports she has been struggling with her ADLs and has not showered or brushed her teeth since discharging from [facility].  She reports due to her medical issues she is not able to drive or work.          Ability to Care for Self::   Same as above.          Current Treatment and Treatment History:  The patient reports to have chronic depression and anxiety.  Patient reports she is currently participating in a  outpatient mental health day program through [facility].  She reports to [place] SHAWN Robison[place] [facility] [place]  The patient reports that she is compliant with her psychiatric medications.  Patient reports she has been inpatient 3 times with the last admission being this past month.        School/Work Performance:  She reports she is currently on disability.        Relevant Social History:  The patient reports that there is most likely a family history of mental health disorders, but is unsure of what specific diagnosis.  She reports to live with her  and 3 adult children.  She reports she has been  for 33 years and has 3 children.  She denies any legal issues.      Abuse Assessment:  Abuse Assessment  Physical Abuse: Denies  Verbal Abuse: Denies  Sexual Abuse: Denies  Neglect: Denies  Does anyone say or do something to you that makes you feel unsafe?: No  Have You Ever Been Harmed by a Partner/Caregiver?: No  Health Concerns r/t Abuse: No    Mental Status Exam:   General Appearance  Characteristics: Disheveled  Eye Contact: Direct  Psychomotor Behavior  Gait/Movement: Unsteady  Abnormal movements: None  Posture: Shaky  Rate of Movement: Normal  Mood and Affect  Mood or Feelings: Sadness  Appropriateness of Affect: Congruent to mood  Range of Affect: Normal  Stability of Affect: Stable  Attitude toward staff: Co-operative  Speech  Rate of Speech: Appropriate  Flow of Speech: Rambling  Intensity of Volume: Ordinary  Clarity: Clear  Cognition  Concentration: Unimpaired  Memory: Remote memory intact;Recent memory intact  Orientation Level: Oriented X4  Insight: Good  Judgment: Good  Thought Patterns  Clarity/Relevance: Coherent  Flow: Organized  Content: Ordinary  Level of Consciousness: Alert  Level of Consciousness: Alert  Behavior  Exhibited behavior: Participated      Disposition:    Rationale for Treatment Recommendation:   Patient presented to the ED due to constipation.  She  reports did not have had a bowel movement in many days.  She reports experiencing increased depression since discharging from [facility] on July 17.  She reports that she also has not been showering since discharging from [facility].  She denies SI, HI, AVH's, and SIB.  The patient denies any aggression.  She reports to using marijuana regularly, but has been having difficulty with it recently.  She reports to be currently enrolled in the outpatient mental health day program through [facility].  She does have a psychiatric provider through [facility] and is compliant with her medications.               Level of Care Recommendations  Consulted with: Dr. Hayes & SHAWN Robison  Level of Care Recommendation: Partial Hospitalization  Program: Adult  Partial Criteria: Moderate to severe impairment in role performance;Inablility to manage intensity of symptoms  Sign-In  Patient Verbalized Understanding: Yes          Eduarda WEINER

## 2024-07-21 NOTE — ED QUICK NOTES
Soap suds given per order, half of enema bag administered & pt tolerated well. Pt able to pass small amount of solid stool after soap suds enema. RN attempted to give remainder of enema, but unable to administer as stool is obstructing flow of fluid & tube meeting significant resistance upon insertion. Pt does state bloated feeling is somewhat improved. MD notified.

## 2024-07-21 NOTE — DISCHARGE INSTRUCTIONS
After meeting with you and completing assessment as well as consulting with ED attending and your outpatient psychiatric provider, it is recommended that you follow-up on an outpatient basis.  If your feel unsafe with self or experience an increase in symptoms, please return to nearest ED or call 911.    Jolie Pringle [address] [place] [address]   [phone]

## 2024-07-23 PROBLEM — F33.2 MDD (MAJOR DEPRESSIVE DISORDER), RECURRENT EPISODE, SEVERE (HCC): Status: ACTIVE | Noted: 2024-07-23

## 2024-07-24 ENCOUNTER — APPOINTMENT (OUTPATIENT)
Dept: CT IMAGING | Facility: HOSPITAL | Age: 64
End: 2024-07-24
Attending: EMERGENCY MEDICINE
Payer: MEDICARE

## 2024-07-24 ENCOUNTER — HOSPITAL ENCOUNTER (INPATIENT)
Facility: HOSPITAL | Age: 64
LOS: 4 days | Discharge: NURSING, REHAB OR LONG-TERM CARE | End: 2024-07-28
Attending: EMERGENCY MEDICINE | Admitting: HOSPITALIST
Payer: MEDICARE

## 2024-07-24 DIAGNOSIS — F33.2 SEVERE RECURRENT MAJOR DEPRESSION WITHOUT PSYCHOTIC FEATURES (HCC): ICD-10-CM

## 2024-07-24 DIAGNOSIS — R56.9 SEIZURES (HCC): ICD-10-CM

## 2024-07-24 DIAGNOSIS — R51.9 NONINTRACTABLE EPISODIC HEADACHE, UNSPECIFIED HEADACHE TYPE: Primary | ICD-10-CM

## 2024-07-24 LAB
ALBUMIN SERPL-MCNC: 4.3 G/DL (ref 3.2–4.8)
ALBUMIN/GLOB SERPL: 1.7 {RATIO} (ref 1–2)
ALP LIVER SERPL-CCNC: 53 U/L
ALT SERPL-CCNC: 23 U/L
ANION GAP SERPL CALC-SCNC: 3 MMOL/L (ref 0–18)
AST SERPL-CCNC: 29 U/L (ref ?–34)
BASOPHILS # BLD AUTO: 0.04 X10(3) UL (ref 0–0.2)
BASOPHILS NFR BLD AUTO: 0.8 %
BILIRUB SERPL-MCNC: 0.3 MG/DL (ref 0.2–1.1)
BUN BLD-MCNC: 11 MG/DL (ref 9–23)
CALCIUM BLD-MCNC: 9.9 MG/DL (ref 8.7–10.4)
CHLORIDE SERPL-SCNC: 104 MMOL/L (ref 98–112)
CO2 SERPL-SCNC: 28 MMOL/L (ref 21–32)
CREAT BLD-MCNC: 0.8 MG/DL
CRP SERPL-MCNC: <0.4 MG/DL (ref ?–0.5)
EGFRCR SERPLBLD CKD-EPI 2021: 82 ML/MIN/1.73M2 (ref 60–?)
EOSINOPHIL # BLD AUTO: 0.19 X10(3) UL (ref 0–0.7)
EOSINOPHIL NFR BLD AUTO: 3.7 %
ERYTHROCYTE [DISTWIDTH] IN BLOOD BY AUTOMATED COUNT: 11.6 %
GLOBULIN PLAS-MCNC: 2.5 G/DL (ref 2.8–4.4)
GLUCOSE BLD-MCNC: 92 MG/DL (ref 70–99)
HCT VFR BLD AUTO: 39.8 %
HGB BLD-MCNC: 13.8 G/DL
IMM GRANULOCYTES # BLD AUTO: 0.01 X10(3) UL (ref 0–1)
IMM GRANULOCYTES NFR BLD: 0.2 %
LYMPHOCYTES # BLD AUTO: 1.07 X10(3) UL (ref 1–4)
LYMPHOCYTES NFR BLD AUTO: 20.7 %
MCH RBC QN AUTO: 33 PG (ref 26–34)
MCHC RBC AUTO-ENTMCNC: 34.7 G/DL (ref 31–37)
MCV RBC AUTO: 95.2 FL
MONOCYTES # BLD AUTO: 0.56 X10(3) UL (ref 0.1–1)
MONOCYTES NFR BLD AUTO: 10.8 %
NEUTROPHILS # BLD AUTO: 3.31 X10 (3) UL (ref 1.5–7.7)
NEUTROPHILS # BLD AUTO: 3.31 X10(3) UL (ref 1.5–7.7)
NEUTROPHILS NFR BLD AUTO: 63.8 %
OSMOLALITY SERPL CALC.SUM OF ELEC: 279 MOSM/KG (ref 275–295)
PLATELET # BLD AUTO: 193 10(3)UL (ref 150–450)
POTASSIUM SERPL-SCNC: 4.5 MMOL/L (ref 3.5–5.1)
PROT SERPL-MCNC: 6.8 G/DL (ref 5.7–8.2)
RBC # BLD AUTO: 4.18 X10(6)UL
SODIUM SERPL-SCNC: 135 MMOL/L (ref 136–145)
WBC # BLD AUTO: 5.2 X10(3) UL (ref 4–11)

## 2024-07-24 PROCEDURE — 72125 CT NECK SPINE W/O DYE: CPT | Performed by: EMERGENCY MEDICINE

## 2024-07-24 PROCEDURE — 99223 1ST HOSP IP/OBS HIGH 75: CPT | Performed by: INTERNAL MEDICINE

## 2024-07-24 PROCEDURE — 70450 CT HEAD/BRAIN W/O DYE: CPT | Performed by: EMERGENCY MEDICINE

## 2024-07-24 RX ORDER — SODIUM CHLORIDE 9 MG/ML
INJECTION, SOLUTION INTRAVENOUS CONTINUOUS
Status: CANCELLED | OUTPATIENT
Start: 2024-07-24 | End: 2024-07-24

## 2024-07-24 RX ORDER — VALACYCLOVIR HYDROCHLORIDE 500 MG/1
500 TABLET, FILM COATED ORAL NIGHTLY
Status: DISCONTINUED | OUTPATIENT
Start: 2024-07-25 | End: 2024-07-24

## 2024-07-24 RX ORDER — GABAPENTIN 300 MG/1
600 CAPSULE ORAL 2 TIMES DAILY
Status: DISCONTINUED | OUTPATIENT
Start: 2024-07-24 | End: 2024-07-28

## 2024-07-24 RX ORDER — SODIUM CHLORIDE 9 MG/ML
INJECTION, SOLUTION INTRAVENOUS CONTINUOUS
Status: ACTIVE | OUTPATIENT
Start: 2024-07-24 | End: 2024-07-25

## 2024-07-24 RX ORDER — LAMOTRIGINE 200 MG/1
200 TABLET ORAL 2 TIMES DAILY
Status: DISCONTINUED | OUTPATIENT
Start: 2024-07-24 | End: 2024-07-25

## 2024-07-24 RX ORDER — ALPRAZOLAM 1 MG/1
1 TABLET ORAL 2 TIMES DAILY PRN
Status: DISCONTINUED | OUTPATIENT
Start: 2024-07-24 | End: 2024-07-28

## 2024-07-24 RX ORDER — BISACODYL 10 MG
10 SUPPOSITORY, RECTAL RECTAL
Status: DISCONTINUED | OUTPATIENT
Start: 2024-07-24 | End: 2024-07-28

## 2024-07-24 RX ORDER — LACOSAMIDE 50 MG/1
200 TABLET ORAL 2 TIMES DAILY
Status: DISCONTINUED | OUTPATIENT
Start: 2024-07-24 | End: 2024-07-25

## 2024-07-24 RX ORDER — ENEMA 19; 7 G/133ML; G/133ML
1 ENEMA RECTAL ONCE AS NEEDED
Status: DISCONTINUED | OUTPATIENT
Start: 2024-07-24 | End: 2024-07-28

## 2024-07-24 RX ORDER — MELATONIN
3 NIGHTLY PRN
Status: DISCONTINUED | OUTPATIENT
Start: 2024-07-24 | End: 2024-07-28

## 2024-07-24 RX ORDER — BENZONATATE 100 MG/1
200 CAPSULE ORAL 3 TIMES DAILY PRN
Status: DISCONTINUED | OUTPATIENT
Start: 2024-07-24 | End: 2024-07-28

## 2024-07-24 RX ORDER — CYCLOBENZAPRINE HCL 10 MG
10 TABLET ORAL 3 TIMES DAILY PRN
Status: DISCONTINUED | OUTPATIENT
Start: 2024-07-24 | End: 2024-07-28

## 2024-07-24 RX ORDER — DEXTROAMPHETAMINE SACCHARATE, AMPHETAMINE ASPARTATE, DEXTROAMPHETAMINE SULFATE AND AMPHETAMINE SULFATE 2.5; 2.5; 2.5; 2.5 MG/1; MG/1; MG/1; MG/1
20 TABLET ORAL
Status: DISCONTINUED | OUTPATIENT
Start: 2024-07-25 | End: 2024-07-28

## 2024-07-24 RX ORDER — ASPIRIN 81 MG/1
81 TABLET ORAL DAILY
Status: DISCONTINUED | OUTPATIENT
Start: 2024-07-25 | End: 2024-07-28

## 2024-07-24 RX ORDER — PANTOPRAZOLE SODIUM 40 MG/1
40 TABLET, DELAYED RELEASE ORAL
Status: DISCONTINUED | OUTPATIENT
Start: 2024-07-25 | End: 2024-07-28

## 2024-07-24 RX ORDER — SENNOSIDES 8.6 MG
17.2 TABLET ORAL NIGHTLY PRN
Status: DISCONTINUED | OUTPATIENT
Start: 2024-07-24 | End: 2024-07-28

## 2024-07-24 RX ORDER — BUTALBITAL, ACETAMINOPHEN AND CAFFEINE 50; 325; 40 MG/1; MG/1; MG/1
1 TABLET ORAL EVERY 4 HOURS PRN
Status: DISCONTINUED | OUTPATIENT
Start: 2024-07-24 | End: 2024-07-28

## 2024-07-24 RX ORDER — OXYCODONE AND ACETAMINOPHEN 10; 325 MG/1; MG/1
1 TABLET ORAL EVERY 8 HOURS PRN
Status: DISCONTINUED | OUTPATIENT
Start: 2024-07-24 | End: 2024-07-28

## 2024-07-24 RX ORDER — ACETAMINOPHEN 500 MG
500 TABLET ORAL EVERY 4 HOURS PRN
Status: DISCONTINUED | OUTPATIENT
Start: 2024-07-24 | End: 2024-07-28

## 2024-07-24 RX ORDER — DIPHENHYDRAMINE HYDROCHLORIDE 50 MG/ML
25 INJECTION INTRAMUSCULAR; INTRAVENOUS ONCE
Status: COMPLETED | OUTPATIENT
Start: 2024-07-24 | End: 2024-07-24

## 2024-07-24 RX ORDER — COLCHICINE 0.6 MG/1
0.6 TABLET ORAL 2 TIMES DAILY
Status: DISCONTINUED | OUTPATIENT
Start: 2024-07-24 | End: 2024-07-28

## 2024-07-24 RX ORDER — DULOXETIN HYDROCHLORIDE 60 MG/1
120 CAPSULE, DELAYED RELEASE ORAL DAILY
Status: DISCONTINUED | OUTPATIENT
Start: 2024-07-25 | End: 2024-07-28

## 2024-07-24 RX ORDER — TRAZODONE HYDROCHLORIDE 100 MG/1
100 TABLET ORAL NIGHTLY
Status: DISCONTINUED | OUTPATIENT
Start: 2024-07-24 | End: 2024-07-25

## 2024-07-24 RX ORDER — HYDROMORPHONE HYDROCHLORIDE 1 MG/ML
0.5 INJECTION, SOLUTION INTRAMUSCULAR; INTRAVENOUS; SUBCUTANEOUS ONCE
Status: COMPLETED | OUTPATIENT
Start: 2024-07-24 | End: 2024-07-24

## 2024-07-24 RX ORDER — HYDROMORPHONE HYDROCHLORIDE 1 MG/ML
1 INJECTION, SOLUTION INTRAMUSCULAR; INTRAVENOUS; SUBCUTANEOUS ONCE
Status: DISCONTINUED | OUTPATIENT
Start: 2024-07-24 | End: 2024-07-24

## 2024-07-24 RX ORDER — METOCLOPRAMIDE HYDROCHLORIDE 5 MG/ML
10 INJECTION INTRAMUSCULAR; INTRAVENOUS ONCE
Status: COMPLETED | OUTPATIENT
Start: 2024-07-24 | End: 2024-07-24

## 2024-07-24 RX ORDER — PROCHLORPERAZINE EDISYLATE 5 MG/ML
5 INJECTION INTRAMUSCULAR; INTRAVENOUS EVERY 8 HOURS PRN
Status: DISCONTINUED | OUTPATIENT
Start: 2024-07-24 | End: 2024-07-28

## 2024-07-24 RX ORDER — MODAFINIL 100 MG/1
200 TABLET ORAL DAILY
Status: DISCONTINUED | OUTPATIENT
Start: 2024-07-25 | End: 2024-07-28

## 2024-07-24 RX ORDER — VALACYCLOVIR HYDROCHLORIDE 500 MG/1
500 TABLET, FILM COATED ORAL NIGHTLY
Status: DISCONTINUED | OUTPATIENT
Start: 2024-07-24 | End: 2024-07-28

## 2024-07-24 RX ORDER — LAMOTRIGINE 100 MG/1
100 TABLET ORAL
Status: DISCONTINUED | OUTPATIENT
Start: 2024-07-25 | End: 2024-07-28

## 2024-07-24 RX ORDER — ONDANSETRON 2 MG/ML
4 INJECTION INTRAMUSCULAR; INTRAVENOUS EVERY 4 HOURS PRN
Status: CANCELLED | OUTPATIENT
Start: 2024-07-24 | End: 2024-07-24

## 2024-07-24 RX ORDER — ENOXAPARIN SODIUM 100 MG/ML
40 INJECTION SUBCUTANEOUS DAILY
Status: DISCONTINUED | OUTPATIENT
Start: 2024-07-25 | End: 2024-07-28

## 2024-07-24 RX ORDER — KETOROLAC TROMETHAMINE 15 MG/ML
15 INJECTION, SOLUTION INTRAMUSCULAR; INTRAVENOUS EVERY 6 HOURS PRN
Status: DISPENSED | OUTPATIENT
Start: 2024-07-24 | End: 2024-07-26

## 2024-07-24 RX ORDER — KETOROLAC TROMETHAMINE 15 MG/ML
15 INJECTION, SOLUTION INTRAMUSCULAR; INTRAVENOUS ONCE
Status: COMPLETED | OUTPATIENT
Start: 2024-07-24 | End: 2024-07-24

## 2024-07-24 RX ORDER — METOPROLOL SUCCINATE 25 MG/1
25 TABLET, EXTENDED RELEASE ORAL
Status: DISCONTINUED | OUTPATIENT
Start: 2024-07-25 | End: 2024-07-28

## 2024-07-24 RX ORDER — ECHINACEA PURPUREA EXTRACT 125 MG
1 TABLET ORAL
Status: DISCONTINUED | OUTPATIENT
Start: 2024-07-24 | End: 2024-07-28

## 2024-07-24 RX ORDER — ONDANSETRON 2 MG/ML
4 INJECTION INTRAMUSCULAR; INTRAVENOUS ONCE
Status: COMPLETED | OUTPATIENT
Start: 2024-07-24 | End: 2024-07-24

## 2024-07-24 RX ORDER — ATORVASTATIN CALCIUM 80 MG/1
80 TABLET, FILM COATED ORAL DAILY
Status: DISCONTINUED | OUTPATIENT
Start: 2024-07-25 | End: 2024-07-28

## 2024-07-24 RX ORDER — ONDANSETRON 2 MG/ML
4 INJECTION INTRAMUSCULAR; INTRAVENOUS EVERY 6 HOURS PRN
Status: DISCONTINUED | OUTPATIENT
Start: 2024-07-24 | End: 2024-07-28

## 2024-07-24 RX ORDER — HYDROMORPHONE HYDROCHLORIDE 1 MG/ML
1 INJECTION, SOLUTION INTRAMUSCULAR; INTRAVENOUS; SUBCUTANEOUS EVERY 4 HOURS PRN
Status: DISCONTINUED | OUTPATIENT
Start: 2024-07-24 | End: 2024-07-28

## 2024-07-24 RX ORDER — POLYETHYLENE GLYCOL 3350 17 G/17G
17 POWDER, FOR SOLUTION ORAL DAILY PRN
Status: DISCONTINUED | OUTPATIENT
Start: 2024-07-24 | End: 2024-07-26

## 2024-07-24 NOTE — ED QUICK NOTES
Orders for admission, patient is aware of plan and ready to go upstairs. Any questions, please call ED RN rafa at extension [phone].     Patient Covid vaccination status: Fully vaccinated     COVID Test Ordered in ED: None    COVID Suspicion at Admission: N/A    Running Infusions:  None    Mental Status/LOC at time of transport: AxO x4    Other pertinent information: Suicide precautions. Petition and cert, needs sitter. Has neck brace for neck pain. Continent x2, on room air  CIWA score: N/A   NIH score:  N/A

## 2024-07-24 NOTE — ED PROVIDER NOTES
Patient Seen in: [facility] Emergency Department      History     Chief Complaint   Patient presents with    Neck Pain     Stiff neck     Stated Complaint:     Subjective:   HPI     This is a 64-year-old female who arrives here with complaints of headache, neck stiffness started yesterday.  The patient has a history of aseptic meningitis.  She is actually at [facility] for suicidal ideation.  She does have chronic headaches also.  She states that she has had no fever no numbness no weakness.  No numbness or weakness no fevers.  Does have history of depression, mom Behcet's disease.  Arthritis, anxiety history of hepatitis A, seizure disorder.  Cervical disc disease.  And cholelithiasis.  She has had many episodes of what they described as a septic meningitis.  Does have history of fibromyalgia.        Objective:   Past Medical History:    Anxiety state, unspecified    Arthritis    Back problem    SPASMS HERNIATED DISC  IN C SPINE    Behcet's disease (HCC)    NEURO, BRAIN, GI  AND SKIN INVOLVEMENT    Binge eating    Carpal tunnel syndrome, bilateral    Cervical disc disease    MRI [facility] 11-15, jw C6-7    CHF (congestive heart failure) ([facility])    Cholelithiasis    CONGESTIVE HEART FAILURE      2014 being controled - has issue with edema in lower extremities    Depression    Diastolic dysfunction    2-D echocardiogram normal 2/2015 at [place] [facility]    Diverticulosis of colon (without mention of hemorrhage)    Chris-Danlos syndrome type III (HCC)    hypermobile    Fibromyalgia    GERD (gastroesophageal reflux disease)    Glenohumeral arthritis, right    MRI [facility] 11-15; sees Dr. Serrato- ortho    Hammertoe    Hand fracture    L 5th digit    Hearing impairment    bilateral hearing aids    Hepatitis A    LONG AGO    Hepatitis, unspecified    Hep A    High blood pressure    Pt denies    Hypercholesteremia    Hypertension    Hypertriglyceridemia    Impaired glucose tolerance    Migraines    Morbid obesity with BMI of  45.0-49.9, adult (HCC)    Miller's neuroma    Neuropathy    toes and left foot    BRISA on CPAP    Osteoarthritis    generalized    Pancreatic divisum    MRCP 2015    Pneumonia, organism unspecified(486)    Pre-diabetes    Seizure disorder (HCC)    olefactory seizures history not since age 26    Spondylosis of cervical region without myelopathy or radiculopathy    MRI [facility] 11-15    Unspecified sleep apnea    BIPAP    Visual impairment    glasses              Past Surgical History:   Procedure Laterality Date    Appendectomy      Arthroscopy of joint unlisted Bilateral     right shoulder and both knees      1994 x2, 1992 x 1    total of 3    Cholecystectomy      Colonoscopy      Egd N/A 2020    Procedure: ESOPHAGOGASTRODUODENOSCOPY, COLONOSCOPY, POSSIBLE BIOPSY, POSSIBLE POLYPECTOMY 53186, 44246;  Surgeon: Kwabena Mayer MD;  Location: [facility]    Hernia surgery  10/22/2019    Umb hernia repair/ASc-Dr Adan    Other      , and - deborah joint toes/ had bone grafts with stem cells recreated both toes/        Other  had numerous surgeries    Great toe replacements removed and then reconstructed     Other      reconstructive surgery on left foot with hardware then it was removed    Other Right     tkr    Other  2018    Revision 5th metatarsal fusion    Other surgical history      BILATERAL TOE joint REPLACEMENT-     Other surgical history      right shoulder aa/dcr/debridement rotator cuff     Other surgical history  2017    L shoulder replacement    Other surgical history      R foot surgery x4    Tonsillectomy                  Social History     Socioeconomic History    Marital status:    Tobacco Use    Smoking status: Former     Current packs/day: 0.00     Average packs/day: 0.5 packs/day for 5.0 years (2.5 ttl pk-yrs)     Types: Cigarettes     Start date: 1986     Quit date: 1991     Years since quittin.5    Smokeless tobacco: Never   Vaping Use     Vaping status: Never Used   Substance and Sexual Activity    Alcohol use: No     Alcohol/week: 0.0 standard drinks of alcohol    Drug use: No     Social Determinants of Health     Financial Resource Strain: Low Risk  (7/11/2024)    Financial Resource Strain     Med Affordability: No   Food Insecurity: No Food Insecurity (7/11/2024)    Food Insecurity     Food Insecurity: Never true   Transportation Needs: No Transportation Needs (7/11/2024)    Transportation Needs     Lack of Transportation: No   Physical Activity: High Risk (12/28/2023)    Received from [facility]    Exercise Vital Sign     On average, how many days per week do you engage in moderate to strenuous exercise (like a brisk walk)?: 0 days     On average, how many minutes do you engage in exercise at this level?: 0 min   Stress: Stress Concern Present (1/3/2022)    Received from [facility] - Occupational Stress Questionnaire     Feeling of Stress : To some extent   Social Connections: Low Risk  (5/20/2023)    Received from [facility]    Social Connections     How often do you see or talk to people that you care about and feel close to? (For example: talking to friends on the phone, visiting friends or family, going to Sikhism or club meetings): 5 or more times a week   Housing Stability: Low Risk  (7/11/2024)    Housing Stability     Housing Instability: No              Review of Systems    Positive for stated Chief Complaint: Neck Pain (Stiff neck)    Other systems are as noted in HPI.  Constitutional and vital signs reviewed.      All other systems reviewed and negative except as noted above.    Physical Exam     ED Triage Vitals   BP 07/24/24 1400 141/81   Pulse 07/24/24 1400 61   Resp 07/24/24 1400 18   Temp 07/24/24 1700 98 °F (36.7 °C)   Temp src 07/24/24 1700 Temporal   SpO2 07/24/24 1400 100 %   O2 Device 07/24/24 1400 None (Room  air)       Current Vitals:   Vital Signs  BP: (!) 129/91  Pulse: 56  Resp: 18  Temp: 98 °F (36.7 °C)  Temp src: Temporal  MAP (mmHg): (!) 103    Oxygen Therapy  SpO2: 97 %  O2 Device: None (Room air)            Physical Exam  General: .  Patient has some photophobia noted.  There is no meningismus noted at this present time she is able to move the neck.  She does complain of some neck stiffness.  But she is able to move her neck.  The patient is in no respiratory distress    HEENT: Atraumatic, conjunctiva are not pale.  There is no icterus.  Oral mucosa Is wet.  No facial trauma.  The neck is supple.    LUNGS: Clear to auscultation, there is no wheezing or retraction.  No crackles.    CV: Cardiovascular is regular without murmurs or rubs.    ABD: The abdomen is soft nondistended nontender.  There is no rebound.  There is no guarding.    EXT: There is good pulses bilaterally.  There is no calf tenderness.  There is no rash noted.  There is no edema    NEURO: Alert and oriented x4.  Muscle strength and sensory exam is grossly normal.  And the patient is neurologically intact with no focal findings.         ED Course     Labs Reviewed   COMP METABOLIC PANEL (14) - Abnormal; Notable for the following components:       Result Value    Sodium 135 (*)     Globulin  2.5 (*)     All other components within normal limits   C-REACTIVE PROTEIN - Normal   CBC WITH DIFFERENTIAL WITH PLATELET    Narrative:     The following orders were created for panel order CBC With Differential With Platelet.  Procedure                               Abnormality         Status                     ---------                               -----------         ------                     CBC W/ DIFFERENTIAL[[id number]]                              Final result                 Please view results for these tests on the individual orders.   CBC W/ DIFFERENTIAL             The patient was placed on monitors, IV was started, blood was drawn.         MDM                   The patient's comprehensive was grossly negative CRP was not elevated CBC was normal.    A CT was done to rule out bleed, fracture of the cervical spine.         I personally reviewed the radiographs and my individual interpretation shows    No obvious intracranial bleed, no obvious fracture noted.    Also reviewed official report and it shows     CT SPINE CERVICAL (CPT=72125)    Result Date: 7/24/2024  PROCEDURE:  CT SPINE CERVICAL (CPT=72125)  COMPARISON:  None.  INDICATIONS:  pain  TECHNIQUE:  Noncontrast CT scanning of the cervical spine is performed from the skull base through C7.  Multiplanar reconstructions are generated.  Dose reduction techniques were used. Dose information is transmitted to the ACR (American College of Radiology) NRDR (National Radiology Data Registry) which includes the Dose Index Registry.  PATIENT STATED HISTORY: (As transcribed by Technologist)  Patient is here for headache and neck pain. no known trauma.    FINDINGS:  Cervical spine demonstrates normal vertebral body height and alignment.  No acute fractures are identified.  Mild-to-moderate multilevel degenerative changes are noted.  There is disc desiccation with endplate changes at C6-C7.  The spinal canal is patent.  The soft tissues are within normal limits. Lung apices are within normal limits.            CONCLUSION:  No acute abnormality in the cervical spine.   LOCATION:  [facility]   Dictated by (CST): Ramiro Henderson MD on 7/24/2024 at 2:39 PM     Finalized by (CST): Ramiro Henderson MD on 7/24/2024 at 2:44 PM       CT BRAIN OR HEAD (CPT=70450)    Result Date: 7/24/2024  PROCEDURE:  CT BRAIN OR HEAD (46525)  COMPARISON:  None.  INDICATIONS:  Headache  TECHNIQUE:  Noncontrast CT scanning is performed through the brain. Dose reduction techniques were used. Dose information is transmitted to the ACR (American College of Radiology) NRDR (National Radiology Data Registry) which includes the Dose Index Registry.  PATIENT  STATED HISTORY: (As transcribed by Technologist)  Patient is here for headache and neck pain. no known trauma.    FINDINGS: Ventricles and sulci are within normal limits.  There is no midline shift or mass-effect.  The basal cisterns are patent.  The gray-white matter differentiation is intact.  There is no acute intracranial hemorrhage or extra-axial fluid collection.  Hypodensities in the periventricular subcortical white matter is compatible chronic small vessel disease.  There is no evident fracture.  The visualized paranasal sinuses and mastoid air cells are unremarkable.             CONCLUSION:  No acute intracranial abnormality.    LOCATION:  [facility]   Dictated by (CST): Ramiro Henderson MD on 7/24/2024 at 2:32 PM     Finalized by (CST): Ramiro Henderson MD on 7/24/2024 at 2:35 PM        I did discuss with her after her workup.  That there is no overt findings of any obvious intracranial bleed, fracture I did go back and reexamined she is actually been able to bend the neck there is no findings of clinical findings of meningitis I discussed that it could be still be aseptic manage elevated due to spasm she is afraid a little concerned because it can cause CSF leak.  I did discuss with her we can give her dose of Dilaudid.  And then she can make a decision if she wants the spinal tap.  Discussed with her I do not see any clinical findings of meningitis.  She was comfortable sleeping on repeat exam.          I did give her dose of Dilaudid here she is not febrile I reexamined her there is no meningismus no focal findings I talked to her .  I discussed with that I would recommend a spinal tap to determine if there is any aseptic manage as I do not feel that this is clinically bacterial meningitis.  Which the family and her  refused I discussed this case with the patient extensive talk to the hospitalist.  The patient will be admitted.  I discussed this case with Dr. Rubio have also discussed this case  with general neuro Dr. Salomon he did review he stated that he would recommend a spinal tap if she is concerned about it.  The patient adamantly refused understands I cannot tell about viral meningitis without it.  Disc he did state if she refused to get an MRI with and without contrast which I did order talk to the hospitalist and they will order.  The patient will be admitted for further evaluation.        Medical Decision Making      Disposition and Plan     Clinical Impression:  1. Nonintractable episodic headache, unspecified headache type         Disposition:  Admit  7/24/2024  6:38 pm    Follow-up:  No follow-up provider specified.        Medications Prescribed:  Current Discharge Medication List                            Hospital Problems       Present on Admission  Date Reviewed: 7/11/2024            ICD-10-CM Noted POA    * (Principal) Nonintractable episodic headache, unspecified headache type R51.9 7/24/2024 Unknown

## 2024-07-24 NOTE — ED INITIAL ASSESSMENT (HPI)
Patient endorses head and neck pain stated she has hx of meningitis. Pain is 9/10 no meds taken patient a/o x 4 denies any blurry vision. Confirms photophobia.

## 2024-07-24 NOTE — H&P
[facility]     Vaishali Darling Patient Status:  Emergency    1960 MRN [id number]   [facility] EMERGENCY DEPARTMENT Attending George Foster MD   Hosp Day # 0 PCP Kyung Girard MD     Chief Complaint: Headache, neck pain, photophobia    Subjective:    History of Present Illness:   Vaishali Darling is a 64 year old female with PMHx aspetic meningitis, fibromyalgia, Behcet's disease, Chris-Danlos syndrome, psoriasis, chronic pain, BRISA on CPAP, HTN, HLD, anxiety, depression, ADHD and seizure disorder who presents to the hospital for evaluation of headache, neck pain and photophobia. She is currently at [facility] for SI, anxiety and depression.  Her  is at bedside who assist with the history.  She reports her symptoms of headache, neck pain and photophobia started yesterday and that she has a history of aseptic meningitis and has been admitted multiple times in the past for spinal taps.  She denies any fever but reports nausea.  No vomiting or shortness of breath.  She denies any recent travel or rashes.  In the ER she was afebrile and hemodynamically stable.  CRP is negative.  CT brain and CT cervical spine without acute abnormalities.  Her labs are otherwise unremarkable.  She refused spinal tap.  Neurology consulted and recommended MRI with and without contrast.  Johannater from [facility] is currently at bedside.    History/Other:    Past Medical History:  Past Medical History:    Anxiety state, unspecified    Arthritis    Back problem    SPASMS HERNIATED DISC  IN C SPINE    Behcet's disease (HCC)    NEURO, BRAIN, GI  AND SKIN INVOLVEMENT    Binge eating    Carpal tunnel syndrome, bilateral    Cervical disc disease    MRI [facility] 11-15, jw C6-7    CHF (congestive heart failure) ([facility])    Cholelithiasis    CONGESTIVE HEART FAILURE       being controled - has issue with edema in lower extremities    Depression    Diastolic dysfunction    2-D echocardiogram normal  2015 at [facility]    Diverticulosis of colon (without mention of hemorrhage)    Chris-Danlos syndrome type III (HCC)    hypermobile    Fibromyalgia    GERD (gastroesophageal reflux disease)    Glenohumeral arthritis, right    MRI [facility] 11-15; sees Dr. Serrato- ortho    Hammertoe    Hand fracture    L 5th digit    Hearing impairment    bilateral hearing aids    Hepatitis A    LONG AGO    Hepatitis, unspecified    Hep A    High blood pressure    Pt denies    Hypercholesteremia    Hypertension    Hypertriglyceridemia    Impaired glucose tolerance    Migraines    Morbid obesity with BMI of 45.0-49.9, adult (HCC)    Miller's neuroma    Neuropathy    toes and left foot    BRISA on CPAP    Osteoarthritis    generalized    Pancreatic divisum    MRCP 2015    Pneumonia, organism unspecified(486)    Pre-diabetes    Seizure disorder (HCC)    olefactory seizures history not since age 26    Spondylosis of cervical region without myelopathy or radiculopathy    MRI [facility] 11-15    Unspecified sleep apnea    BIPAP    Visual impairment    glasses     Past Surgical History:   Past Surgical History:   Procedure Laterality Date    Appendectomy      Arthroscopy of joint unlisted Bilateral     right shoulder and both knees      1994 x2, 1992 x 1    total of 3    Cholecystectomy      Colonoscopy      Egd N/A 2020    Procedure: ESOPHAGOGASTRODUODENOSCOPY, COLONOSCOPY, POSSIBLE BIOPSY, POSSIBLE POLYPECTOMY 30438, 91749;  Surgeon: Kwabena Mayer MD;  Location: [facility]    Hernia surgery  10/22/2019    Umb hernia repair/ASc-Dr Adan    Other      , and 2014- deborah joint toes/ had bone grafts with stem cells recreated both toes/        Other  had numerous surgeries    Great toe replacements removed and then reconstructed     Other      reconstructive surgery on left foot with hardware then it was removed    Other Right     tkr    Other  2018    Revision 5th metatarsal fusion    Other surgical history       BILATERAL TOE joint REPLACEMENT-2006     Other surgical history      right shoulder aa/dcr/debridement rotator cuff     Other surgical history  1/2017    L shoulder replacement    Other surgical history      R foot surgery x4    Tonsillectomy        Family History:   Family History   Problem Relation Age of Onset    Diabetes Father     Cancer Father         prostate cancer    Lung Disorder Mother         COPD     Social History:    reports that she quit smoking about 33 years ago. Her smoking use included cigarettes. She started smoking about 38 years ago. She has a 2.5 pack-year smoking history. She has never used smokeless tobacco. She reports that she does not drink alcohol and does not use drugs.     Allergies:   Allergies   Allergen Reactions    Latex ITCHING and SWELLING     blisters  redness    Tequin [Gatifloxacin] ANAPHYLAXIS     Facial swelling 5/03      Adhesive Tape RASH     Adhesive tape Tears skin Redness and blistering  Paper tape and tegaderm OK    Imitrex [Sumatriptan]      palpitations     Medications:    Current Facility-Administered Medications on File Prior to Encounter   Medication Dose Route Frequency Provider Last Rate Last Admin    [COMPLETED] lamoTRIgine (LaMICtal) tab 150 mg  150 mg Oral Once Tanesha Hayes MD   150 mg at 07/20/24 2148    [COMPLETED] lacosamide (Vimpat) tab 200 mg  200 mg Oral Once Tanesha Hayes MD   200 mg at 07/20/24 2243    [COMPLETED] acetaminophen (Tylenol Extra Strength) tab 1,000 mg  1,000 mg Oral Once Tanesha Hayes MD   1,000 mg at 07/20/24 2148    [COMPLETED] fleet enema (Fleet) 7-19 GM/118ML rectal enema 133 mL  1 enema Rectal Once PRN Blaire Trammell MD   133 mL at 07/16/24 2028    [COMPLETED] fleet enema (Fleet) 7-19 GM/118ML rectal enema 133 mL  1 enema Rectal Once PRN FoxAntony Siddiqui APMARZENA   133 mL at 07/15/24 1434    [COMPLETED] bisacodyl (Dulcolax) 10 MG rectal suppository 10 mg  10 mg Rectal Once Blaire Trammell MD   10 mg at  24 1320     Current Outpatient Medications on File Prior to Encounter   Medication Sig Dispense Refill    B Complex-C-Folic Acid Oral Tab Take 2 tablets by mouth at bedtime.      Calcium Carbonate-Vitamin D (CALCIUM 600-VITAMIN D3 OR) Take 1 tablet by mouth daily.      zinc sulfate 220 (50 Zn) MG Oral Cap Take 1 capsule (220 mg total) by mouth daily.      amphetamine-dextroamphetamine (ADDERALL) 20 MG Oral Tab Take 1 tablet (20 mg total) by mouth 2 (two) times daily. 60 tablet 0    Multiple Vitamins-Minerals (CENTRUM SILVER 50+WOMEN OR) Take 1 tablet by mouth daily.      aspirin-acetaminophen-caffeine 250-250-65 MG Oral Tab Take 2 tablets by mouth every 4 (four) hours as needed for Pain.      [] polyethylene glycol, PEG 3350-KCl-NaBcb-NaCl-NaSulf, 236 g Oral Recon Soln Take 4,000 mL by mouth once for 1 dose. 4000 mL 0    DULoxetine 60 MG Oral Cap DR Particles Take 2 capsules (120 mg total) by mouth daily.      modafinil 200 MG Oral Tab Take 1 tablet (200 mg total) by mouth daily. 30 tablet 0    aspirin 81 MG Oral Tab EC Take 1 tablet (81 mg total) by mouth daily.      atorvastatin 80 MG Oral Tab Take 1 tablet (80 mg total) by mouth daily.      diclofenac 75 MG Oral Tab EC Take 1 tablet (75 mg total) by mouth 2 (two) times daily.      Norethindrone-Eth Estradiol 1-5 MG-MCG Oral Tab Take 1 tablet by mouth daily.      lamoTRIgine 150 MG Oral Tab Take 1 tablet (150 mg total) by mouth 2 (two) times daily.      colchicine 0.6 MG Oral Tab Take 1 tablet (0.6 mg total) by mouth 2 (two) times daily.      oxyCODONE-acetaminophen  MG Oral Tab Take 1 tablet by mouth every 8 (eight) hours as needed for Pain.      lamoTRIgine 100 MG Oral Tab Take 1 tablet (100 mg total) by mouth daily. Take at noon      lacosamide 200 MG Oral Tab Take 1 tablet (200 mg total) by mouth 2 (two) times daily.      ALPRAZolam (XANAX) 1 MG Oral Tab Take 1 tablet (1 mg total) by mouth 2 (two) times daily as needed for Anxiety. 90 tablet  1    traZODone 50 MG Oral Tab TAKE 1 TABLET(50 MG) BY MOUTH at bedtime as needed (Patient taking differently: Take 2 tablets (100 mg total) by mouth nightly as needed for Sleep. TAKE 1 TABLET(50 MG) BY MOUTH at bedtime as needed) 90 tablet 1    metoprolol succinate ER 25 MG Oral Tablet 24 Hr Take 1 tablet (25 mg total) by mouth daily.      ondansetron 4 MG Oral Tablet Dispersible Take 2 tablets (8 mg total) by mouth every 12 (twelve) hours as needed for Nausea.      VALACYCLOVIR 500 MG Oral Tab TAKE ONE TABLET BY MOUTH ONE TIME DAILY 90 tablet 3    CYCLOBENZAPRINE 10 MG Oral Tab Take 1 tablet (10 mg total) by mouth 3 (three) times daily as needed for Muscle spasms. 60 tablet 0    OMEPRAZOLE 40 MG Oral Capsule Delayed Release Take 1 capsule (40 mg total) by mouth daily. (Patient taking differently: Take 1 capsule (40 mg total) by mouth in the morning and 1 capsule (40 mg total) before bedtime.) 90 capsule 3    GABAPENTIN 300 MG Oral Cap Take 1 capsule (300 mg total) by mouth 2 (two) times daily. 180 capsule 3    BUTALBITAL-ASPIRIN-CAFFEINE -40 MG Oral Cap TAKE 1 CAPSULE BY MOUTH EVERY SIX HOURS AS NEEDED FOR PAIN 50 capsule 0    Clobetasol Propionate 0.05 % External Solution Apply to affected areas of scalp, ears daily as needed 50 mL 2    Wheat Dextrin (BENEFIBER) Oral Powder Use 1 scoop daily in 8 ounces of your desired beverage 1 Can 0    Polyethylene Glycol 3350 (SM CLEARLAX) Oral Powder MIX 17 GRAMS IN LIQUID AND DRINK DAILY 510 g 3    Probiotic Product (PROBIOTIC OR) Take 1 capsule by mouth daily.      PREBIOTIC PRODUCT OR Take 1 capsule by mouth daily.      FREESTYLE LITE TEST In Vitro Strip TEST SUGARS EVERY MORNING, ALTERNATING WITH 2 HOURS POST PRANDIAL DAILY 150 strip 0     Review of Systems:   A comprehensive review of systems was completed.    Pertinent positives and negatives noted in the HPI.    Objective:   Physical Exam:    BP (!) 129/91   Pulse 56   Temp 98 °F (36.7 °C) (Temporal)   Resp 18    LMP 01/20/2011   SpO2 97%   General: No acute distress, awake and alert but drowsy  Respiratory: No rhonchi, no wheezes  Cardiovascular: S1, S2. Regular rate and rhythm  Abdomen: Soft, Non-tender, non-distended, positive bowel sounds  Neuro: MCGEE x 4  Extremities: No edema    Results:    Labs:      Labs Last 24 Hours:  Recent Labs   Lab 07/20/24  1906 07/23/24  0519 07/24/24  1458   RBC 4.06 4.07 4.18   HGB 13.4 13.3 13.8   HCT 38.0 38.5 39.8   MCV 93.6 94.6 95.2   MCH 33.0 32.7 33.0   MCHC 35.3 34.5 34.7   RDW 11.7 11.4 11.6   NEPRELIM 3.99 2.47 3.31   WBC 6.4 5.2 5.2   .0 188.0 193.0     Recent Labs   Lab 07/20/24 1906 07/23/24  0519 07/24/24  1458   GLU 89 88 92   BUN 12 10 11   CREATSERUM 0.77 0.70 0.80   EGFRCR 86 97 82   CA 9.1 9.2 9.9   ALB 4.5 4.1 4.3   * 135* 135*   K 4.3 4.3 4.5    103 104   CO2 23.0 26.0 28.0   ALKPHO 51 48* 53   AST 36* 27 29   ALT 28 22 23   BILT 0.2 0.3 0.3   TP 6.9 6.3 6.8     No results found for: \"PT\", \"INR\"    No results for input(s): \"TROP\", \"TROPHS\", \"CK\" in the last 168 hours.    No results for input(s): \"TROP\", \"PBNP\" in the last 168 hours.    No results for input(s): \"PCT\" in the last 168 hours.    Imaging: Imaging data reviewed in Epic.    Assessment & Plan:      #Headache, neck pain and photophobia  -Patient has history of aseptic meningitis multiple times in the past but is refusing spinal tap  -Pain control, discussed that we will try to avoid narcotics.  Try Toradol and Fioricet first  -Neurology consult  -MRI with and without contrast  -Gentle IVF overnight    #Anxiety and depression  -Currently admitted at [facility]  -Currently denies any suicidal ideation  -Continue sitter and suicide precautions  -Psych consult  -Resume home meds    #ADHD  -Adderall 20 mg in the morning and 20 mg at noon    #Fibromyalgia  -Duloxetine    #Behcet's disease  #Chris-Danlos syndrome  #Psoriasis  -Follows with rheum  -Colchicine 0.6 mg BID  -Plan was to  start Remicade    #Chronic pain  -Resume home pain regimen    #BRISA on CPA  -BRISA protocol    #HTN  -BB    #HLD  -Statin    #Seizure disorder  -Resume home meds    Plan of care discussed with patient,  and ER physician.    Alexander Rubio,     Supplementary Documentation:     The 21st Century Cures Act makes medical notes like these available to patients in the interest of transparency. Please be advised this is a medical document. Medical documents are intended to carry relevant information, facts as evident, and the clinical opinion of the practitioner. The medical note is intended as peer to peer communication and may appear blunt or direct. It is written in medical language and may contain abbreviations or verbiage that are unfamiliar.

## 2024-07-24 NOTE — ED QUICK NOTES
Rounded on pt, reports pain has improved after medication administration. RT to come down with distilled water for pt's CPAP as she is falling asleep, ok for pt to wear CPAP per MD

## 2024-07-25 ENCOUNTER — APPOINTMENT (OUTPATIENT)
Dept: MRI IMAGING | Facility: HOSPITAL | Age: 64
End: 2024-07-25
Attending: INTERNAL MEDICINE
Payer: MEDICARE

## 2024-07-25 PROBLEM — R56.9 SEIZURES (HCC): Status: ACTIVE | Noted: 2024-07-25

## 2024-07-25 PROBLEM — R52 PAIN: Status: ACTIVE | Noted: 2024-07-25

## 2024-07-25 PROBLEM — F33.2 MAJOR DEPRESSIVE DISORDER, RECURRENT SEVERE WITHOUT PSYCHOTIC FEATURES (HCC): Status: ACTIVE | Noted: 2024-07-11

## 2024-07-25 LAB
ANION GAP SERPL CALC-SCNC: 8 MMOL/L (ref 0–18)
BASOPHILS # BLD AUTO: 0.04 X10(3) UL (ref 0–0.2)
BASOPHILS NFR BLD AUTO: 0.9 %
BUN BLD-MCNC: 10 MG/DL (ref 9–23)
CALCIUM BLD-MCNC: 8.9 MG/DL (ref 8.7–10.4)
CHLORIDE SERPL-SCNC: 106 MMOL/L (ref 98–112)
CK SERPL-CCNC: 37 U/L
CO2 SERPL-SCNC: 24 MMOL/L (ref 21–32)
CREAT BLD-MCNC: 0.72 MG/DL
EGFRCR SERPLBLD CKD-EPI 2021: 93 ML/MIN/1.73M2 (ref 60–?)
EOSINOPHIL # BLD AUTO: 0.23 X10(3) UL (ref 0–0.7)
EOSINOPHIL NFR BLD AUTO: 5.3 %
ERYTHROCYTE [DISTWIDTH] IN BLOOD BY AUTOMATED COUNT: 11.8 %
GLUCOSE BLD-MCNC: 88 MG/DL (ref 70–99)
HCT VFR BLD AUTO: 36.1 %
HGB BLD-MCNC: 12.5 G/DL
IMM GRANULOCYTES # BLD AUTO: 0.02 X10(3) UL (ref 0–1)
IMM GRANULOCYTES NFR BLD: 0.5 %
LYMPHOCYTES # BLD AUTO: 1.56 X10(3) UL (ref 1–4)
LYMPHOCYTES NFR BLD AUTO: 36.2 %
MCH RBC QN AUTO: 33.2 PG (ref 26–34)
MCHC RBC AUTO-ENTMCNC: 34.6 G/DL (ref 31–37)
MCV RBC AUTO: 96 FL
MONOCYTES # BLD AUTO: 0.5 X10(3) UL (ref 0.1–1)
MONOCYTES NFR BLD AUTO: 11.6 %
NEUTROPHILS # BLD AUTO: 1.96 X10 (3) UL (ref 1.5–7.7)
NEUTROPHILS # BLD AUTO: 1.96 X10(3) UL (ref 1.5–7.7)
NEUTROPHILS NFR BLD AUTO: 45.5 %
OSMOLALITY SERPL CALC.SUM OF ELEC: 284 MOSM/KG (ref 275–295)
PLATELET # BLD AUTO: 175 10(3)UL (ref 150–450)
POTASSIUM SERPL-SCNC: 4 MMOL/L (ref 3.5–5.1)
RBC # BLD AUTO: 3.76 X10(6)UL
SODIUM SERPL-SCNC: 138 MMOL/L (ref 136–145)
WBC # BLD AUTO: 4.3 X10(3) UL (ref 4–11)

## 2024-07-25 PROCEDURE — 70553 MRI BRAIN STEM W/O & W/DYE: CPT | Performed by: INTERNAL MEDICINE

## 2024-07-25 PROCEDURE — 99223 1ST HOSP IP/OBS HIGH 75: CPT | Performed by: OTHER

## 2024-07-25 PROCEDURE — 90792 PSYCH DIAG EVAL W/MED SRVCS: CPT | Performed by: OTHER

## 2024-07-25 RX ORDER — GADOTERATE MEGLUMINE 376.9 MG/ML
15 INJECTION INTRAVENOUS
Status: COMPLETED | OUTPATIENT
Start: 2024-07-25 | End: 2024-07-25

## 2024-07-25 RX ORDER — LAMOTRIGINE 100 MG/1
200 TABLET ORAL 2 TIMES DAILY
Status: DISCONTINUED | OUTPATIENT
Start: 2024-07-25 | End: 2024-07-28

## 2024-07-25 RX ORDER — LAMOTRIGINE 150 MG/1
150 TABLET ORAL 2 TIMES DAILY
Status: DISCONTINUED | OUTPATIENT
Start: 2024-07-25 | End: 2024-07-25

## 2024-07-25 RX ORDER — LACOSAMIDE 50 MG/1
150 TABLET ORAL 2 TIMES DAILY
Status: DISCONTINUED | OUTPATIENT
Start: 2024-07-25 | End: 2024-07-28

## 2024-07-25 RX ORDER — TRAZODONE HYDROCHLORIDE 100 MG/1
100 TABLET ORAL NIGHTLY PRN
Status: DISCONTINUED | OUTPATIENT
Start: 2024-07-25 | End: 2024-07-28

## 2024-07-25 NOTE — PHYSICAL THERAPY NOTE
PHYSICAL THERAPY EVALUATION - INPATIENT     Room Number: 3601/3601-A  Evaluation Date: 7/25/2024  Type of Evaluation: Initial  Physician Order: PT Eval and Treat    Presenting Problem: nonintractable episodic headache  Co-Morbidities : aseptic meningitis, fibromyalgia, Behects disease, Ehler-Danlos syndrom, psoraisis, HTN, BRISA, anxiety, depression, seizure disorder, ADHD  Reason for Therapy: Mobility Dysfunction and Discharge Planning    MRI brain 7/25/24:  CONCLUSION:    1. No acute infarct, acute intracranial hemorrhage, or hydrocephalus.   2. Mild to moderate chronic small vessel ischemic disease.         PHYSICAL THERAPY ASSESSMENT   Patient is currently functioning below baseline with bed mobility, transfers, gait, stair negotiation, maintaining seated position, standing prolonged periods, and performing household tasks.  Prior to admission, patient's baseline is independent with ADL, ambulation.  Patient is requiring supervision and contact guard assist as a result of the following impairments: decreased functional strength, decreased endurance/aerobic capacity, pain, impaired standing/ambulating balance, and decreased muscular endurance.  Physical Therapy will continue to follow for duration of hospitalization.    Patient will benefit from continued skilled PT Services at discharge to promote prior level of function.  Anticipate patient will return home with home health PT.    PLAN  PT Treatment Plan: Bed mobility;Body mechanics;Endurance;Energy conservation;Patient education;Family education;Gait training;Stair training;Transfer training;Balance training  Rehab Potential : Good  Frequency (Obs): 3-5x/week  Number of Visits to Meet Established Goals: 5      CURRENT GOALS    Goal #1 Patient is able to demonstrate supine - sit EOB @ level: modified independent     Goal #2 Patient is able to demonstrate transfers Sit to/from Stand at assistance level: modified independent     Goal #3 Patient is able to  ambulate 150 feet with assist device: walker - rolling at assistance level: modified independent     Goal #4 Pt to ascend/descend 8 steps with use of 1 rail and supervision   Goal #5    Goal #6    Goal Comments: Goals established on 2024      PHYSICAL THERAPY MEDICAL/SOCIAL HISTORY  History related to current admission: Patient is a 64 year old female admitted on 2024 from [facility] for head and neck pain.  Pt diagnosed with nonintractable episodic headache      HOME SITUATION  Type of Home: House   Home Layout: Two level  Stairs to Enter : 2  Railing: Yes  Stairs to Bedroom: 8  Railing: Yes    Lives With: Spouse  Drives: No  Patient Owned Equipment: Rolling walker       Prior Level of [place]: pt is normally independent with ADL, ambulation. Pt lives with spouse    SUBJECTIVE  Pt pleasant and cooperative.       OBJECTIVE  Precautions: Seizure (wears a cervical soft collar for support, sitter at bedside)  Fall Risk: High fall risk    WEIGHT BEARING RESTRICTION                   PAIN ASSESSMENT  Ratin  Location: headache  Management Techniques: Relaxation;Repositioning;Activity promotion;Body mechanics    COGNITION  Overall Cognitive Status:  WFL - within functional limits    RANGE OF MOTION AND STRENGTH ASSESSMENT  Upper extremity ROM and strength are within functional limits  See OT for evaluation    Lower extremity ROM is within functional limits  ROM WFL, however pt with reports of inc stiffness, specifically in the knees    Lower extremity strength is within functional limits  B LE strength 4/5      BALANCE  Static Sitting: Fair  Dynamic Sitting: Fair -  Static Standing: Poor +  Dynamic Standing: Poor    ADDITIONAL TESTS                                    ACTIVITY TOLERANCE  Pulse: 61  Heart Rate Source: Monitor                   O2 WALK  Oxygen Therapy  SPO2% on Room Air at Rest: 98    NEUROLOGICAL FINDINGS                        AM-PAC '6-Clicks' INPATIENT SHORT FORM - BASIC MOBILITY  How  much difficulty does the patient currently have...  Patient Difficulty: Turning over in bed (including adjusting bedclothes, sheets and blankets)?: A Little   Patient Difficulty: Sitting down on and standing up from a chair with arms (e.g., wheelchair, bedside commode, etc.): A Little   Patient Difficulty: Moving from lying on back to sitting on the side of the bed?: A Little   How much help from another person does the patient currently need...   Help from Another: Moving to and from a bed to a chair (including a wheelchair)?: A Little   Help from Another: Need to walk in hospital room?: A Little   Help from Another: Climbing 3-5 steps with a railing?: A Little       AM-PAC Score:  Raw Score: 18   Approx Degree of Impairment: 46.58%   Standardized Score (AM-PAC Scale): 43.63   CMS Modifier (G-Code): CK    FUNCTIONAL ABILITY STATUS  Gait Assessment   Functional Mobility/Gait Assessment  Gait Assistance: Supervision  Distance (ft): 25  Assistive Device: Rolling walker  Pattern: Ataxic;Shuffle    Skilled Therapy Provided     Bed Mobility:  Rolling: supervision  Supine to sit: supervision   Sit to supine: supervision     Transfer Mobility:  Sit to stand: CGA   Stand to sit: CGA  Gait = CGA with RW.     Therapist's Comments: PT orders received, chart reviewed, and RN approved PT session. Pt lying in bed with ice pack on head and reports of headache. Agreeable to PT. Pt with very slow mobility, tremulous in the B UE and LE (pt states due to medication). Pt instructed in proper hand placement with RW with sit<>stand, cued for upright posture in standing and ambulation, and instructed to keep RW within DIANDRA. Pt performed toileting with CGA and then returned to bed. Left in bed with all needs in reach. Advised pt to amb with RW with RN staff to bathroom and at least 3 times per day. Pt in understanding. RN notified of session.     Exercise/Education Provided:  Body mechanics  Energy conservation  Functional activity  tolerated  Gait training  Transfer training    Patient End of Session: In bed;Needs met;Call light within reach;With  staff;RN aware of session/findings;All patient questions and concerns addressed;Family present      Patient Evaluation Complexity Level:  History High - 3 or more personal factors and/or co-morbidities   Examination of body systems High - addressing a total of 4 or more elements   Clinical Presentation Moderate - Evolving   Clinical Decision Making Moderate - Evolving       PT Session Time: 40 minutes  Gait Training: 10 minutes  Therapeutic Activity: 10 minutes  Neuromuscular Re-education: 0 minutes  Therapeutic Exercise: 0 minutes

## 2024-07-25 NOTE — RESPIRATORY THERAPY NOTE
Patient is under SI precautions, I spoke to the nurse Isa about the patient's status. RUBI Moss spoke to her charge nurse and they both agreed to have a bedside sitter to watch the patient wear her CPAP.

## 2024-07-25 NOTE — OCCUPATIONAL THERAPY NOTE
OCCUPATIONAL THERAPY EVALUATION - INPATIENT     Room Number: 3601/3601-A  Evaluation Date: 7/25/2024  Type of Evaluation: Initial  Presenting Problem: severe HA    Physician Order: IP Consult to Occupational Therapy  Reason for Therapy: ADL/IADL Dysfunction and Discharge Planning    OCCUPATIONAL THERAPY ASSESSMENT   Patient is currently functioning near baseline with toileting, upper body dressing, lower body dressing, grooming, bed mobility, transfers, static sitting balance, dynamic sitting balance, static standing balance, dynamic standing balance, maintaining seated position, and functional standing tolerance. Prior to admission, patient's baseline is Mod I.  Patient is requiring minimum assistance as a result of the following impairments: decreased functional strength, decreased functional reach, decreased endurance, and pain. Occupational Therapy will continue to follow for duration of hospitalization.    Patient will benefit from continued skilled OT Services at discharge to promote prior level of function and safety with additional support and return home with home health OT      History Related to Current Admission: Patient is a 64 year old female admitted on 7/24/2024 with Presenting Problem: severe HA. Co-Morbidities : aseptic meningitis, fibromyalgia, Behects disease, Ehler-Danlos syndrom, psoraisis, HTN, BRISA, anxiety, depression, seizure disorder, ADHD    WEIGHT BEARING RESTRICTION                   Recommendations for nursing staff:   Transfers: CGA  Toileting location: toilet    EVALUATION SESSION:  Patient Start of Session: supine in bed for session  FUNCTIONAL TRANSFER ASSESSMENT  Sit to Stand: Edge of Bed  Edge of Bed: Contact Guard Assist  Toilet Transfer: Contact Guard Assist    BED MOBILITY  Rolling: Contact Guard Assist  Supine to Sit : Contact Guard Assist  Sit to Supine (OT): Contact Guard Assist  Scooting: CGA to EOB    BALANCE ASSESSMENT  Static Sitting: Contact Guard Assist  Sitting  Bilateral: Contact Guard Assist  Static Standing: Contact Guard Assist  Standing Bilateral: Contact Guard Assist (to amb in room and bathroom)    FUNCTIONAL ADL ASSESSMENT  LB Dressing Seated: Minimal Assist (for changing underwear)  Toileting Seated: Contact Guard Assist (at std toilet, extended time required for this and for all activities this session 2/2 pain and shakiness)      ACTIVITY TOLERANCE: vitals stable                         O2 SATURATIONS       COGNITION  Overall Cognitive Status:  WFL - within functional limits    Upper Extremity   ROM: within functional limits   Strength: within functional limits   Coordination  Gross motor: WNl  Fine motor: WNL  Sensation: Light touch:  intact    EDUCATION PROVIDED  Patient: Role of Occupational Therapy; Plan of Care  Patient's Response to Education: Verbalized Understanding; Returned Demonstration    Equipment used: RW  Demonstrates functional use, Would benefit from additional trial      Therapist comments: Pt required extended time for all actvities    Patient End of Session: Needs met;In bed;Call light within reach;With  staff;All patient questions and concerns addressed;Family present    OCCUPATIONAL PROFILE    HOME SITUATION  Type of Home: House  Home Layout: Two level  Lives With: Spouse    Toilet and Equipment: Standard height toilet  Shower/Tub and Equipment: Walk-in shower  Other Equipment: None          Drives: No       Prior Level of Function: Pt typically independent with ADLs and mobility. Pt does not use AD.    SUBJECTIVE   Pt stated, \"I am just in pain.\"    PAIN ASSESSMENT  Ratin  Location: HA  Management Techniques: Activity promotion;Body mechanics;Breathing techniques;Relaxation;Repositioning    OBJECTIVE  Precautions: Seizure (wears a cervical soft collar for support, sitter at bedside)  Fall Risk: High fall risk      ASSESSMENTS    AM-PAC ‘6-Clicks’ Inpatient Daily Activity Short Form  -   Putting on and taking off regular lower body  clothing?: A Little  -   Bathing (including washing, rinsing, drying)?: A Little  -   Toileting, which includes using toilet, bedpan or urinal? : A Little  -   Putting on and taking off regular upper body clothing?: A Little  -   Taking care of personal grooming such as brushing teeth?: A Little  -   Eating meals?: A Little    AM-PAC Score:  Score: 18  Approx Degree of Impairment: 46.65%  Standardized Score (AM-PAC Scale): 38.66    ADDITIONAL TESTS     NEUROLOGICAL FINDINGS      COGNITION ASSESSMENTS       PLAN  OT Treatment Plan: Balance activities;Energy conservation/work simplification techniques;ADL training;IADL training;Continued evaluation;Compensatory technique education;Neuromuscluar reeducation;Equipment eval/education;Patient/Family training;Cognitive reorientation;Endurance training;UE strengthening/ROM  Rehab Potential : Good  Frequency: 3-5x/week  Number of Visits to Meet Established Goals: 5    ADL Goals   Patient will perform upper body dressing:  with supervision  Patient will perform lower body dressing:  with supervision  Patient will perform toileting: with supervision    Functional Transfer Goals  Patient will transfer from supine to sit:  with supervision  Patient will transfer from sit to stand:  with supervision  Patient will transfer to toilet:  with supervision    UE Exercise Program Goal  Patient will be supervision with bilateral AROM HEP (home exercise program).    Additional Goals:  Pt will verbalize at least 3 energy conservation techniques  Pt will stand at sink for 5 minutes to complete grooming routine    Patient Evaluation Complexity Level:   Occupational Profile/Medical History LOW - Brief history including review of medical or therapy records    Specific performance deficits impacting engagement in ADL/IADL LOW  1 - 3 performance deficits    Client Assessment/Performance Deficits LOW - No comorbidities nor modifications of tasks    Clinical Decision Making LOW - Analysis of  occupational profile, problem-focused assessments, limited treatment options    Overall Complexity LOW     OT Session Time: 35 minutes  Self-Care Home Management: 15 minutes  Therapeutic Activity: 15 minutes  Neuromuscular Re-education: 0 minutes  Therapeutic Exercise: 0 minutes  Cognitive Skills: 0 minutes  Sensory Integrative: 0 minutes  Orthotic Management and Trainin minutes  Can add/delete any of these

## 2024-07-25 NOTE — PROGRESS NOTES
Assumed care at 7:30am  Patient is alert and oriented x4   at bedside in AM, son is now at bedside  Patient is able to walk with standby assist  Requesting PRNs for headaches, given as ordered.   Patient refusing Spinal Tap- neuro made aware  Called Security to get belongings from [facility]- will wait  Sitter 1:1  All safety precautions in place. Will continue to monitor patient.

## 2024-07-25 NOTE — PROGRESS NOTES
PSYCH CONSULT    Date of Admission: 7/24/2024  Date of Consult: 7/25/2024  Reason for Consultation: Suicide precautions    Impression:  Primary Psychiatric Diagnosis:  Major depressive disorder, recurrent, severe, without psychotic features. She has passive suicidal ideation at this time.     Secondary Psychiatric Diagnoses:  Generalized anxiety disorder.   Panic disorder.      Rule Out Diagnoses:  Bipolar depression.   Substance induced depressive disorder.     Personality Traits:  Deferred     Pertinent Medical Diagnoses:  Acute on chronic headaches and neck pain. Rule out meningitis- low suspicion at this time; patient declined LP due to concern about CSF leaking she had in the past. Hx Behcet's. Hx Ehler's-Danlos. Psoriasis. BRISA.     Recommendations:  1) Continue suicidal precautions and 1:1 sitter for now.    2) Continue Lamictal at higher dose of 200mg/100mg/200mg over 24 hrs for both mood and seizure prevention. Psychiatrist at [facility] talked with his outpatient neurologist about increasing dose of Lamictal and decreasing the dose of Lacosamide. The latter was causing her to feel more tired and have trouble concentrating.     3) Continue her other psych meds:  Adderall 20mg twice a day   Cymbalta 120mg daily  Provigil 200mg daily  Neurontin 600mg po twice a day  Xanax 1mg twice a day PRN anxiety  Trazodone 100mg nightly PRN insomnia    4) Once medically clear, transfer back to [facility] for further treatment of her major depression.     Full note to follow.    Dr. Jed Ga

## 2024-07-25 NOTE — PROGRESS NOTES
[facility]   part of [facility]     Hospitalist Progress Note     Vaishali Darling Patient Status:  Inpatient    1960 MRN [id number]   Location [facility] 3NE-A Attending Alexander Rubio,    Hosp Day # 2 PCP Kyung Girard MD     Chief Complaint: Headache    Subjective:   Patient states headache is better today. No vision change.  No numbness, tingling, weakness. No chest pain or shortness of breath. No nausea or vomiting. No BM.    Current medications:   docusate sodium  100 mg Oral BID    polyethylene glycol (PEG 3350)  17 g Oral Daily    lacosamide  150 mg Oral BID    lamoTRIgine  200 mg Oral BID    amphetamine-dextroamphetamine  20 mg Oral BID@0800,1200    aspirin  81 mg Oral Daily    atorvastatin  80 mg Oral Daily    colchicine  0.6 mg Oral BID    DULoxetine  120 mg Oral Daily    gabapentin  600 mg Oral BID    lamoTRIgine  100 mg Oral Noon    metoprolol succinate ER  25 mg Oral Daily Beta Blocker    modafinil  200 mg Oral Daily    pantoprazole  40 mg Oral BID AC    enoxaparin  40 mg Subcutaneous Daily    valACYclovir  500 mg Oral Nightly       Objective:    Review of Systems:   10 point ROS completed and was negative, except for pertinent positive and negatives stated in subjective.    Vital signs:  Temp:  [98 °F (36.7 °C)-98.8 °F (37.1 °C)] 98 °F (36.7 °C)  Pulse:  [50-84] 84  Resp:  [16-19] 16  BP: (127-138)/(61-84) 130/65  SpO2:  [96 %-98 %] 98 %  Patient Weight for the past 72 hrs:   Weight   24 0345 144 lb 2.9 oz (65.4 kg)     Physical Exam:    General: No acute distress.   Respiratory: Clear to auscultation bilaterally.   Cardiovascular: S1, S2. Regular rate and rhythm.   Abdomen: Soft, nontender, nondistended.  Positive bowel sounds.   Extremities: No edema.  Neuro: Alert and awake, moves all ext    Diagnostic Data:    Labs:  Recent Labs   Lab 24  1906 24  0519 24  1458 24  0734   WBC 6.4 5.2 5.2 4.3   HGB 13.4 13.3 13.8 12.5   MCV 93.6 94.6 95.2 96.0    .0 188.0 193.0 175.0       Recent Labs   Lab 07/20/24  1906 07/23/24  0519 07/24/24  1458 07/25/24  0734   GLU 89 88 92 88   BUN 12 10 11 10   CREATSERUM 0.77 0.70 0.80 0.72   CA 9.1 9.2 9.9 8.9   ALB 4.5 4.1 4.3  --    * 135* 135* 138   K 4.3 4.3 4.5 4.0    103 104 106   CO2 23.0 26.0 28.0 24.0   ALKPHO 51 48* 53  --    AST 36* 27 29  --    ALT 28 22 23  --    BILT 0.2 0.3 0.3  --    TP 6.9 6.3 6.8  --        Estimated Creatinine Clearance: 68.2 mL/min (based on SCr of 0.72 mg/dL).    No results for input(s): \"PTP\", \"INR\" in the last 168 hours.         COVID-19 Lab Results    COVID-19  Lab Results   Component Value Date    COVID19 Not Detected 07/20/2024    COVID19 NOT DETECTED 04/30/2021    COVID19 NOT DETECTED 01/15/2021       Pro-Calcitonin  No results for input(s): \"PCT\" in the last 168 hours.    Cardiac  No results for input(s): \"TROP\", \"PBNP\" in the last 168 hours.    Creatinine Kinase  Recent Labs   Lab 07/25/24  0734   CK 37       Inflammatory Markers  Recent Labs   Lab 07/24/24  1458   CRP <0.40       Recent Labs   Lab 07/25/24  0734   CK 37       Imaging: Imaging data reviewed in Epic.    Medications:    docusate sodium  100 mg Oral BID    polyethylene glycol (PEG 3350)  17 g Oral Daily    lacosamide  150 mg Oral BID    lamoTRIgine  200 mg Oral BID    amphetamine-dextroamphetamine  20 mg Oral BID@0800,1200    aspirin  81 mg Oral Daily    atorvastatin  80 mg Oral Daily    colchicine  0.6 mg Oral BID    DULoxetine  120 mg Oral Daily    gabapentin  600 mg Oral BID    lamoTRIgine  100 mg Oral Noon    metoprolol succinate ER  25 mg Oral Daily Beta Blocker    modafinil  200 mg Oral Daily    pantoprazole  40 mg Oral BID AC    enoxaparin  40 mg Subcutaneous Daily    valACYclovir  500 mg Oral Nightly       Assessment & Plan:    Head & neck pain with photophobia, CTb and CT cspine without acute process, MRI neg    History of meningitis, declined LP  Chronic headaches  Pain control  Await  decision re: LP  Neurology consult   Seizure disorder   AED per Neuro  Seizure precautions  Suicidal ideation  Depression  Anxiety  Sitter  Per psychiatry  Behcets disease   Chris-Danlos syndrome   Colchicine  Chronic diastolic heart failure  Essential hypertension  Dyslipidemia   Aspirin  Toprol  Lipitor  Constipation  Bowel care  GERD  PPI  BRISA    Supplementary Documentation:   Quality:  DVT Prophylaxis: Lovenox     At this point Ms. Darling is expected to be discharge to: [facility]    Plan of care discussed with patient.    Tarik Hills MD        **Certification      PHYSICIAN Certification of Need for Inpatient Hospitalization - Initial Certification    Patient will require inpatient services that will reasonably be expected to span two midnight's based on the clinical documentation in H+P.   Based on patients current state of illness, I anticipate that, after discharge, patient will require TBD.

## 2024-07-25 NOTE — CONSULTS
[facility]  Report of Psychiatric Consultation    Vaishali Darling Patient Status:  Inpatient    1960 MRN [id number]   Formerly [facility] 3NE-A Attending Tarik Hills MD   Hosp Day # 1 PCP Kyung Girard MD     Date of Admission: 2024  Date of Consult: 2024  Reason for Consultation: Suicide precautions    Impression:  Primary Psychiatric Diagnosis:  Major depressive disorder, recurrent, severe, without psychotic features. She has passive suicidal ideation at this time.     Secondary Psychiatric Diagnoses:  Generalized anxiety disorder.   Panic disorder.      Rule Out Diagnoses:  Bipolar depression.   Substance induced depressive disorder.     Personality Traits:  Deferred     Pertinent Medical Diagnoses:  Acute on chronic headaches and neck pain. Rule out meningitis- low suspicion at this time; patient declined LP due to concern about CSF leaking she had in the past. Hx Behcet's. Hx Ehler's-Danlos. Psoriasis. BRISA.     Recommendations:  1) Continue suicidal precautions and 1:1 sitter for now.    2) Continue Lamictal at higher dose of 200mg/100mg/200mg over 24 hrs for both mood and seizure prevention. Psychiatrist at [facility] talked with his outpatient neurologist about increasing dose of Lamictal and decreasing the dose of Lacosamide. The latter was causing her to feel more tired and have trouble concentrating.     3) Continue her other psych meds:  Adderall 20mg twice a day   Cymbalta 120mg daily  Provigil 200mg daily  Neurontin 600mg po twice a day  Xanax 1mg twice a day PRN anxiety  Trazodone 100mg nightly PRN insomnia    4) Once medically clear, transfer back to [facility] for further treatment of her major depression.     Jed Ga MD  2024  2:45 PM    History of Present Illness:  65 yo patient with complicated med and psych hx including neuro-Behcet's and a hx of aseptic meningitis over 20 yrs ago and Ehler's Danlos syndrome contributing to chronic shoulder and neck pain was  initially admitted to [facility] on 7/22/24 for her severe major depression with suicidal ideation and transient thoughts of killing herself by driving her car off a charla like the movie Barbie and Destiny. She had decreased functioning due to her lack of motivation and increased apathy. She felt hopeless and helpless and depressed. She was isolating herself from others and breaking down crying. She just sat and watched TV all day. Her chronic neck and shoulder pain (on chronic opioids) and her seizure disorder causing her not to be able to drive contributed to her depression. Not being able to care for her 2 month old grandson was devastated. She felt a \"LOSS OF CONTROL\" and \"LOSS OF PURPOSE\" and thought it was easier being dead. She has OCPD traits at baseline per patient and her . Other recent stressors: 's prostate CA, daughter's difficult pregnancy 2 months ago with preeclampsia.     At [facility], the psych service communicated with her outpatient neurologist and discussed decreasing Lacosamide due to concern for neurocognitive side effects and increasing Lamictal to help with mood and seizure prevention. Lamictal was increased from 150mg/100mg/150mg per day to 200mg/100mg/200mg per day. Provigil 200mg daily was also started to help with energy and mood.    On 7/24/24, she c/o increased headache (her baseline 4/10 went to 10/10) and neck pain and photophobia. She mentioned how she has over 10 bouts of aseptic meningitis over 20 yrs ago, so she was sent to the ED for further workup.     During this admission, she had a Brain MRI that was neg for acute stroke and showed signs of small vessel ischemic disease. She declined getting a LP because of a past hx of CSF leaks causing worsening headache. Neuro decreased her Lacosamide from 200mg to 150mg twice a day to decreased the neurocognitive side effects (fatigue, poor concentration). She is getting opioids prn pain. Also empirically started on  Acyclovir. Her headache, neck pain, and photophobia have all improved/decreased the past 24 hrs.     Currently, she feels anxious, depressed, helpless (\"no control\"), and hopeless with passive suicidal ideation. She feels worthless and purposeless at this time. She has fatigue and low motivation and poor concentration and anhedonia.     Psychiatry Review Systems: No voices or visions. No elevated mood, racing thoughts, decreased need for sleep, grandiose thoughts.    Past Psychiatric History:  Per Dr. Amador's psych eval on 7/23/24:  \"Previous dx: MDD, CANDICE   Past inpt: x3 when she was young for MDD  RTC: denies  Past suicide attempts: denies  Current outpatient psychiatric provider: SHAWN Robison  Current outpatient therapist: Nubia Jimenez at [place] with [facility]     Current Medications:   Adderall 20 mg BID- helpful   Cymbalta 120 mg daily- helpful for depression   Xanax 1 mg- helpful for panic   Gabapentin 600 mg BID- for seizures  Lamictal 250 Q AM & 150 mg Q HS- for seizures      Past Medication trials:   Amitriptyline -helpful for a long time   One antidepressant (cannot remember name)- made her uncomfortable in her skin\"    Social and Developmental History:  Per Dr. Amador's psych eval on 7/23/24:   \"Currently lives in [place] with her 3 adult children (ages 30, 32, 29), 2 month old grandchild, and  of 33 years. One dog and 2 cats that belong to her daughter. 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  is a  and owns a business. Their 2 daughters work for him. Son is a . Patient used to volunteer at a food pantry but now is not able to due to her condition. Denies any legal issues. Denies any illicit substance use.\"     Past Medical History:    Anxiety state, unspecified    Arthritis    Back problem    SPASMS HERNIATED DISC  IN C SPINE    Behcet's disease (HCC)    NEURO, BRAIN, GI  AND SKIN INVOLVEMENT     Binge eating    Carpal tunnel syndrome, bilateral    Cervical disc disease    MRI [facility] 11-15, jw C6-7    CHF (congestive heart failure) (HCC)    Cholelithiasis    CONGESTIVE HEART FAILURE       being controled - has issue with edema in lower extremities    Depression    Diastolic dysfunction    2-D echocardiogram normal 2015 at [facility]    Diverticulosis of colon (without mention of hemorrhage)    Chris-Danlos syndrome type III (HCC)    hypermobile    Fibromyalgia    GERD (gastroesophageal reflux disease)    Glenohumeral arthritis, right    MRI [facility] 11-15; sees Dr. Serrato- ortho    Hammertoe    Hand fracture    L 5th digit    Hearing impairment    bilateral hearing aids    Hepatitis A    LONG AGO    Hepatitis, unspecified    Hep A    High blood pressure    Pt denies    Hypercholesteremia    Hypertension    Hypertriglyceridemia    Impaired glucose tolerance    Migraines    Morbid obesity with BMI of 45.0-49.9, adult (HCC)    Miller's neuroma    Neuropathy    toes and left foot    BRISA on CPAP    Osteoarthritis    generalized    Pancreatic divisum    MRCP 2015    Pneumonia, organism unspecified(486)    Pre-diabetes    Seizure disorder (HCC)    olefactory seizures history not since age 26    Spondylosis of cervical region without myelopathy or radiculopathy    MRI [facility] 11-15    Unspecified sleep apnea    BIPAP    Visual impairment    glasses     Past Surgical History:   Procedure Laterality Date    Appendectomy      Arthroscopy of joint unlisted Bilateral     right shoulder and both knees      1994 x2, 1992 x 1    total of 3    Cholecystectomy      Colonoscopy      Egd N/A 2020    Procedure: ESOPHAGOGASTRODUODENOSCOPY, COLONOSCOPY, POSSIBLE BIOPSY, POSSIBLE POLYPECTOMY 48043, 95350;  Surgeon: Kwabena Mayer MD;  Location: [facility]    Hernia surgery  10/22/2019    Umb hernia repair/ASc-Dr Adan    Other      , and 2014- deborah joint toes/ had bone grafts with stem cells  recreated both toes/        Other  had numerous surgeries    Great toe replacements removed and then reconstructed     Other      reconstructive surgery on left foot with hardware then it was removed    Other Right     tkr    Other  12/2018    Revision 5th metatarsal fusion    Other surgical history      BILATERAL TOE joint REPLACEMENT-2006     Other surgical history      right shoulder aa/dcr/debridement rotator cuff     Other surgical history  1/2017    L shoulder replacement    Other surgical history      R foot surgery x4    Tonsillectomy       Family History   Problem Relation Age of Onset    Diabetes Father     Cancer Father         prostate cancer    Lung Disorder Mother         COPD      reports that she quit smoking about 33 years ago. Her smoking use included cigarettes. She started smoking about 38 years ago. She has a 2.5 pack-year smoking history. She has never used smokeless tobacco. She reports that she does not drink alcohol and does not use drugs.    Allergies:  Allergies   Allergen Reactions    Latex ITCHING and SWELLING     blisters  redness    Tequin [Gatifloxacin] ANAPHYLAXIS     Facial swelling 5/03      Adhesive Tape RASH     Adhesive tape Tears skin Redness and blistering  Paper tape and tegaderm OK    Imitrex [Sumatriptan]      palpitations       Medications:    Current Facility-Administered Medications:     ALPRAZolam (Xanax) tab 1 mg, 1 mg, Oral, BID PRN    amphetamine-dextroamphetamine (Adderall) tab 20 mg, 20 mg, Oral, BID@0800,1200    aspirin DR tab 81 mg, 81 mg, Oral, Daily    ketorolac (Toradol) 15 MG/ML injection 15 mg, 15 mg, Intravenous, Q6H PRN    butalbital-acetaminophen-caffeine (Fioricet) -40 MG per tab 1 tablet, 1 tablet, Oral, Q4H PRN    atorvastatin (Lipitor) tab 80 mg, 80 mg, Oral, Daily    colchicine tab 0.6 mg, 0.6 mg, Oral, BID    cyclobenzaprine (Flexeril) tab 10 mg, 10 mg, Oral, TID PRN    DULoxetine (Cymbalta) DR cap 120 mg, 120 mg, Oral, Daily    gabapentin  (Neurontin) cap 600 mg, 600 mg, Oral, BID    lacosamide (Vimpat) tab 200 mg, 200 mg, Oral, BID    lamoTRIgine (LaMICtal) tab 100 mg, 100 mg, Oral, Noon    lamoTRIgine (LaMICtal) tab 200 mg, 200 mg, Oral, BID    metoprolol succinate ER (Toprol XL) 24 hr tab 25 mg, 25 mg, Oral, Daily Beta Blocker    modafinil (Provigil) tab 200 mg, 200 mg, Oral, Daily    pantoprazole (Protonix) DR tab 40 mg, 40 mg, Oral, BID AC    oxyCODONE-acetaminophen (Percocet)  MG per tab 1 tablet, 1 tablet, Oral, Q8H PRN    HYDROmorphone (Dilaudid) 1 MG/ML injection 1 mg, 1 mg, Intravenous, Q4H PRN    traZODone (Desyrel) tab 100 mg, 100 mg, Oral, Nightly    enoxaparin (Lovenox) 40 MG/0.4ML SUBQ injection 40 mg, 40 mg, Subcutaneous, Daily    acetaminophen (Tylenol Extra Strength) tab 500 mg, 500 mg, Oral, Q4H PRN    melatonin tab 3 mg, 3 mg, Oral, Nightly PRN    polyethylene glycol (PEG 3350) (Miralax) 17 g oral packet 17 g, 17 g, Oral, Daily PRN    sennosides (Senokot) tab 17.2 mg, 17.2 mg, Oral, Nightly PRN    bisacodyl (Dulcolax) 10 MG rectal suppository 10 mg, 10 mg, Rectal, Daily PRN    fleet enema (Fleet) 7-19 GM/118ML rectal enema 133 mL, 1 enema, Rectal, Once PRN    ondansetron (Zofran) 4 MG/2ML injection 4 mg, 4 mg, Intravenous, Q6H PRN    prochlorperazine (Compazine) 10 MG/2ML injection 5 mg, 5 mg, Intravenous, Q8H PRN    benzonatate (Tessalon) cap 200 mg, 200 mg, Oral, TID PRN    guaiFENesin (Robitussin) 100 MG/5 ML oral liquid 200 mg, 200 mg, Oral, Q4H PRN    glycerin-hypromellose- (Artificial Tears) 0.2-0.2-1 % ophthalmic solution 1 drop, 1 drop, Both Eyes, QID PRN    sodium chloride (Saline Mist) 0.65 % nasal solution 1 spray, 1 spray, Each Nare, Q3H PRN    valACYclovir (Valtrex) tab 500 mg, 500 mg, Oral, Nightly    Review of Systems   Constitutional:  Positive for malaise/fatigue.   Psychiatric/Behavioral:  Positive for depression and suicidal ideas. Negative for hallucinations. The patient is nervous/anxious.       Mental Status Exam:     Objective      Vitals:    07/25/24 1200   BP:    Pulse: 61   Resp:    Temp:      Appearance: fair grooming  Behavior: mild fidgeting, repositioning; cooperative  Gait: not observed    Speech: normal rate, rhythm and volume    Mood: depressed and anxious  Affect: Congruent    Thought process: logical and sequential  Thought content: ruminations  Perceptions: no hallucinations  Associations: Intact    Orientation: Alert and oriented x 4  Attention and Concentration: focused and attentive  Memory:  intact immediate, recent, remote  Language: Intact naming and repetition  Fund of Knowledge: Able to recite name of [place] president    Insight: fair  Judgment: fair    Patient Strengths/Assets: smart, caring     Suicide Risk Assessments:  Overall level of suicide risk is HIGH     Risk Factors: suicidal ideation, severe depression, chronic pain     Environmental Factors: unable to care for her 2 month old grandson because of her medical issues    Protective Factors: family     Laboratory Data:  Lab Results   Component Value Date    WBC 4.3 07/25/2024    HGB 12.5 07/25/2024    HCT 36.1 07/25/2024    .0 07/25/2024    CREATSERUM 0.72 07/25/2024    BUN 10 07/25/2024     07/25/2024    K 4.0 07/25/2024     07/25/2024    CO2 24.0 07/25/2024    GLU 88 07/25/2024    CA 8.9 07/25/2024    ALB 4.3 07/24/2024    ALKPHO 53 07/24/2024    BILT 0.3 07/24/2024    TP 6.8 07/24/2024    AST 29 07/24/2024    ALT 23 07/24/2024    CRP <0.40 07/24/2024       STUDIES:    7/25/2024  Narrative   PROCEDURE:  MRI BRAIN (W+WO) (CPT=70553)     COMPARISON:  None.     INDICATIONS:  Intractable headache, hx of meningitis     TECHNIQUE:  MRI of the brain was performed with multi-planar T1, T2-weighted images with FLAIR sequences and diffusion weighted images without and with infusion.     PATIENT STATED HISTORY:(As transcribed by Technologist)  Pt stated that that she has a headache and neck pain.      CONTRAST USED:   12 mL of Dotarem     FINDINGS:    The ventricles and sulci are within normal limits.  There is no midline shift or mass effect.  The basal cisterns are patent.       There is no acute intracranial hemorrhage or extra-axial fluid collection identified.  Mild to moderate chronic small vessel ischemic disease.  There is no abnormal parenchymal or leptomeningeal enhancement.  There is no restricted diffusion to suggest  acute ischemia/infarction.     The visualized paranasal sinuses and mastoid air cells are unremarkable.  The expected major intracranial flow voids are present.      Impression   CONCLUSION:    1. No acute infarct, acute intracranial hemorrhage, or hydrocephalus.  2. Mild to moderate chronic small vessel ischemic disease.

## 2024-07-25 NOTE — PROGRESS NOTES
NURSING ADMISSION NOTE      Patient admitted via Cart  Oriented to room.  Safety precautions initiated.  Bed in low position.  Call light in reach.    A&Ox4. On RA. NSR/SB on tele. VSS. Seizure and SI precautions in place. 1:1 sitter at the bedside. Call light within reach, bed in the lowest position. Will continue with plan of care.

## 2024-07-25 NOTE — CONSULTS
[facility]   NEUROLOGY   CONSULT NOTE    Admission date: 7/24/2024  Reason for Consult: Headache/stiff neck.  Chief Complaint:   Chief Complaint   Patient presents with    Neck Pain     Stiff neck   ________________________________________________________________    History     History of Presenting Illness  64 year old female with multiple medical problems including previous diagnosis of fibromyalgia, Chris-Danlos syndrome, psoriasis, chronic pain, BRISA on CPAP, hypertension, hyperlipidemia, anxiety/depression, ADHD, seizure disorder as well as Paget's disease with history of multiple episodes of aseptic meningitis, was in [facility] [place] for anxiety and depression when she started complaining of neck pain and headache.  She also has history of cervical spine disease.  She describes current pains as starting in her neck with radiation to her occipital region all the way to the temporal and then in the forehead region.  There is mild photophobia but no phonophobia.  He also complains of mild nausea but no vomiting.  There is no diplopia, dysarthria, dysphagia or new weakness in the upper or lower extremities.  Patient has had multiple joint replacements and has been on multiple pain medications for generalized body aches and pain.    History obtained from patient, her  and chart review.    Past Medical History:    Anxiety state, unspecified    Arthritis    Back problem    SPASMS HERNIATED DISC  IN C SPINE    Behcet's disease ([facility])    NEURO, BRAIN, GI  AND SKIN INVOLVEMENT    Binge eating    Carpal tunnel syndrome, bilateral    Cervical disc disease    MRI [facility] 11-15, jw C6-7    CHF (congestive heart failure) ([facility])    Cholelithiasis    CONGESTIVE HEART FAILURE      2014 being controled - has issue with edema in lower extremities    Depression    Diastolic dysfunction    2-D echocardiogram normal 2/2015 at [place] [facility]    Diverticulosis of colon (without mention of hemorrhage)    Chris-Danlos  syndrome type III (HCC)    hypermobile    Fibromyalgia    GERD (gastroesophageal reflux disease)    Glenohumeral arthritis, right    MRI [facility] 11-15; sees Dr. Serrato- ortho    Hammertoe    Hand fracture    L 5th digit    Hearing impairment    bilateral hearing aids    Hepatitis A    LONG AGO    Hepatitis, unspecified    Hep A    High blood pressure    Pt denies    Hypercholesteremia    Hypertension    Hypertriglyceridemia    Impaired glucose tolerance    Migraines    Morbid obesity with BMI of 45.0-49.9, adult (HCC)    Miller's neuroma    Neuropathy    toes and left foot    BRISA on CPAP    Osteoarthritis    generalized    Pancreatic divisum    MRCP 2015    Pneumonia, organism unspecified(486)    Pre-diabetes    Seizure disorder (HCC)    olefactory seizures history not since age 26    Spondylosis of cervical region without myelopathy or radiculopathy    MRI [facility] 11-15    Unspecified sleep apnea    BIPAP    Visual impairment    glasses     Past Surgical History:   Procedure Laterality Date    Appendectomy      Arthroscopy of joint unlisted Bilateral     right shoulder and both knees      1994 x2, 1992 x 1    total of 3    Cholecystectomy      Colonoscopy      Egd N/A 2020    Procedure: ESOPHAGOGASTRODUODENOSCOPY, COLONOSCOPY, POSSIBLE BIOPSY, POSSIBLE POLYPECTOMY 46471, 75830;  Surgeon: Kwabena Mayer MD;  [facility]: [facility]    Hernia surgery  10/22/2019    Umb hernia repair/ASc-Dr Adan    Other      , and 2014- deborah joint toes/ had bone grafts with stem cells recreated both toes/        Other  had numerous surgeries    Great toe replacements removed and then reconstructed     Other      reconstructive surgery on left foot with hardware then it was removed    Other Right     tkr    Other  2018    Revision 5th metatarsal fusion    Other surgical history      BILATERAL TOE joint REPLACEMENT-     Other surgical history      right shoulder aa/dcr/debridement rotator cuff      Other surgical history  2017    L shoulder replacement    Other surgical history      R foot surgery x4    Tonsillectomy       Social History     Socioeconomic History    Marital status:    Tobacco Use    Smoking status: Former     Current packs/day: 0.00     Average packs/day: 0.5 packs/day for 5.0 years (2.5 ttl pk-yrs)     Types: Cigarettes     Start date: 1986     Quit date: 1991     Years since quittin.5    Smokeless tobacco: Never   Vaping Use    Vaping status: Never Used   Substance and Sexual Activity    Alcohol use: No     Alcohol/week: 0.0 standard drinks of alcohol    Drug use: No     Social Determinants of Health     Financial Resource Strain: Low Risk  (2024)    Financial Resource Strain     Med Affordability: No   Food Insecurity: No Food Insecurity (2024)    Food Insecurity     Food Insecurity: Never true   Transportation Needs: No Transportation Needs (2024)    Transportation Needs     Lack of Transportation: No   Physical Activity: High Risk (2023)    Received from [facility]    Exercise Vital Sign     On average, how many days per week do you engage in moderate to strenuous exercise (like a brisk walk)?: 0 days     On average, how many minutes do you engage in exercise at this level?: 0 min   Stress: Stress Concern Present (1/3/2022)    Received from [facility] - Occupational Stress Questionnaire     Feeling of Stress : To some extent   Social Connections: Low Risk  (2023)    Received from [facility]    Social Connections     How often do you see or talk to people that you care about and feel close to? (For example: talking to friends on the phone, visiting friends or family, going to [employer] or club meetings): 5 or more times a week   Housing Stability: Low Risk  (2024)    Housing Stability     Housing Instability: No     Family  History   Problem Relation Age of Onset    Diabetes Father     Cancer Father         prostate cancer    Lung Disorder Mother         COPD     Allergies   Allergies   Allergen Reactions    Latex ITCHING and SWELLING     blisters  redness    Tequin [Gatifloxacin] ANAPHYLAXIS     Facial swelling 5/03      Adhesive Tape RASH     Adhesive tape Tears skin Redness and blistering  Paper tape and tegaderm OK    Imitrex [Sumatriptan]      palpitations       Home Meds  Current Outpatient Medications   Medication Instructions    ALPRAZolam (XANAX) 1 mg, Oral, 2 times daily PRN    amphetamine-dextroamphetamine (ADDERALL) 20 MG Oral Tab 20 mg, Oral, 2 times daily    aspirin-acetaminophen-caffeine 250-250-65 MG Oral Tab 2 tablets, Oral, Every 4 hours PRN    aspirin 81 mg, Oral, Daily    atorvastatin (LIPITOR) 80 mg, Oral, Daily    B Complex-C-Folic Acid Oral Tab 2 tablets, Oral, Nightly    BUTALBITAL-ASPIRIN-CAFFEINE -40 MG Oral Cap TAKE 1 CAPSULE BY MOUTH EVERY SIX HOURS AS NEEDED FOR PAIN    Calcium Carbonate-Vitamin D (CALCIUM 600-VITAMIN D3 OR) 1 tablet, Oral, Daily    Clobetasol Propionate 0.05 % External Solution Apply to affected areas of scalp, ears daily as needed    colchicine 0.6 mg, Oral, 2 times daily    cyclobenzaprine (FLEXERIL) 10 mg, Oral, 3 times daily PRN    diclofenac (VOLTAREN) 75 mg, Oral, 2 times daily    DULoxetine (CYMBALTA) 120 mg, Oral, Daily    FREESTYLE LITE TEST In Vitro Strip TEST SUGARS EVERY MORNING, ALTERNATING WITH 2 HOURS POST PRANDIAL DAILY    gabapentin (NEURONTIN) 300 mg, Oral, 2 times daily    lacosamide (VIMPAT) 200 mg, Oral, 2 times daily    lamoTRIgine (LAMICTAL) 150 mg, Oral, 2 times daily    lamoTRIgine (LAMICTAL) 100 mg, Oral, Daily, Take at noon    metoprolol succinate ER (TOPROL XL) 25 mg, Oral, Daily    modafinil (PROVIGIL) 200 mg, Oral, Daily    Multiple Vitamins-Minerals (CENTRUM SILVER 50+WOMEN OR) 1 tablet, Oral, Daily    Norethindrone-Eth Estradiol 1-5 MG-MCG Oral Tab 1  tablet, Oral, Daily    Omeprazole 40 mg, Oral, Daily    ondansetron 4 MG Oral Tablet Dispersible Take 2 tablets (8 mg total) by mouth every 12 (twelve) hours as needed for Nausea.    oxyCODONE-acetaminophen  MG Oral Tab 1 tablet, Oral, Every 8 hours PRN    Polyethylene Glycol 3350 (SM CLEARLAX) Oral Powder MIX 17 GRAMS IN LIQUID AND DRINK DAILY    PREBIOTIC PRODUCT OR 1 capsule, Oral, Daily    Probiotic Product (PROBIOTIC OR) 1 capsule, Oral, Daily    traZODone 50 MG Oral Tab TAKE 1 TABLET(50 MG) BY MOUTH at bedtime as needed    VALACYCLOVIR 500 MG Oral Tab TAKE ONE TABLET BY MOUTH ONE TIME DAILY    Wheat Dextrin (BENEFIBER) Oral Powder Use 1 scoop daily in 8 ounces of your desired beverage    zinc sulfate (ZINCATE) 220 mg, Oral, Daily     Scheduled Meds:   amphetamine-dextroamphetamine  20 mg Oral BID@0800,1200    aspirin  81 mg Oral Daily    atorvastatin  80 mg Oral Daily    colchicine  0.6 mg Oral BID    DULoxetine  120 mg Oral Daily    gabapentin  600 mg Oral BID    lacosamide  200 mg Oral BID    lamoTRIgine  100 mg Oral Noon    lamoTRIgine  200 mg Oral BID    metoprolol succinate ER  25 mg Oral Daily Beta Blocker    modafinil  200 mg Oral Daily    pantoprazole  40 mg Oral BID AC    traZODone  100 mg Oral Nightly    enoxaparin  40 mg Subcutaneous Daily    valACYclovir  500 mg Oral Nightly     Continuous Infusions:  PRN Meds:  ALPRAZolam    ketorolac    butalbital-acetaminophen-caffeine    cyclobenzaprine    oxyCODONE-acetaminophen    HYDROmorphone    acetaminophen    melatonin    polyethylene glycol (PEG 3350)    sennosides    bisacodyl    fleet enema    ondansetron    prochlorperazine    benzonatate    guaiFENesin    glycerin-hypromellose-    sodium chloride    OBJECTIVE   VITAL SIGNS:   Temp:  [97.8 °F (36.6 °C)-98.1 °F (36.7 °C)] 98 °F (36.7 °C)  Pulse:  [46-61] 61  Resp:  [16-20] 16  BP: ()/(68-91) 127/75  SpO2:  [94 %-100 %] 96 %    PHYSICAL EXAM:    NEUROLOGIC:    Mental Status:  A&O x  4, Follows simple commands, no obvious aphasia or dysarthria  Cranial nerves: PERRL.  Visual fields full.  EOMI.  Face symmetric with normal movement bilaterally.  Hearing grossly intact. Tongue midline with normal movements.   Motor: Limited exam.  Patient complains of pain on movement.  Generally strength symmetrical in upper and lower extremities, reflexes also symmetrical and plantars downgoing.    Sensation: Intact to light touch bilaterally  Cerebellar: Normal Finger-To-Nose test  Cervical exam.  Patient wearing soft cervical collar.  Denies Lhermitte's phenomena.  Complains of pain on mild flexion.      LABORATORY DATA:  Last 24 hour labs were reviewed in detail.  Recent Labs   Lab 07/23/24  0519 07/24/24  1458 07/25/24  0734   * 135* 138   K 4.3 4.5 4.0    104 106   CO2 26.0 28.0 24.0   GLU 88 92 88   BUN 10 11 10   CREATSERUM 0.70 0.80 0.72     Recent Labs   Lab 07/23/24  0519 07/24/24  1458 07/25/24  0734   WBC 5.2 5.2 4.3   HGB 13.3 13.8 12.5   .0 193.0 175.0     Recent Labs   Lab 07/20/24  1906 07/23/24  0519 07/24/24  1458   ALT 28 22 23   AST 36* 27 29     No results for input(s): \"MG\", \"PHOS\" in the last 168 hours.  Last A1c value was 5.5% done 7/11/2024.     Radiology:    MRI BRAIN (W+WO) (CPT=70553)    Result Date: 7/25/2024  CONCLUSION:  1. No acute infarct, acute intracranial hemorrhage, or hydrocephalus. 2. Mild to moderate chronic small vessel ischemic disease.   LOCATION:  [facility]    Dictated by (CST): Stromberg, LeRoy, MD on 7/25/2024 at 7:40 AM     Finalized by (CST): Stromberg, LeRoy, MD on 7/25/2024 at 7:45 AM      ASSESSMENT/PLAN   64 year old female with:    Patient with headache and neck pain with radiation of pain to the temple region and frontal region along with some photophobia and occasional nausea.  Findings seems more consistent with tension type headache.  However patient has history of recurrent aseptic meningitis due to her underlying Behcet's disease, as  diagnosed last time 25 years ago.  Patient was strongly advised spinal tap.  However, patient has refused spinal tap so far.  Patient was counseled that a lot of testing is available at this time compared to 20 years ago for evaluation of spinal tap.  Patient has not had a significant worsening in the last 24 hours and my index of suspicion for viral encephalitis is low.  Also MRI of the brain with and without contrast did not show any significant meningeal enhancement.  Awaiting family to decide regarding spinal tap.  History of seizures.  The patient apparently had workup at [facility] and was started on Vimpat 200 mg twice daily on top of Lamictal 100 mg twice daily for her grand mal seizures.  Patient's been complaining of side effects related to Vimpat, making her very lethargic and low in energy.  She would like to discontinue Vimpat. I have advised that we will decrease the dose to 150 mg twice daily for now.  Patient can then follow-up with her neurologist in [place] for further recommendations.  Generalized pain.  Patient requesting more stronger pain medications.  Defer this to the hospitalist.  I am requesting creatinine kinase to assess for any myopathic process.  Patient and family members counseled multiple times all questions were answered in detail nature of spinal tap, risk of spinal leaks status post spinal tap as well as urgency to perform the test was explained to the patient and her .  Risks and benefits detail discussed in detail.        Principal Problem:    Nonintractable episodic headache, unspecified headache type       Angel Chao MD  Neurohospitalist  [facility]    Disclaimer: This record was dictated using Dragon software. There may be errors due to voice recognition problems that were not realized and corrected during the completion of the note.    Please

## 2024-07-26 PROCEDURE — 99232 SBSQ HOSP IP/OBS MODERATE 35: CPT | Performed by: HOSPITALIST

## 2024-07-26 PROCEDURE — 99232 SBSQ HOSP IP/OBS MODERATE 35: CPT

## 2024-07-26 RX ORDER — DOCUSATE SODIUM 100 MG/1
100 CAPSULE, LIQUID FILLED ORAL 2 TIMES DAILY
Status: DISCONTINUED | OUTPATIENT
Start: 2024-07-26 | End: 2024-07-28

## 2024-07-26 RX ORDER — DICLOFENAC SODIUM 75 MG/1
75 TABLET, DELAYED RELEASE ORAL 2 TIMES DAILY
Status: DISCONTINUED | OUTPATIENT
Start: 2024-07-26 | End: 2024-07-28

## 2024-07-26 RX ORDER — POLYETHYLENE GLYCOL 3350 17 G/17G
17 POWDER, FOR SOLUTION ORAL DAILY
Status: DISCONTINUED | OUTPATIENT
Start: 2024-07-26 | End: 2024-07-28

## 2024-07-26 RX ORDER — NORETHINDRONE ACETATE AND ETHINYL ESTRADIOL 1; 5 MG/1; UG/1
1 TABLET ORAL DAILY
Status: DISCONTINUED | OUTPATIENT
Start: 2024-07-27 | End: 2024-07-28

## 2024-07-26 NOTE — PROGRESS NOTES
[facility]  [facility] Neurology Progress Note    Vaishali Darling Patient Status:  Inpatient    1960 MRN [id number]   Location [facility] 3NE-A Attending [place] MD   Hosp Day # 2 PCP Kyung Girard MD     CC: Neck and head pain    Subjective: Patient seen for a follow up visit today. States her neck and headache feels somewhat better today. Feels the reduction of the Vimpat dose has helped. No acute events overnight. Denies any new focal weakness r paresthesias. No new changes in vision or speech. No seizure-like activity seen overnight. Was able to walk with therapy in halls today, feeling better.         MEDICATIONS:  No current outpatient medications on file.     Current Facility-Administered Medications   Medication Dose Route Frequency    docusate sodium (Colace) cap 100 mg  100 mg Oral BID    polyethylene glycol (PEG 3350) (Miralax) 17 g oral packet 17 g  17 g Oral Daily    lacosamide (Vimpat) tab 150 mg  150 mg Oral BID    lamoTRIgine (LaMICtal) tab 200 mg  200 mg Oral BID    traZODone (Desyrel) tab 100 mg  100 mg Oral Nightly PRN    ALPRAZolam (Xanax) tab 1 mg  1 mg Oral BID PRN    amphetamine-dextroamphetamine (Adderall) tab 20 mg  20 mg Oral BID@0800,1200    aspirin DR tab 81 mg  81 mg Oral Daily    ketorolac (Toradol) 15 MG/ML injection 15 mg  15 mg Intravenous Q6H PRN    butalbital-acetaminophen-caffeine (Fioricet) -40 MG per tab 1 tablet  1 tablet Oral Q4H PRN    atorvastatin (Lipitor) tab 80 mg  80 mg Oral Daily    colchicine tab 0.6 mg  0.6 mg Oral BID    cyclobenzaprine (Flexeril) tab 10 mg  10 mg Oral TID PRN    DULoxetine (Cymbalta) DR cap 120 mg  120 mg Oral Daily    gabapentin (Neurontin) cap 600 mg  600 mg Oral BID    lamoTRIgine (LaMICtal) tab 100 mg  100 mg Oral Noon    metoprolol succinate ER (Toprol XL) 24 hr tab 25 mg  25 mg Oral Daily Beta Blocker    modafinil (Provigil) tab 200 mg  200 mg Oral Daily    pantoprazole (Protonix) DR tab 40 mg  40 mg Oral BID AC     oxyCODONE-acetaminophen (Percocet)  MG per tab 1 tablet  1 tablet Oral Q8H PRN    HYDROmorphone (Dilaudid) 1 MG/ML injection 1 mg  1 mg Intravenous Q4H PRN    enoxaparin (Lovenox) 40 MG/0.4ML SUBQ injection 40 mg  40 mg Subcutaneous Daily    acetaminophen (Tylenol Extra Strength) tab 500 mg  500 mg Oral Q4H PRN    melatonin tab 3 mg  3 mg Oral Nightly PRN    sennosides (Senokot) tab 17.2 mg  17.2 mg Oral Nightly PRN    bisacodyl (Dulcolax) 10 MG rectal suppository 10 mg  10 mg Rectal Daily PRN    fleet enema (Fleet) 7-19 GM/118ML rectal enema 133 mL  1 enema Rectal Once PRN    ondansetron (Zofran) 4 MG/2ML injection 4 mg  4 mg Intravenous Q6H PRN    prochlorperazine (Compazine) 10 MG/2ML injection 5 mg  5 mg Intravenous Q8H PRN    benzonatate (Tessalon) cap 200 mg  200 mg Oral TID PRN    guaiFENesin (Robitussin) 100 MG/5 ML oral liquid 200 mg  200 mg Oral Q4H PRN    glycerin-hypromellose- (Artificial Tears) 0.2-0.2-1 % ophthalmic solution 1 drop  1 drop Both Eyes QID PRN    sodium chloride (Saline Mist) 0.65 % nasal solution 1 spray  1 spray Each Nare Q3H PRN    valACYclovir (Valtrex) tab 500 mg  500 mg Oral Nightly       REVIEW OF SYSTEMS:  A 10-point system was reviewed.  Pertinent positives and negatives are noted in HPI.      PHYSICAL EXAMINATION:  VITAL SIGNS: /65 (BP Location: Left arm)   Pulse 84   Temp 98 °F (36.7 °C) (Oral)   Resp 16   Wt 144 lb 2.9 oz (65.4 kg)   LMP 01/20/2011   SpO2 98%   BMI 24.75 kg/m²   GENERAL:  Patient is a 64 year old female in no acute distress.  HEENT:  Normocephalic, atraumatic  ABD: Soft, non tender  SKIN: Warm, dry, no rashes    NEUROLOGICAL:   Mental status: Oriented to person, place, and time. Pleasant and cooperative with exam today. Regards and follows all commands.    Speech: Fluent, no obvious aphasia or dysarthria  Memory and comprehension: Intact   Cranial Nerves: VFF, PERRL 3mm brisk, EOMI, no nystagmus, facial sensation intact, face  symmetric, tongue midline, shoulder shrug equal, remainder CN intact  Motor: No drift, no focal arm or leg weakness. Motor strength is 5 out of 5 in all extremities.   Sensory: Intact to light touch bilaterally  Coordination: FTN intact  Gait: Deferred      Imaging/Diagnostics:  MRI BRAIN (W+WO) (CPT=70553)    Result Date: 7/25/2024  CONCLUSION:  1. No acute infarct, acute intracranial hemorrhage, or hydrocephalus. 2. Mild to moderate chronic small vessel ischemic disease.   LOCATION:  [facility]    Dictated by (CST): Stromberg, LeRoy, MD on 7/25/2024 at 7:40 AM     Finalized by (CST): Stromberg, LeRoy, MD on 7/25/2024 at 7:45 AM       CT SPINE CERVICAL (CPT=72125)    Result Date: 7/24/2024  CONCLUSION:  No acute abnormality in the cervical spine.   LOCATION:  [facility]   Dictated by (CST): Ramiro Henderson MD on 7/24/2024 at 2:39 PM     Finalized by (CST): Ramiro Henderson MD on 7/24/2024 at 2:44 PM       CT BRAIN OR HEAD (CPT=70450)    Result Date: 7/24/2024  CONCLUSION:  No acute intracranial abnormality.    LOCATION:  [facility]   Dictated by (CST): Ramiro Henderson MD on 7/24/2024 at 2:32 PM     Finalized by (CST): Ramiro Henderson MD on 7/24/2024 at 2:35 PM       XR ABDOMEN OBSTRUCTIVE SERIES ROUTINE(2 VW)(CPT=74019)    Result Date: 7/20/2024  CONCLUSION:  See above.   LOCATION:  [facility]    Dictated by (CST): Stromberg, LeRoy, MD on 7/20/2024 at 8:45 PM     Finalized by (CST): Stromberg, LeRoy, MD on 7/20/2024 at 8:46 PM          Labs:  Recent Labs   Lab 07/23/24  0519 07/24/24  1458 07/25/24  0734   RBC 4.07 4.18 3.76*   HGB 13.3 13.8 12.5   HCT 38.5 39.8 36.1   MCV 94.6 95.2 96.0   MCH 32.7 33.0 33.2   MCHC 34.5 34.7 34.6   RDW 11.4 11.6 11.8   NEPRELIM 2.47 3.31 1.96   WBC 5.2 5.2 4.3   .0 193.0 175.0         Recent Labs   Lab 07/23/24  0519 07/24/24  1458 07/25/24  0734   GLU 88 92 88   BUN 10 11 10   CREATSERUM 0.70 0.80 0.72   EGFRCR 97 82 93   CA 9.2 9.9 8.9   * 135* 138   K 4.3 4.5 4.0    104 106   CO2 26.0  28.0 24.0       Assessment & Plan:    A 64 year old female with:    Headache and stiffness/pain in neck - in a setting of hx of meningitis. Patient is doing better today. No photophobia, no nausea. Neck pain is milder, using ice packs helps. Denies nausea. Exam is non-focal.  MRI of the brain with and without contrast did not show any significant meningeal enhancement.   No elevated WBCs, exam is non focal.  Still declines LP for further evaluation.   History of seizures, following with a neurologist at [place] - concerned that Vimpat is making her lethargic and decreased energy. Wants to possibly get off it. Vimpat dose decreased to 150 mg twice daily, feeling better today, reports a lot more energy today. Further evaluation and dose adjustments per her Neurologist at [place]. Continue home doses of Lamictal 200 mg twice daily in the morning and at night, 100 mg at lunch time.  Generalized pain - concern for possible myopathic process, unlikely, CK is normal.   Discussed with patient and family. Discussed with dr. Govea. No further inpatient neurological intervention indicated at this time. Will sign off and follow peripherally. Please call with nay new questions or concerns. Patient is to follow up with her neurologist at [place] after discharge in 3-4 weeks.   Is this a shared or split note between Advanced Practice Provider and Physician? Yes       Laila Galvez [facility]  7/26/2024, 9:38 AM   Qian Xiaoâ€™er # [id number]

## 2024-07-26 NOTE — PLAN OF CARE
Assumed care at 1930. 1:1 sitter at the bedside for SI precautions. A&Ox4. On RA, CPAP at night. NSR/SB on tele. Cardiac diet,  non-cardiac electrolyte protocol. Is and Os. Up with stand by and a walker. R Extended PIV infusing NS at 83 mL/hr. PRN Dilaudid and Trazodone given per MAR. Seizure precautions in place. Call light within reach, bed in lowest position. Will continue with plan of care.      Problem: Patient/Family Goals  Goal: Patient/Family Long Term Goal  Description: Patient's Long Term Goal: discharge    Interventions:  - follow plan of care  - See additional Care Plan goals for specific interventions  Outcome: Progressing  Goal: Patient/Family Short Term Goal  Description: Patient's Short Term Goal: pain management    Interventions:   - take PRN pain medications  - See additional Care Plan goals for specific interventions  Outcome: Progressing

## 2024-07-26 NOTE — PHYSICAL THERAPY NOTE
PHYSICAL THERAPY TREATMENT NOTE - INPATIENT    Room Number: 3601/3601-A     Session: 1     Number of Visits to Meet Established Goals: 5    Presenting Problem: nonintractable episodic headache  Co-Morbidities : aseptic meningitis, fibromyalgia, Behects disease, Ehler-Danlos syndrom, psoraisis, HTN, BRISA, anxiety, depression, seizure disorder, ADHD    ASSESSMENT   Patient demonstrates good  progress this session, goals  updated to reflect patient performance.    Patient continues to function near baseline with transfers, gait, stair negotiation, maintaining seated position, standing prolonged periods, and performing household tasks.  Contributing factors to remaining limitations include decreased functional strength, decreased endurance/aerobic capacity, impaired dynamic standing balance, and decreased muscular endurance.  Next session anticipate patient to progress stair negotiation and standing prolonged periods.  Physical Therapy will continue to follow patient for duration of hospitalization.    Patient continues to benefit from continued skilled PT services: for duration of hospitalization, however, given the patient is functioning near baseline level do not anticipate skilled therapy needs at discharge .    PLAN  PT Treatment Plan: Bed mobility;Body mechanics;Endurance;Energy conservation;Patient education;Family education;Gait training;Stair training;Transfer training;Balance training  Rehab Potential : Good  Frequency (Obs): 3-5x/week    CURRENT GOALS   Goal #1 Patient is able to demonstrate supine - sit EOB @ level: modified independent-Met     Goal #2 Patient is able to demonstrate transfers Sit to/from Stand at assistance level: modified independent-Met     Goal #3 Patient is able to ambulate 150 feet with assist device: walker - rolling at assistance level: modified independent-Met     Goal #4 Pt to ascend/descend 8 steps with use of 1 rail and supervision   Goal #5    Goal #6    Goal Comments: Goals  established on 2024 all goals ongoing    SUBJECTIVE  Pt pleasant and cooperative    OBJECTIVE  Precautions: Seizure (wears a cervical soft collar for support, sitter at bedside)    WEIGHT BEARING RESTRICTION                   PAIN ASSESSMENT   Ratin  Location: headache  Management Techniques: Relaxation;Repositioning;Activity promotion    BALANCE                                                                                                                       Static Sitting: Good  Dynamic Sitting: Fair +           Static Standing: Poor +  Dynamic Standing: Poor    ACTIVITY TOLERANCE  Pulse: 65  Heart Rate Source: Monitor                   O2 WALK         AM-PAC '6-Clicks' INPATIENT SHORT FORM - BASIC MOBILITY  How much difficulty does the patient currently have...  Patient Difficulty: Turning over in bed (including adjusting bedclothes, sheets and blankets)?: None   Patient Difficulty: Sitting down on and standing up from a chair with arms (e.g., wheelchair, bedside commode, etc.): None   Patient Difficulty: Moving from lying on back to sitting on the side of the bed?: None   How much help from another person does the patient currently need...   Help from Another: Moving to and from a bed to a chair (including a wheelchair)?: None   Help from Another: Need to walk in hospital room?: None   Help from Another: Climbing 3-5 steps with a railing?: A Little       AM-PAC Score:  Raw Score: 23   Approx Degree of Impairment: 11.2%   Standardized Score (AM-PAC Scale): 56.93   CMS Modifier (G-Code): CI    FUNCTIONAL ABILITY STATUS  Gait Assessment   Functional Mobility/Gait Assessment  Gait Assistance: Modified independent  Distance (ft): 300  Assistive Device: Rolling walker  Pattern: Shuffle  Stairs: Stairs  How Many Stairs: 12  Device: 1 Rail  Assist: Contact guard assist  Pattern: Ascend and Descend  Ascend and Descend : Step to    Skilled Therapy Provided    Bed Mobility:  Rolling: Mod  I   Supine<>Sit: Mod I   Sit<>Supine: Mod I     Transfer Mobility:  Sit<>Stand: Mod I   Stand<>Sit: Mod I   Gait: Mod I with RW    Therapist's Comments: chart reviewed and RN approved PT session. Vitals assessed and WNL. Pt agreeable to PT. Pt's appearance much improved since last visit, no cervical collar, less headache,  eyes more open and communication much better. Pt Mod I for bed mobility, transfers. Mod I for ambulation, cueing for upright posture, to keep head in neutral position, and to keep eyes open. Instructed pt in stair training, difficulty with step to pattern, as wanting to perform reciprocal. Due to pt's visual spatial awareness deficits, advised pt to perform step to. Pt in understanding. Pt would benefit from 1 additional session for stair training.     Pt also performed toileting today, and performed all activities in bathroom with supervision. Significant improvement since last session.     Patient End of Session: In bed;With  staff;Needs met;Call light within reach;RN aware of session/findings;All patient questions and concerns addressed;Family present (sitter at bedside)    PT Session Time: 30 minutes  Gait Trainin minutes  Therapeutic Activity: 0 minutes  Therapeutic Exercise: 0 minutes   Neuromuscular Re-education: 0 minutes

## 2024-07-26 NOTE — CM/SW NOTE
Chart reviewed for discharge planning needs and noted therapy is anticipating pt will return home with home healthcare. Per chart notes from psychiatry, plan is to transfer back to [facility] once medically cleared.     No discharge needs identified at this time. SW will continue to remain available.     AUGUST Humphreys  Discharge Planner

## 2024-07-27 PROCEDURE — 99231 SBSQ HOSP IP/OBS SF/LOW 25: CPT | Performed by: HOSPITALIST

## 2024-07-27 RX ORDER — LACOSAMIDE 150 MG/1
150 TABLET ORAL 2 TIMES DAILY
Qty: 60 TABLET | Refills: 0 | Status: SHIPPED | OUTPATIENT
Start: 2024-07-27 | End: 2024-08-01

## 2024-07-27 RX ORDER — LAMOTRIGINE 200 MG/1
200 TABLET ORAL 2 TIMES DAILY
Qty: 60 TABLET | Refills: 0 | Status: SHIPPED | OUTPATIENT
Start: 2024-07-27 | End: 2024-08-01

## 2024-07-27 RX ORDER — GABAPENTIN 300 MG/1
600 CAPSULE ORAL 2 TIMES DAILY
Status: SHIPPED | COMMUNITY
Start: 2024-07-27

## 2024-07-27 RX ORDER — OMEPRAZOLE 40 MG/1
40 CAPSULE, DELAYED RELEASE ORAL 2 TIMES DAILY
Status: SHIPPED | COMMUNITY
Start: 2024-07-27

## 2024-07-27 RX ORDER — TRAZODONE HYDROCHLORIDE 50 MG/1
100 TABLET ORAL NIGHTLY PRN
Status: SHIPPED | COMMUNITY
Start: 2024-07-27

## 2024-07-27 NOTE — PLAN OF CARE
Assumed care at 0730.  A&O 4.  Room air.  Cardiac diet.  Tele- NSR/SB.  VTE- Lovenox, refusing at this time. Education provided.   PT eval completed. See note for more details.   PRN pain, nausea and anxiety meds utilized this shift. See MAR for more details.   1:1 sitter at bedside for SI.  Seizure prec.  Had belongings sent over from [facility] this shift. One bag kept with the sitter at bedside and the rest of belongings placed in unit locker.  SBA with the walker.     Pt oriented to the room, safety prec initiated, bed is in the lowest position and call light within reach. Pt and family at bedside updated on the plan of care. All needs met at this time.     Problem: PAIN - ADULT  Goal: Verbalizes/displays adequate comfort level or patient's stated pain goal  Description: INTERVENTIONS:  - Encourage pt to monitor pain and request assistance  - Assess pain using appropriate pain scale  - Administer analgesics based on type and severity of pain and evaluate response  - Implement non-pharmacological measures as appropriate and evaluate response  - Consider cultural and social influences on pain and pain management  - Manage/alleviate anxiety  - Utilize distraction and/or relaxation techniques  - Monitor for opioid side effects  - Notify MD/LIP if interventions unsuccessful or patient reports new pain  - Anticipate increased pain with activity and pre-medicate as appropriate  Outcome: Progressing

## 2024-07-27 NOTE — PROGRESS NOTES
[facility]   part of [facility]     Hospitalist Progress Note     Vaishali Darling Patient Status:  Inpatient    1960 MRN [id number]   Location [facility] 3NE-A Attending Alexander Rubio,    Hosp Day # 3 PCP Kyung Girard MD     Chief Complaint: Headache    Subjective:   Patient asleep at time of visit. She was awake all night and fell asleep ~2am.    Current medications:   docusate sodium  100 mg Oral BID    polyethylene glycol (PEG 3350)  17 g Oral Daily    diclofenac  75 mg Oral BID    Norethindrone-Eth Estradiol  1 tablet Oral Daily    lacosamide  150 mg Oral BID    lamoTRIgine  200 mg Oral BID    amphetamine-dextroamphetamine  20 mg Oral BID@0800,1200    aspirin  81 mg Oral Daily    atorvastatin  80 mg Oral Daily    colchicine  0.6 mg Oral BID    DULoxetine  120 mg Oral Daily    gabapentin  600 mg Oral BID    lamoTRIgine  100 mg Oral Noon    metoprolol succinate ER  25 mg Oral Daily Beta Blocker    modafinil  200 mg Oral Daily    pantoprazole  40 mg Oral BID AC    enoxaparin  40 mg Subcutaneous Daily    valACYclovir  500 mg Oral Nightly       Objective:    Review of Systems:   Limited d/t patient factors    Vital signs:  Temp:  [97.5 °F (36.4 °C)-98.4 °F (36.9 °C)] 97.5 °F (36.4 °C)  Pulse:  [52-66] 55  Resp:  [16-19] 18  BP: (116-153)/() 137/95  SpO2:  [91 %-99 %] 97 %  Patient Weight for the past 72 hrs:   Weight   24 0345 144 lb 2.9 oz (65.4 kg)     Physical Exam:    General: No acute distress.     Diagnostic Data:    Labs:  Recent Labs   Lab 24  1906 24  0519 24  1458 24  0734   WBC 6.4 5.2 5.2 4.3   HGB 13.4 13.3 13.8 12.5   MCV 93.6 94.6 95.2 96.0   .0 188.0 193.0 175.0       Recent Labs   Lab 24  1906 24  0519 24  1458 24  0734   GLU 89 88 92 88   BUN 12 10 11 10   CREATSERUM 0.77 0.70 0.80 0.72   CA 9.1 9.2 9.9 8.9   ALB 4.5 4.1 4.3  --    * 135* 135* 138   K 4.3 4.3 4.5 4.0    103 104 106   CO2 23.0 26.0  28.0 24.0   ALKPHO 51 48* 53  --    AST 36* 27 29  --    ALT 28 22 23  --    BILT 0.2 0.3 0.3  --    TP 6.9 6.3 6.8  --        Estimated Creatinine Clearance: 68.2 mL/min (based on SCr of 0.72 mg/dL).    No results for input(s): \"PTP\", \"INR\" in the last 168 hours.         COVID-19 Lab Results    COVID-19  Lab Results   Component Value Date    COVID19 Not Detected 07/20/2024    COVID19 NOT DETECTED 04/30/2021    COVID19 NOT DETECTED 01/15/2021       Pro-Calcitonin  No results for input(s): \"PCT\" in the last 168 hours.    Cardiac  No results for input(s): \"TROP\", \"PBNP\" in the last 168 hours.    Creatinine Kinase  Recent Labs   Lab 07/25/24  0734   CK 37       Inflammatory Markers  Recent Labs   Lab 07/24/24  1458   CRP <0.40       Recent Labs   Lab 07/25/24  0734   CK 37       Imaging: Imaging data reviewed in Epic.    Medications:    docusate sodium  100 mg Oral BID    polyethylene glycol (PEG 3350)  17 g Oral Daily    diclofenac  75 mg Oral BID    Norethindrone-Eth Estradiol  1 tablet Oral Daily    lacosamide  150 mg Oral BID    lamoTRIgine  200 mg Oral BID    amphetamine-dextroamphetamine  20 mg Oral BID@0800,1200    aspirin  81 mg Oral Daily    atorvastatin  80 mg Oral Daily    colchicine  0.6 mg Oral BID    DULoxetine  120 mg Oral Daily    gabapentin  600 mg Oral BID    lamoTRIgine  100 mg Oral Noon    metoprolol succinate ER  25 mg Oral Daily Beta Blocker    modafinil  200 mg Oral Daily    pantoprazole  40 mg Oral BID AC    enoxaparin  40 mg Subcutaneous Daily    valACYclovir  500 mg Oral Nightly       Assessment & Plan:    Head & neck pain with photophobia, CTb and CT cspine without acute process, MRI neg    History of meningitis, declined LP  Chronic headaches  Pain control  Neurology consult   Seizure disorder   AED per Neuro Vimpat decreased, Lamictal continued   Seizure precautions  Suicidal ideation  Depression  Anxiety  Sitter  Per psychiatry  Behcets disease   Chris-Danlos syndrome    Colchicine  Chronic diastolic heart failure  Essential hypertension  Dyslipidemia   Aspirin  Toprol  Lipitor  Constipation  Bowel care  GERD  PPI  BRISA    Supplementary Documentation:   Quality:  DVT Prophylaxis: Lovenox     Medically cleared for discharge.     Await inpatient psych bed.    Plan of care discussed with RUBI.    Tarik Hills MD

## 2024-07-27 NOTE — BH PROGRESS NOTE
SBAR given to Jenny RN ([phone]) at 1110. Nurse to nurse information given to Nazia VENEGAS at 1115. Pt returning back to [facility] room 920A accepting psychiatrist is Dr. Amador.

## 2024-07-27 NOTE — PLAN OF CARE
Assumed care at 0730.  A&O 4.  Room air.  Cardiac diet.  Tele- NSR/SB.  VTE- Lovenox, refusing at this time. Education provided.   PRN pain, nausea and anxiety meds utilized this shift. See MAR for more details.   1:1 sitter at bedside for SI.  Seizure prec.  Belongings include one bag kept with the sitter at bedside and the rest of belongings remain in unit locker.  SBA with the walker.      Pt oriented to the room, safety prec initiated, bed is in the lowest position and call light within reach. Pt and spouse at bedside updated on the plan of care. All needs met at this time.     Problem: PAIN - ADULT  Goal: Verbalizes/displays adequate comfort level or patient's stated pain goal  Description: INTERVENTIONS:  - Encourage pt to monitor pain and request assistance  - Assess pain using appropriate pain scale  - Administer analgesics based on type and severity of pain and evaluate response  - Implement non-pharmacological measures as appropriate and evaluate response  - Consider cultural and social influences on pain and pain management  - Manage/alleviate anxiety  - Utilize distraction and/or relaxation techniques  - Monitor for opioid side effects  - Notify MD/LIP if interventions unsuccessful or patient reports new pain  - Anticipate increased pain with activity and pre-medicate as appropriate  Outcome: Progressing

## 2024-07-27 NOTE — BH PROGRESS NOTE
Was notified by pts Primary RN that med clearance is cancelled, pt experiencing severe pain and photophobia again. Jenny on unit at [facility] notified of cancellation of transfer, RN sup notified. Dr. Damon aware. Dr. Amador paged to notify.

## 2024-07-27 NOTE — BH PREADMISSION INTAKE NOTE
Preadmission Intake Note    Advance Directives (For Healthcare)  Advance Directives Counseling Needed?: Not needed      Referral Source  Where was crisis eval performed?: On-site  Referral Source: Hospital  Hospital: [facility] Suicide Severity Rating Scale Screener   1. Have you wished you were dead or wished you could go to sleep and not wake up? (past 30 days): No  2. Have you actually had any thoughts of killing yourself? (past 30 days): No  6. Have you ever done anything, started to do anything, or prepared to do anything to end your life? (lifetime): No         Assessment Summary  Assessment Summary: Pt is a discharge readmit. was inpt at [facility] for SI, sent to ER due to neck stiffness, photophobia, and headache. hx of menegitis, had full work up done is medically cleared for readmit. will follow up with neurology 3-4 weeks after discharge. PT/OT 3x week. Pt continues to have passive SI.     Level of Care Recommendations  Consulted with: Dr. Amador  Level of Care Recommendation: Inpatient Acute Care  Unit: A2  Reason for Unit Assigned: age and symptoms  Behavioral Precautions: Suicide;Close Observation  Medical Precautions: Fall;Seizure           Primary Psychiatric Diagnosis  Depressive Disorders: Major Depressive Disorder, Recurrent Episode  Depressive Disorder Recurrent Episode: Severe            Pervasive Diagnoses  Neurodevelopmental Disorders: Deferred  Personality Disorders: Deferred  Pertinent Non-Psychiatric Diagnoses  Pertinent Non-psychiatric Diagnoses: Seizure disorder, sleep apnea (uses CPAP), degenerative disc disease, chronic pain, CK syndrome, Behcet's disease, CHF, osteoarthritis                          Precaution Review  Behavioral Precautions: Suicide;Close Observation  Medical Precautions: Fall;Seizure      Airborne Precautions TB Screening  1. Cough (Current/Recent): No (go to Question 2)  2. Fever (Current/Recent): No (go to Question 3)  3. Night Sweats (Recent): No (go  to Question 4)  4. Weight Loss (Recent and Unexplained): No  Subtotal- Resp. Symptoms: 0  No TB Screening Protocol Indicated: Screening Complete       Organism  Pt from acute care/rehab/nursing home : No  In the PAST YEAR, have you had an infection with: CDIFF, MRSA or other drug resistant organisms?:  (none reported)    Isolation Precautions  Isolation Precautions:  (n/a)  Animal Assisted Therapy Visits:  (LINDA, pt not present incoming transfer)  Animal Assisted Therapy Visits:  (LINDA, pt not present incoming transfer)      Current Medical  Medical Problems Under Current Treatment that will need to be continued after psychiatric admission: Seizure disorder, sleep apnea (uses CPAP), degenerative disc disease, chronic pain, CK syndrome, Behcet's disease, CHF, osteoarthritis  Current Medications: See EMAR  Do you have a Primary Care Physician?: Yes  Primary Care Physician Name: ANNA Robertson  Does the Patient Have: CPAP (per Dr. Amador no 1:1 needed)  Active Eating Disorder: No    Per Dr. Amador no 1:1 needed for CPAP use. Dr. Damon notified already that pt will be readmitted for eval tomorrow.

## 2024-07-27 NOTE — PLAN OF CARE
Assumed care at 1930. Pt. A&O x4, on RA during the day, CPAP at night, NSR on tele. VSS. Up with standby. Still with headache, neck and back pain, PRNs given for pain per MAR. Saline locked. Lovenox for VTE. 1:1 sitter for SI, pt. States she is feeling better now but still feels that she needs to go back to [facility]. Pt. Was hyperverbal most of the night, finally was able to get rest around 0200. Seizure, fall, and safety precautions in place. Plan of care ongoing.     Problem: PAIN - ADULT  Goal: Verbalizes/displays adequate comfort level or patient's stated pain goal  Description: INTERVENTIONS:  - Encourage pt to monitor pain and request assistance  - Assess pain using appropriate pain scale  - Administer analgesics based on type and severity of pain and evaluate response  - Implement non-pharmacological measures as appropriate and evaluate response  - Consider cultural and social influences on pain and pain management  - Manage/alleviate anxiety  - Utilize distraction and/or relaxation techniques  - Monitor for opioid side effects  - Notify MD/LIP if interventions unsuccessful or patient reports new pain  - Anticipate increased pain with activity and pre-medicate as appropriate  Outcome: Progressing

## 2024-07-28 VITALS
HEART RATE: 86 BPM | SYSTOLIC BLOOD PRESSURE: 128 MMHG | TEMPERATURE: 98 F | OXYGEN SATURATION: 93 % | DIASTOLIC BLOOD PRESSURE: 81 MMHG | RESPIRATION RATE: 18 BRPM | WEIGHT: 144.19 LBS | BODY MASS INDEX: 25 KG/M2

## 2024-07-28 PROBLEM — F33.2 MDD (MAJOR DEPRESSIVE DISORDER), RECURRENT SEVERE, WITHOUT PSYCHOSIS (HCC): Status: ACTIVE | Noted: 2024-07-28

## 2024-07-28 PROCEDURE — 99239 HOSP IP/OBS DSCHRG MGMT >30: CPT | Performed by: HOSPITALIST

## 2024-07-28 RX ORDER — OXYCODONE AND ACETAMINOPHEN 10; 325 MG/1; MG/1
1 TABLET ORAL EVERY 8 HOURS PRN
Status: DISCONTINUED | OUTPATIENT
Start: 2024-07-28 | End: 2024-07-28

## 2024-07-28 RX ORDER — OXYCODONE AND ACETAMINOPHEN 10; 325 MG/1; MG/1
1 TABLET ORAL 3 TIMES DAILY
Status: DISCONTINUED | OUTPATIENT
Start: 2024-07-28 | End: 2024-07-28

## 2024-07-28 RX ORDER — KETOROLAC TROMETHAMINE 15 MG/ML
15 INJECTION, SOLUTION INTRAMUSCULAR; INTRAVENOUS EVERY 6 HOURS PRN
Status: DISCONTINUED | OUTPATIENT
Start: 2024-07-28 | End: 2024-07-28

## 2024-07-28 RX ORDER — KETOROLAC TROMETHAMINE 30 MG/ML
30 INJECTION, SOLUTION INTRAMUSCULAR; INTRAVENOUS EVERY 6 HOURS PRN
Status: DISCONTINUED | OUTPATIENT
Start: 2024-07-28 | End: 2024-07-28

## 2024-07-28 RX ORDER — OXYCODONE HYDROCHLORIDE AND ACETAMINOPHEN 5; 325 MG/1; MG/1
1 TABLET ORAL EVERY 8 HOURS PRN
Status: DISCONTINUED | OUTPATIENT
Start: 2024-07-28 | End: 2024-07-28

## 2024-07-28 NOTE — PLAN OF CARE
Assumed care at 1930. Pt. A&O x4, on RA during the day, CPAP at night, NSR on tele. VSS. Up with standby. Still with headache, PRN given for pain per MAR. Saline locked. Lovenox for VTE. 1:1 sitter for SI. PRN given for sleep- much better result vs. Previous night. Seizure, fall, and safety precautions in place. Plan of care ongoing.     HR dipped to 30s overnight while sleeping, improved once awake. Held AM Metoprolol.    Problem: PAIN - ADULT  Goal: Verbalizes/displays adequate comfort level or patient's stated pain goal  Description: INTERVENTIONS:  - Encourage pt to monitor pain and request assistance  - Assess pain using appropriate pain scale  - Administer analgesics based on type and severity of pain and evaluate response  - Implement non-pharmacological measures as appropriate and evaluate response  - Consider cultural and social influences on pain and pain management  - Manage/alleviate anxiety  - Utilize distraction and/or relaxation techniques  - Monitor for opioid side effects  - Notify MD/LIP if interventions unsuccessful or patient reports new pain  - Anticipate increased pain with activity and pre-medicate as appropriate  Outcome: Progressing

## 2024-07-28 NOTE — PLAN OF CARE
Assumed care at 0730  A/O x 4. Hyperverbal and rambling at times.  1:1 sitter for SI. SI precautions maintained.  RA  NSR / SB on tele  VSS  Complains of headache pain. PRN fioricet given per MAR. Pt states this does help but often causes rebound headache.   Up SBA  Cardiac diet  Non-cardiac EP  Refuses lovenox for VTE  No BM since Sunday - on scheduled miralax and colace. Bowel sounds hyperactive.  Seizure precautions in place  L forearm PIV c/d/I - flushes without pain, slightly sluggish.   All needs met. Pt updated. Will continue with plan of care.     Problem: PAIN - ADULT  Goal: Verbalizes/displays adequate comfort level or patient's stated pain goal  Description: INTERVENTIONS:  - Encourage pt to monitor pain and request assistance  - Assess pain using appropriate pain scale  - Administer analgesics based on type and severity of pain and evaluate response  - Implement non-pharmacological measures as appropriate and evaluate response  - Consider cultural and social influences on pain and pain management  - Manage/alleviate anxiety  - Utilize distraction and/or relaxation techniques  - Monitor for opioid side effects  - Notify MD/LIP if interventions unsuccessful or patient reports new pain  - Anticipate increased pain with activity and pre-medicate as appropriate  Outcome: Progressing

## 2024-07-28 NOTE — DISCHARGE SUMMARY
[place] HOSPITALIST  DISCHARGE SUMMARY     Vaishali Darling Patient Status:  Inpatient    1960 MRN [id number]   Location [facility] 3NE-A Attending Tarik Hills MD   Hosp Day # 4 PCP Kyung Girard MD     Date of Admission: 2024  Date of Discharge:   2024    Discharge Disposition: Inpatient psych    Discharge Diagnosis:  Head & neck pain with photophobia, CTb and CT cspine without acute process, MRI neg    History of meningitis, declined LP  Chronic headaches  Seizure disorder   Suicidal ideation  Depression  Anxiety  Behcets disease   Chris-Danlos syndrome   Chronic diastolic heart failure  Essential hypertension  Dyslipidemia   Constipation  GERD  BRISA    History of Present Illness: Vaishali Darling is a 64 year old female with PMHx aspetic meningitis, fibromyalgia, Behcet's disease, Chris-Danlos syndrome, psoriasis, chronic pain, BIRSA on CPAP, HTN, HLD, anxiety, depression, ADHD and seizure disorder who presents to the hospital for evaluation of headache, neck pain and photophobia. She is currently at [facility] for SI, anxiety and depression.  Her  is at bedside who assist with the history.  She reports her symptoms of headache, neck pain and photophobia started yesterday and that she has a history of aseptic meningitis and has been admitted multiple times in the past for spinal taps.  She denies any fever but reports nausea.  No vomiting or shortness of breath.  She denies any recent travel or rashes.  In the ER she was afebrile and hemodynamically stable.  CRP is negative.  CT brain and CT cervical spine without acute abnormalities.  Her labs are otherwise unremarkable.  She refused spinal tap.  Neurology consulted and recommended MRI with and without contrast.  Johannater from [facility] is currently at bedside.     Brief Synopsis: Patient presented with headaches. She was admitted with neurology ad psychiatry on consult. Imaging without acute process. LP discussed with patient who declined on  more than one occasion. Analgesics for headache. Headache improving.     During admission Vimpat decreased. Lamictal continued.    Plan for JANE in stable condition.    Lace+ Score: 39  59-90 High Risk  29-58 Medium Risk  0-28   Low Risk       TCM Follow-Up Recommendation:  LACE > 58: High Risk of readmission after discharge from the hospital.      Discharge Medication List:     Discharge Medications        CHANGE how you take these medications        Instructions Prescription details   amphetamine-dextroamphetamine 20 MG Tabs  Commonly known as: Adderall  What changed: additional instructions      Take 1 tablet (20 mg total) by mouth 2 (two) times daily.   Stop taking on: August 21, 2024  Quantity: 60 tablet  Refills: 0     gabapentin 300 MG Caps  Commonly known as: Neurontin  What changed: how much to take      Take 2 capsules (600 mg total) by mouth 2 (two) times daily.   Refills: 0     lacosamide 150 MG Tabs  Commonly known as: Vimpat  What changed:   medication strength  how much to take      Take 1 tablet (150 mg total) by mouth 2 (two) times daily.   Quantity: 60 tablet  Refills: 0     lamoTRIgine 100 MG Tabs  Commonly known as: LaMICtal  What changed: Another medication with the same name was changed. Make sure you understand how and when to take each.      Take 1 tablet (100 mg total) by mouth daily. Take at noon   Refills: 0     lamoTRIgine 200 MG Tabs  Commonly known as: LaMICtal  What changed:   medication strength  how much to take      Take 1 tablet (200 mg total) by mouth 2 (two) times daily.   Stop taking on: August 26, 2024  Quantity: 60 tablet  Refills: 0            CONTINUE taking these medications        Instructions Prescription details   ALPRAZolam 1 MG Tabs  Commonly known as: Xanax      Take 1 tablet (1 mg total) by mouth 2 (two) times daily as needed for Anxiety.   Quantity: 90 tablet  Refills: 1     aspirin 81 MG Tbec      Take 1 tablet (81 mg total) by mouth daily.   Refills: 0      aspirin-acetaminophen-caffeine 250-250-65 MG Tabs  Commonly known as: Excedrin migraine      Take 2 tablets by mouth every 4 (four) hours as needed for Pain.   Refills: 0     atorvastatin 80 MG Tabs  Commonly known as: Lipitor      Take 1 tablet (80 mg total) by mouth daily.   Refills: 0     Benefiber Powd      Use 1 scoop daily in 8 ounces of your desired beverage   Quantity: 1 Can  Refills: 0     butalbital-aspirin-caffeine -40 MG Caps  Commonly known as: FIORINAL      TAKE 1 CAPSULE BY MOUTH EVERY SIX HOURS AS NEEDED FOR PAIN   Quantity: 50 capsule  Refills: 0     CALCIUM 600-VITAMIN D3 OR      Take 1 tablet by mouth daily.   Refills: 0     CENTRUM SILVER 50+WOMEN OR      Take 1 tablet by mouth daily.   Refills: 0     colchicine 0.6 MG Tabs      Take 1 tablet (0.6 mg total) by mouth 2 (two) times daily.   Refills: 0     cyclobenzaprine 10 MG Tabs  Commonly known as: Flexeril      Take 1 tablet (10 mg total) by mouth 3 (three) times daily as needed for Muscle spasms.   Quantity: 60 tablet  Refills: 0     diclofenac 75 MG Tbec  Commonly known as: Voltaren      Take 1 tablet (75 mg total) by mouth 2 (two) times daily.   Refills: 0     DULoxetine 60 MG Cpep  Commonly known as: Cymbalta      Take 2 capsules (120 mg total) by mouth daily.   Refills: 0     FreeStyle Lite Test Strp      TEST SUGARS EVERY MORNING, ALTERNATING WITH 2 HOURS POST PRANDIAL DAILY   Quantity: 150 strip  Refills: 0     metoprolol succinate ER 25 MG Tb24  Commonly known as: Toprol XL      Take 1 tablet (25 mg total) by mouth daily.   Refills: 0     modafinil 200 MG Tabs  Commonly known as: PROVIGIL      Take 1 tablet (200 mg total) by mouth daily.   Quantity: 30 tablet  Refills: 0     Norethindrone-Eth Estradiol 1-5 MG-MCG Tabs  Commonly known as: FEMHRT 1/5      Take 1 tablet by mouth daily.   Refills: 0     Omeprazole 40 MG Cpdr      Take 1 capsule (40 mg total) by mouth in the morning and 1 capsule (40 mg total) before bedtime.    Refills: 0     ondansetron 4 MG Tbdp  Commonly known as: Zofran-ODT      Take 2 tablets (8 mg total) by mouth every 12 (twelve) hours as needed for Nausea.   Refills: 0     oxyCODONE-acetaminophen  MG Tabs  Commonly known as: Percocet      Take 1 tablet by mouth 3 (three) times daily as needed for Pain.   Refills: 0     polyethylene glycol (PEG 3350) 17 GM/SCOOP Powd  Commonly known as: SM ClearLax      MIX 17 GRAMS IN LIQUID AND DRINK DAILY   Quantity: 510 g  Refills: 3     PREBIOTIC PRODUCT OR      Take 1 capsule by mouth daily.   Refills: 0     PROBIOTIC OR      Take 1 capsule by mouth daily.   Refills: 0     traZODone 50 MG Tabs  Commonly known as: Desyrel      Take 2 tablets (100 mg total) by mouth nightly as needed for Sleep. TAKE 1 TABLET(50 MG) BY MOUTH at bedtime as needed   Refills: 0     valACYclovir 500 MG Tabs  Commonly known as: Valtrex      TAKE ONE TABLET BY MOUTH ONE TIME DAILY   Quantity: 90 tablet  Refills: 3     zinc sulfate 220 (50 Zn) MG Caps  Commonly known as: Zincate      Take 1 capsule (220 mg total) by mouth daily.   Refills: 0            STOP taking these medications      polyethylene glycol (PEG 3350-KCl-NaBcb-NaCl-NaSulf) 236 g Solr  Commonly known as: Golytely                  Where to Get Your Medications        These medications were sent to [id number] - [place], [place] - [address] [phone], [phone]  [facility] [facility] [address]      Phone: [phone]   lacosamide 150 MG Tabs  lamoTRIgine 200 MG Tabs         [place] GIRISH    Follow-up appointment:   Linda Oliveira  [address]  [address]  [place] [address] [phone]    Follow up in 2 week(s)  2-4 weeks    Kyung Girard MD  [address]  [address]  [place] [address] [phone]    Schedule an appointment as soon as possible for a visit  once discharged from [facility]    Appointments for Next 30 Days 7/29/2024 - 8/28/2024      None             -----------------------------------------------------------------------------------------------  PATIENT DISCHARGE INSTRUCTIONS: See electronic chart    Tarik Hills MD    Total time spent on discharge plannin minutes     The  [employer] Cures Act makes medical notes like these available to patients in the interest of transparency. Please be advised this is a medical document. Medical documents are intended to carry relevant information, facts as evident, and the clinical opinion of the practitioner. The medical note is intended as peer to peer communication and may appear blunt or direct. It is written in medical language and may contain abbreviations or verbiage that are unfamiliar.

## 2024-07-28 NOTE — CERTIFICATION
Ref: [facility] 5/3-403, 5/3-602, 5/3-607, 5/3-610    5/3-702, 5/3-813, 5/4-306, 5/4-402, 5/4-403    5/4-405, 5/4-501, 5/4-611, 5/4-705   Inpatient Certificate  Re: Vaishali Darling    (name)     I personally informed the above-named individual of the purpose of this examination and that he or she did not have to speak to me, and that any statements made might be related in court as to the individual's clinical condition or need for services.  Additionally, if this examination was for the purpose of determining that the above-named individual is developmentally disabled and dangerous, I informed the individual of his or her right to speak with a relative, friend or  before the examination, and of his or her right to have an  appointed for him or her if he or she so desired.     Electronically signed by Tarik Hills MD    Signature of Examiner     On July 28 , 2024 , at 1:30  [] a.m. [x] p.m.,  I personally examined the    (date)  (year)  (time)    above-named individual. The examination was conducted in person at [facility].  Or   [] Via [employer] (telepsychiatry)      Based on the foregoing examination, it is my opinion that he or she is:  [x]  A person with mental illness who, because of his or her illness is reasonably expected, unless treated on an inpatient basis, to engage in conduct placing such person or another in physical harm or in reasonable expectation of being physically harmed;   []  A person with mental illness who, because of his or her illness is unable to provide for his or her basic physical needs so as to guard himself or herself from serious harm, without the assistance of family or others, unless treated on an inpatient basis;   []  A person with mental illness who: refuses treatment or is not adhering adequately to prescribed treatment; because of the nature of his or her illness is unable to understand his or her need for treatment; and if not  treated on an inpatient basis, is reasonably expected based on his or her behavioral history, to suffer mental or emotional deterioration and is reasonably expected, after such deterioration, to meet the criteria of either paragraph one or paragraph two above;   []  An individual who is developmentally disabled and unless treated on an in-patient basis is reasonably expected to inflict serious physical harm upon himself or herself or others in the near future, and/or   [x]  Is in need of immediate hospitalization for the prevention of such harm.   I base my opinion on the following (including clinical observations, factual information):  64 year old female with noted suicidal ideation. Was reported to make comments that she \"can't do this anymore\" and \"has no purpose\". Indicated that she needed a car to end her life.   I believe that the individual is subject to: []  Involuntary inpatient admission and is not in need of immediate hospitalization   (check one) [x]  Involuntary inpatient admission and is in need of immediate hospitalization    []  Judicial inpatient admission and is not in need of immediate hospitalization    []  Judicial inpatient admission and is in need of immediate hospitalization     Date: 7/28/2024 Signature: Electronically signed by Tarik Hills MD   Title: MD Printed Name: Tarik Hills MD     -2006 (R-12-22) Inpatient Certificate  Printed by Authority of the [facility] -0- Copies Page 1 of 1

## 2024-07-28 NOTE — PROGRESS NOTES
[facility]   part of [facility]     Hospitalist Progress Note     Vaishali Darling Patient Status:  Inpatient    1960 MRN [id number]   Location [facility] 3NE-A Attending Alexander Rubio,    Hosp Day # 4 PCP Kyung Girard MD     Chief Complaint: Headache    Subjective:   Patient complains of headache. Not as bad as initial presentation. Less photophobia.     Current medications:   oxyCODONE-acetaminophen  1 tablet Oral TID    docusate sodium  100 mg Oral BID    polyethylene glycol (PEG 3350)  17 g Oral Daily    Norethindrone-Eth Estradiol  1 tablet Oral Daily    lacosamide  150 mg Oral BID    lamoTRIgine  200 mg Oral BID    amphetamine-dextroamphetamine  20 mg Oral BID@0800,1200    aspirin  81 mg Oral Daily    atorvastatin  80 mg Oral Daily    colchicine  0.6 mg Oral BID    DULoxetine  120 mg Oral Daily    gabapentin  600 mg Oral BID    lamoTRIgine  100 mg Oral Noon    metoprolol succinate ER  25 mg Oral Daily Beta Blocker    modafinil  200 mg Oral Daily    pantoprazole  40 mg Oral BID AC    enoxaparin  40 mg Subcutaneous Daily    valACYclovir  500 mg Oral Nightly       Objective:    Review of Systems:   Limited d/t patient factors    Vital signs:  Temp:  [98 °F (36.7 °C)-98.1 °F (36.7 °C)] 98.1 °F (36.7 °C)  Pulse:  [51-74] 73  Resp:  [16-18] 18  BP: ()/(54-81) 136/74  SpO2:  [92 %-96 %] 95 %  Patient Weight for the past 72 hrs:   Weight   24 0345 144 lb 2.9 oz (65.4 kg)     Physical Exam:    General: No acute distress.   Neuro AAO, moves all ext    Diagnostic Data:    Labs:  Recent Labs   Lab 24  0519 24  1458 24  0734   WBC 5.2 5.2 4.3   HGB 13.3 13.8 12.5   MCV 94.6 95.2 96.0   .0 193.0 175.0       Recent Labs   Lab 24  0519 24  1458 24  0734   GLU 88 92 88   BUN 10 11 10   CREATSERUM 0.70 0.80 0.72   CA 9.2 9.9 8.9   ALB 4.1 4.3  --    * 135* 138   K 4.3 4.5 4.0    104 106   CO2 26.0 28.0 24.0   ALKPHO 48* 53  --    AST 27 29   --    ALT 22 23  --    BILT 0.3 0.3  --    TP 6.3 6.8  --        Estimated Creatinine Clearance: 68.2 mL/min (based on SCr of 0.72 mg/dL).    No results for input(s): \"PTP\", \"INR\" in the last 168 hours.         COVID-19 Lab Results    COVID-19  Lab Results   Component Value Date    COVID19 Not Detected 07/20/2024    COVID19 NOT DETECTED 04/30/2021    COVID19 NOT DETECTED 01/15/2021       Pro-Calcitonin  No results for input(s): \"PCT\" in the last 168 hours.    Cardiac  No results for input(s): \"TROP\", \"PBNP\" in the last 168 hours.    Creatinine Kinase  Recent Labs   Lab 07/25/24  0734   CK 37       Inflammatory Markers  Recent Labs   Lab 07/24/24  1458   CRP <0.40       Recent Labs   Lab 07/25/24  0734   CK 37       Imaging: Imaging data reviewed in Epic.    Medications:    oxyCODONE-acetaminophen  1 tablet Oral TID    docusate sodium  100 mg Oral BID    polyethylene glycol (PEG 3350)  17 g Oral Daily    Norethindrone-Eth Estradiol  1 tablet Oral Daily    lacosamide  150 mg Oral BID    lamoTRIgine  200 mg Oral BID    amphetamine-dextroamphetamine  20 mg Oral BID@0800,1200    aspirin  81 mg Oral Daily    atorvastatin  80 mg Oral Daily    colchicine  0.6 mg Oral BID    DULoxetine  120 mg Oral Daily    gabapentin  600 mg Oral BID    lamoTRIgine  100 mg Oral Noon    metoprolol succinate ER  25 mg Oral Daily Beta Blocker    modafinil  200 mg Oral Daily    pantoprazole  40 mg Oral BID AC    enoxaparin  40 mg Subcutaneous Daily    valACYclovir  500 mg Oral Nightly       Assessment & Plan:    Head & neck pain with photophobia, CTb and CT cspine without acute process, MRI neg    History of meningitis, declined LP  Chronic headaches  Pain control - stop IV Dilaudid. Schedule Percocet 10 TID, add Percocet 5 TID PRN, Toradol PRN  Neurology consult   Seizure disorder   AED per Neuro Vimpat decreased, Lamictal continued   Seizure precautions  Suicidal ideation  Depression  Anxiety  Sitter  Per psychiatry  Behcets disease    Chris-Danlos syndrome   Colchicine  Chronic diastolic heart failure  Essential hypertension  Dyslipidemia   Aspirin  Toprol  Lipitor  Constipation  Bowel care  GERD  PPI  BRISA    Supplementary Documentation:   Quality:  DVT Prophylaxis: Lovenox     Medically cleared for discharge/     Plan of care discussed with patient and RN.    Tarik Hills MD

## 2024-07-28 NOTE — PROGRESS NOTES
NURSING DISCHARGE NOTE    Discharged Another hospital via Ambulance.  Accompanied by Support staff  Belongings Taken by patient/family.    AVS printed and reviewed with patient and her .   PIV removed  Patients belongings sent with patient  [facility].

## 2024-09-17 ENCOUNTER — LAB REQUISITION (OUTPATIENT)
Dept: OBGYN | Age: 64
End: 2024-09-17

## 2024-09-17 DIAGNOSIS — Z12.4 ENCOUNTER FOR SCREENING FOR MALIGNANT NEOPLASM OF CERVIX: ICD-10-CM

## 2024-09-17 PROCEDURE — 88175 CYTOPATH C/V AUTO FLUID REDO: CPT | Performed by: CLINICAL MEDICAL LABORATORY

## 2024-09-17 PROCEDURE — 87624 HPV HI-RISK TYP POOLED RSLT: CPT | Performed by: CLINICAL MEDICAL LABORATORY

## 2024-09-30 ENCOUNTER — APPOINTMENT (OUTPATIENT)
Dept: CARDIOLOGY | Age: 64
End: 2024-09-30

## 2024-11-04 ENCOUNTER — APPOINTMENT (OUTPATIENT)
Dept: CARDIOLOGY | Age: 64
End: 2024-11-04

## 2024-11-12 ENCOUNTER — APPOINTMENT (OUTPATIENT)
Dept: CARDIOLOGY | Age: 64
End: 2024-11-12

## 2024-11-15 ENCOUNTER — OFFICE VISIT (OUTPATIENT)
Dept: CARDIOLOGY | Age: 64
End: 2024-11-15

## 2024-11-15 VITALS
HEART RATE: 59 BPM | SYSTOLIC BLOOD PRESSURE: 114 MMHG | WEIGHT: 153.33 LBS | DIASTOLIC BLOOD PRESSURE: 74 MMHG | HEIGHT: 61 IN | OXYGEN SATURATION: 97 % | BODY MASS INDEX: 28.95 KG/M2

## 2024-11-15 DIAGNOSIS — G47.33 OBSTRUCTIVE SLEEP APNEA SYNDROME: ICD-10-CM

## 2024-11-15 DIAGNOSIS — E78.00 PURE HYPERCHOLESTEROLEMIA: ICD-10-CM

## 2024-11-15 DIAGNOSIS — R07.2 PRECORDIAL PAIN: ICD-10-CM

## 2024-11-15 DIAGNOSIS — I50.32 CHRONIC DIASTOLIC HEART FAILURE  (CMD): Primary | ICD-10-CM

## 2024-11-15 RX ORDER — CLONAZEPAM 2 MG/1
2 TABLET ORAL
COMMUNITY
Start: 2024-08-28

## 2024-11-15 RX ORDER — MODAFINIL 200 MG/1
200 TABLET ORAL DAILY
COMMUNITY
Start: 2024-07-18

## 2024-11-15 SDOH — HEALTH STABILITY: PHYSICAL HEALTH: ON AVERAGE, HOW MANY DAYS PER WEEK DO YOU ENGAGE IN MODERATE TO STRENUOUS EXERCISE (LIKE A BRISK WALK)?: 0 DAYS

## 2024-11-15 SDOH — HEALTH STABILITY: PHYSICAL HEALTH: ON AVERAGE, HOW MANY MINUTES DO YOU ENGAGE IN EXERCISE AT THIS LEVEL?: 0 MIN

## 2024-11-15 ASSESSMENT — PATIENT HEALTH QUESTIONNAIRE - PHQ9
2. FEELING DOWN, DEPRESSED OR HOPELESS: NOT AT ALL
CLINICAL INTERPRETATION OF PHQ2 SCORE: NO FURTHER SCREENING NEEDED
IS THE PATIENT ABLE TO COGNITIVELY COMPLETE THE PHQ9: NO
SUM OF ALL RESPONSES TO PHQ9 QUESTIONS 1 AND 2: 0
SUM OF ALL RESPONSES TO PHQ9 QUESTIONS 1 AND 2: 0
1. LITTLE INTEREST OR PLEASURE IN DOING THINGS: NOT AT ALL

## 2024-11-21 ENCOUNTER — OFFICE VISIT (OUTPATIENT)
Dept: SURGERY | Facility: CLINIC | Age: 64
End: 2024-11-21
Payer: MEDICARE

## 2024-11-21 VITALS
HEART RATE: 53 BPM | BODY MASS INDEX: 30.73 KG/M2 | OXYGEN SATURATION: 98 % | WEIGHT: 156.5 LBS | HEIGHT: 60 IN | SYSTOLIC BLOOD PRESSURE: 128 MMHG | DIASTOLIC BLOOD PRESSURE: 80 MMHG

## 2024-11-21 DIAGNOSIS — Z51.81 ENCOUNTER FOR THERAPEUTIC DRUG MONITORING: ICD-10-CM

## 2024-11-21 DIAGNOSIS — E78.2 MIXED HYPERLIPIDEMIA: ICD-10-CM

## 2024-11-21 DIAGNOSIS — R63.2 BINGE EATING: ICD-10-CM

## 2024-11-21 DIAGNOSIS — E66.9 OBESITY (BMI 30-39.9): ICD-10-CM

## 2024-11-21 DIAGNOSIS — I10 ESSENTIAL HYPERTENSION: Primary | ICD-10-CM

## 2024-11-21 PROCEDURE — 99214 OFFICE O/P EST MOD 30 MIN: CPT | Performed by: INTERNAL MEDICINE

## 2024-11-21 RX ORDER — ACETAMINOPHEN 500 MG
1000 TABLET ORAL EVERY 6 HOURS PRN
COMMUNITY
Start: 2023-12-01

## 2024-11-21 NOTE — PROGRESS NOTES
[facility] BARIATRIC AND WEIGHT LOSS CLINIC  [address] [facility] [address]  [facility] [address]  Dept: [phone]             Patient:  Vaishali Darling  :      1960  MRN:      [id number]    Chief Complaint:    Chief Complaint   Patient presents with    Follow - Up    Weight Management       SUBJECTIVE     History of Present Illness:  Vaishali is being seen today for a follow-up for non surgical weight loss    Past Medical History:   Past Medical History:    Anxiety state, unspecified    Arthritis    Back problem    SPASMS HERNIATED DISC  IN C SPINE    Behcet's disease (HCC)    NEURO, BRAIN, GI  AND SKIN INVOLVEMENT    Binge eating    Carpal tunnel syndrome, bilateral    Cervical disc disease    MRI [facility] 11-15, jw C6-7    CHF (congestive heart failure) ([facility])    Cholelithiasis    CONGESTIVE HEART FAILURE       being controled - has issue with edema in lower extremities    Depression    Diastolic dysfunction    2-D echocardiogram normal 2015 at [place] [facility]    Diverticulosis of colon (without mention of hemorrhage)    Chris-Danlos syndrome type III (HCC)    hypermobile    Fibromyalgia    GERD (gastroesophageal reflux disease)    Glenohumeral arthritis, right    MRI [facility] 11-15; sees Dr. Serrato- ortho    Hammertoe    Hand fracture    L 5th digit    Hearing impairment    bilateral hearing aids    Hepatitis A    LONG AGO    Hepatitis, unspecified    Hep A    High blood pressure    Pt denies    Hypercholesteremia    Hypertension    Hypertriglyceridemia    Impaired glucose tolerance    Migraines    Morbid obesity with BMI of 45.0-49.9, adult (HCC)    Miller's neuroma    Neuropathy    toes and left foot    BRISA on CPAP    Osteoarthritis    generalized    Pancreatic divisum    MRCP 2015    Pneumonia, organism unspecified(486)    Pre-diabetes    Seizure disorder (HCC)    olefactory seizures history not since age 26    Spondylosis of cervical region without myelopathy or radiculopathy    MRI [facility] 11-15     Unspecified sleep apnea    BIPAP    Visual impairment    glasses        Comorbidities:  SOB/REYES-Improvement?  yes, Back pain-Improvement?  yes, Joint pain-Improvement?  yes, GERD-Improvement?  yes, Hypertension-Improvement?  yes, EAMON-Improvement?  yes and Snoring-Improvement?  yes    OBJECTIVE     Vitals: /80   Pulse 53   Ht 5' (1.524 m)   Wt 156 lb 8 oz (71 kg)   LMP 01/20/2011   SpO2 98%   BMI 30.56 kg/m²     Initial weight loss: +17   Total weight loss: -101                      Start weight: 258.7    Wt Readings from Last 3 Encounters:   11/21/24 156 lb 8 oz (71 kg)   07/25/24 144 lb 2.9 oz (65.4 kg)   02/29/24 139 lb 4.8 oz (63.2 kg)       Patient Medications:    Current Outpatient Medications   Medication Sig Dispense Refill    acetaminophen 500 MG Oral Tab Take 2 tablets (1,000 mg total) by mouth every 6 (six) hours as needed.      CLONAZEPAM 2 MG Oral Tab Take 1 tablet (2 mg total) by mouth 2 (two) times daily as needed for Anxiety. 60 tablet 0    LAMOTRIGINE 200 MG Oral Tab Take 1 tablet by mouth 2 times daily. (Patient taking differently: Take 1 tablet (200 mg total) by mouth daily.) 60 tablet 0    gabapentin 300 MG Oral Cap Take 2 capsules (600 mg total) by mouth 2 (two) times daily. 120 capsule 2    modafinil 200 MG Oral Tab Take 1 tablet (200 mg total) by mouth daily. 30 tablet 2    traZODone 50 MG Oral Tab Take 2 tablets (100 mg total) by mouth nightly as needed for Sleep. 60 tablet 2    amphetamine-dextroamphetamine (ADDERALL) 20 MG Oral Tab Take 1 tablet (20 mg total) by mouth 2 (two) times daily. 60 tablet 0    DULoxetine 60 MG Oral Cap DR Particles Take 2 capsules (120 mg total) by mouth daily. 60 capsule 2    lacosamide 150 MG Oral Tab Take 1 tablet (150 mg total) by mouth 2 (two) times daily. 60 tablet 0    Omeprazole 40 MG Oral Capsule Delayed Release Take 1 capsule (40 mg total) by mouth in the morning and 1 capsule (40 mg total) before bedtime.      aspirin 81 MG Oral Tab EC Take 1  tablet (81 mg total) by mouth daily.      atorvastatin 80 MG Oral Tab Take 1 tablet (80 mg total) by mouth daily.      diclofenac 75 MG Oral Tab EC Take 1 tablet (75 mg total) by mouth 2 (two) times daily.      colchicine 0.6 MG Oral Tab Take 1 tablet (0.6 mg total) by mouth 2 (two) times daily.      oxyCODONE-acetaminophen  MG Oral Tab Take 1 tablet by mouth 3 (three) times daily as needed for Pain.      metoprolol succinate ER 25 MG Oral Tablet 24 Hr Take 1 tablet (25 mg total) by mouth daily.      ondansetron 4 MG Oral Tablet Dispersible Take 2 tablets (8 mg total) by mouth every 12 (twelve) hours as needed for Nausea.      VALACYCLOVIR 500 MG Oral Tab TAKE ONE TABLET BY MOUTH ONE TIME DAILY (Patient taking differently: Take 1 tablet (500 mg total) by mouth daily.) 90 tablet 3    CYCLOBENZAPRINE 10 MG Oral Tab Take 1 tablet (10 mg total) by mouth 3 (three) times daily as needed for Muscle spasms. 60 tablet 0    BUTALBITAL-ASPIRIN-CAFFEINE -40 MG Oral Cap TAKE 1 CAPSULE BY MOUTH EVERY SIX HOURS AS NEEDED FOR PAIN 50 capsule 0    Wheat Dextrin (BENEFIBER) Oral Powder Use 1 scoop daily in 8 ounces of your desired beverage 1 Can 0    Polyethylene Glycol 3350 (SM CLEARLAX) Oral Powder MIX 17 GRAMS IN LIQUID AND DRINK DAILY 510 g 3    Probiotic Product (PROBIOTIC OR) Take 1 capsule by mouth daily.      lamoTRIgine 100 MG Oral Tab Take 1 tablet (100 mg total) by mouth daily. Take at noon (Patient not taking: Reported on 11/21/2024) 30 tablet 2    diclofenac 75 MG Oral Tab EC Take 1 tablet (75 mg total) by mouth 2 (two) times daily. (Patient not taking: Reported on 11/21/2024)      Calcium Carbonate-Vitamin D (CALCIUM 600-VITAMIN D3 OR) Take 1 tablet by mouth daily. (Patient not taking: Reported on 11/21/2024)      zinc sulfate 220 (50 Zn) MG Oral Cap Take 1 capsule (220 mg total) by mouth daily. (Patient not taking: Reported on 11/21/2024)      Multiple Vitamins-Minerals (CENTRUM SILVER 50+WOMEN OR) Take 1  tablet by mouth daily. (Patient not taking: Reported on 2024)      aspirin-acetaminophen-caffeine 250-250-65 MG Oral Tab Take 2 tablets by mouth every 4 (four) hours as needed for Pain. (Patient not taking: Reported on 2024)      Norethindrone-Eth Estradiol 1-5 MG-MCG Oral Tab Take 1 tablet by mouth daily. (Patient not taking: Reported on 2024)       Allergies:  Latex, Tequin [gatifloxacin], Adhesive tape, and Imitrex [sumatriptan]     Social History:    Social History     Socioeconomic History    Marital status:      Spouse name: Not on file    Number of children: Not on file    Years of education: Not on file    Highest education level: Not on file   Occupational History    Not on file   Tobacco Use    Smoking status: Former     Current packs/day: 0.00     Average packs/day: 0.5 packs/day for 5.0 years (2.5 ttl pk-yrs)     Types: Cigarettes     Start date: 1986     Quit date: 1991     Years since quittin.9    Smokeless tobacco: Never   Vaping Use    Vaping status: Never Used   Substance and Sexual Activity    Alcohol use: No     Alcohol/week: 0.0 standard drinks of alcohol    Drug use: No    Sexual activity: Not on file   Other Topics Concern    Not on file   Social History Narrative    Not on file     Social Drivers of Health     Financial Resource Strain: Low Risk  (2024)    Financial Resource Strain     Difficulty of Paying Living Expenses: Not on file     Med Affordability: No   Food Insecurity: No Food Insecurity (2024)    Received from [facility]    Hunger Vital Sign     Worried About Running Out of Food in the Last Year: Never true     Ran Out of Food in the Last Year: Never true   Transportation Needs: Unmet Transportation Needs (2024)    Received from [facility]    PRAPARE - Transportation     Lack of Transportation (Medical): No     Lack of Transportation (Non-Medical): Yes   Physical Activity: Unknown (2024)    Received from [facility]     Exercise Vital Sign     Days of Exercise per Week: 0 days     Minutes of Exercise per Session: Patient declined   Recent Concern: Physical Activity - High Risk (11/15/2024)    Received from [facility]    Exercise Vital Sign     On average, how many days per week do you engage in moderate to strenuous exercise (like a brisk walk)?: 0 days     On average, how many minutes do you engage in exercise at this level?: 0 min   Stress: Stress Concern Present (1/3/2022)    Received from [facility], [facility]    [employer] - Occupational Stress Questionnaire     Feeling of Stress : To some extent   Social Connections: Low Risk  (2023)    Received from [facility], [facility]    Social Connections     How often do you see or talk to people that you care about and feel close to? (For example: talking to friends on the phone, visiting friends or family, going to [employer] or club meetings): 5 or more times a week   Housing Stability: Low Risk  (2024)    Received from [facility]    Housing Stability     What is your living situation today?: I have a steady place to live     Surgical History:    Past Surgical History:   Procedure Laterality Date    Appendectomy      Arthroscopy of joint unlisted Bilateral     right shoulder and both knees      1994 x2, 1992 x 1    total of 3    Cholecystectomy      Colonoscopy      Egd N/A 2020    Procedure: ESOPHAGOGASTRODUODENOSCOPY, COLONOSCOPY, POSSIBLE BIOPSY, POSSIBLE POLYPECTOMY 85076, 01310;  Surgeon: Kwabena Mayer MD;  [place] [facility]    Hernia surgery  10/22/2019    Umb hernia repair/ASc-Dr Adan    Other      , and 2014- deborah joint toes/ had bone grafts with stem cells recreated both toes/        Other  had numerous surgeries    Great toe replacements removed and then reconstructed     Other      reconstructive surgery on left foot with hardware then it was removed     Other Right     tkr    Other  12/2018    Revision 5th metatarsal fusion    Other surgical history      BILATERAL TOE joint REPLACEMENT-2006     Other surgical history      right shoulder aa/dcr/debridement rotator cuff     Other surgical history  1/2017    L shoulder replacement    Other surgical history      R foot surgery x4    Tonsillectomy       Family History:    Family History   Problem Relation Age of Onset    Diabetes Father     Cancer Father         prostate cancer    Lung Disorder Mother         COPD       Food Journal  Reviewed and Discussed:       Patient has a Food Journal?: yes   Patient is reading nutrition labels?  yes  Average Caloric Intake:     Average CHO Intake: <100  Is patient exercising? yes  Type of exercise? adl's    Eating Habits  Patient states the following:  Eats 3 meal(s) per day  Length of time it takes to consume a meal:  20  # of snacks per day: 1 Type of snacks: black berries  Amount of soda consumption per day:  0  Amount of water (in ounces) per day:  64  Drinking between meals only:  yes  Toughest challenge:  Exercise due to pain    Nutritional Goals  Limit carbohydrates to 100 gms per day, Eat 100-200 calories within 1 hour of waking  and Eat 3-4 cups of fresh fruits or vegetables daily    Behavior Modifications Reviewed and Discussed  Eat breakfast, Eat 3 meals per day, Plan meals in advance, Read nutrition labels, Drink 64 oz of water per day, Maintain a daily food journal, No drinking 30 minutes before or after meals, Utlize portion control strategies to reduce calorie intake, Identify triggers for eating and manage cues and Eat slowly and take 20 to 30 minutes to complete each meal    Exercise Goals Reviewed and Discussed    Walk for  45 Minutes and Chair exercises for  45 Minutes    ROS:    Constitutional: positive for fatigue  Respiratory: positive for dyspnea on exertion  Cardiovascular: negative  Gastrointestinal: positive for constipation  Musculoskeletal:positive for  arthralgias and back pain  Neurological: positive for dizziness  Behavioral/Psych: negative  Endocrine: negative  All other systems were reviewed and are negative    Physical Exam:     General appearance: alert, appears stated age and cooperative  Head: Normocephalic, without obvious abnormality, atraumatic  Lungs: clear to auscultation bilaterally  Heart: S1, S2 normal, no murmur, click, rub or gallop, regular rate and rhythm  Abdomen: soft, non-tender; bowel sounds normal; no masses,  no organomegaly  Extremities: edema trace right 2+ left trace  Pulses: 2+ and symmetric  Skin: clean, dry and intact      ASSESSMENT     HYPERTENSION:  The patient's blood pressure has been well controlled.  she has been checking it as instructed and has remained in relatively good control.    OBSTRUCTIVE SLEEP APNEA: The patient states her sleep apnea has been stable since the last clinic visit. There has not been any increase in hyper-somnolence.     DEPRESSION:  The patient states that her depression has been relatively well controlled.  she denies any recent changes in her irritability, concentration, sleep, libido or mood.  she denies any suicidal ideation.      Encounter Diagnosis(ses):   Encounter Diagnoses   Name Primary?    Essential hypertension Yes    Mixed hyperlipidemia     Binge eating     Encounter for therapeutic drug monitoring     Obesity (BMI 30-39.9)        PLAN   No orders of the defined types were placed in this encounter.    Patient is not interested in bariatric surgery. Patient desires to pursue traditional weight loss at this time.      HYPERTENSION: Blood pressure stable on the above medications. No interval change in antihypertensive medication.     Patient was instructed to continue wearing their CPaP as recommended.     Constipation: improving with meds.     Goals for next month:  1. Keep a food log.  2. Drink 48-64 ounces of non-caloric beverages per day. No fruit juices or regular soda.  3. Increase  activity-upper body exercises, walk 10 minutes per day.  4. Increase fruit and vegetable servings to 5-6 per day.      Doing well    Has support from      Adderall by psych team     Recent shoulder surgery      Follow up with pcp    Rosalio Singleton MD

## 2024-12-12 ENCOUNTER — NURSE TRIAGE (OUTPATIENT)
Dept: TELEHEALTH | Age: 64
End: 2024-12-12

## 2024-12-12 ENCOUNTER — OFFICE VISIT (OUTPATIENT)
Dept: CARDIOLOGY | Age: 64
End: 2024-12-12

## 2024-12-12 ENCOUNTER — HOSPITAL ENCOUNTER (EMERGENCY)
Age: 64
Discharge: LEFT BEFORE DISCHARGE | End: 2024-12-12

## 2024-12-12 VITALS
WEIGHT: 159.83 LBS | HEART RATE: 82 BPM | BODY MASS INDEX: 31.38 KG/M2 | HEIGHT: 60 IN | SYSTOLIC BLOOD PRESSURE: 178 MMHG | TEMPERATURE: 98.1 F | OXYGEN SATURATION: 100 % | DIASTOLIC BLOOD PRESSURE: 97 MMHG | RESPIRATION RATE: 18 BRPM

## 2024-12-12 VITALS
SYSTOLIC BLOOD PRESSURE: 164 MMHG | DIASTOLIC BLOOD PRESSURE: 99 MMHG | BODY MASS INDEX: 29.82 KG/M2 | HEART RATE: 69 BPM | OXYGEN SATURATION: 97 % | WEIGHT: 157.96 LBS | HEIGHT: 61 IN

## 2024-12-12 DIAGNOSIS — R07.2 PRECORDIAL PAIN: ICD-10-CM

## 2024-12-12 DIAGNOSIS — I50.32 CHRONIC DIASTOLIC HEART FAILURE  (CMD): Primary | ICD-10-CM

## 2024-12-12 DIAGNOSIS — G47.33 OBSTRUCTIVE SLEEP APNEA SYNDROME: ICD-10-CM

## 2024-12-12 DIAGNOSIS — R60.0 LOCALIZED EDEMA: ICD-10-CM

## 2024-12-12 DIAGNOSIS — E78.00 PURE HYPERCHOLESTEROLEMIA: ICD-10-CM

## 2024-12-12 PROCEDURE — 93005 ELECTROCARDIOGRAM TRACING: CPT | Performed by: EMERGENCY MEDICINE

## 2024-12-12 PROCEDURE — 93010 ELECTROCARDIOGRAM REPORT: CPT | Performed by: INTERNAL MEDICINE

## 2024-12-12 RX ORDER — ONDANSETRON 8 MG/1
8 TABLET, FILM COATED ORAL EVERY 8 HOURS PRN
COMMUNITY

## 2024-12-12 RX ORDER — CLOBETASOL PROPIONATE 0.5 MG/G
CREAM TOPICAL 2 TIMES DAILY
COMMUNITY

## 2024-12-12 ASSESSMENT — PATIENT HEALTH QUESTIONNAIRE - PHQ9
CLINICAL INTERPRETATION OF PHQ2 SCORE: NO FURTHER SCREENING NEEDED
SUM OF ALL RESPONSES TO PHQ9 QUESTIONS 1 AND 2: 0
2. FEELING DOWN, DEPRESSED OR HOPELESS: NOT AT ALL
1. LITTLE INTEREST OR PLEASURE IN DOING THINGS: NOT AT ALL
SUM OF ALL RESPONSES TO PHQ9 QUESTIONS 1 AND 2: 0

## 2024-12-13 LAB
ATRIAL RATE (BPM): 89
P AXIS (DEGREES): 24
PR-INTERVAL (MSEC): 164
QRS-INTERVAL (MSEC): 78
QT-INTERVAL (MSEC): 346
QTC: 421
R AXIS (DEGREES): -26
REPORT TEXT: NORMAL
T AXIS (DEGREES): -51
VENTRICULAR RATE EKG/MIN (BPM): 89

## 2024-12-16 ENCOUNTER — TELEPHONE (OUTPATIENT)
Dept: CARDIOLOGY | Age: 64
End: 2024-12-16

## 2025-01-15 RX ORDER — METOPROLOL SUCCINATE 25 MG/1
25 TABLET, EXTENDED RELEASE ORAL DAILY
Qty: 90 TABLET | Refills: 0 | Status: SHIPPED | OUTPATIENT
Start: 2025-01-15

## 2025-03-05 DIAGNOSIS — G47.33 OSA ON CPAP: Primary | ICD-10-CM

## 2025-03-05 RX ORDER — TIRZEPATIDE 2.5 MG/.5ML
2.5 INJECTION, SOLUTION SUBCUTANEOUS WEEKLY
Qty: 3 ML | Refills: 2 | Status: SHIPPED | OUTPATIENT
Start: 2025-03-05

## 2025-03-06 ENCOUNTER — APPOINTMENT (OUTPATIENT)
Dept: CARDIOLOGY | Age: 65
End: 2025-03-06

## 2025-03-06 ENCOUNTER — TELEPHONE (OUTPATIENT)
Dept: SURGERY | Facility: CLINIC | Age: 65
End: 2025-03-06

## 2025-03-11 ENCOUNTER — TELEPHONE (OUTPATIENT)
Dept: SURGERY | Facility: CLINIC | Age: 65
End: 2025-03-11

## 2025-03-13 ENCOUNTER — APPOINTMENT (OUTPATIENT)
Dept: CARDIOLOGY | Age: 65
End: 2025-03-13

## 2025-03-17 DIAGNOSIS — E66.9 OBESITY (BMI 30-39.9): Primary | ICD-10-CM

## 2025-03-17 DIAGNOSIS — G47.33 OSA ON CPAP: ICD-10-CM

## 2025-03-17 RX ORDER — TIRZEPATIDE 2.5 MG/.5ML
2.5 INJECTION, SOLUTION SUBCUTANEOUS WEEKLY
Qty: 3 ML | Refills: 2 | Status: SHIPPED | OUTPATIENT
Start: 2025-03-17 | End: 2025-03-17

## 2025-03-17 RX ORDER — TIRZEPATIDE 2.5 MG/.5ML
2.5 INJECTION, SOLUTION SUBCUTANEOUS WEEKLY
Qty: 2 ML | Refills: 5 | Status: SHIPPED | OUTPATIENT
Start: 2025-03-17

## 2025-03-21 ENCOUNTER — OFFICE VISIT (OUTPATIENT)
Dept: CARDIOLOGY | Age: 65
End: 2025-03-21

## 2025-03-21 VITALS
SYSTOLIC BLOOD PRESSURE: 125 MMHG | HEART RATE: 71 BPM | DIASTOLIC BLOOD PRESSURE: 76 MMHG | WEIGHT: 152.56 LBS | BODY MASS INDEX: 29.79 KG/M2 | OXYGEN SATURATION: 98 %

## 2025-03-21 DIAGNOSIS — G47.33 OBSTRUCTIVE SLEEP APNEA SYNDROME: ICD-10-CM

## 2025-03-21 DIAGNOSIS — E78.00 PURE HYPERCHOLESTEROLEMIA: ICD-10-CM

## 2025-03-21 DIAGNOSIS — I50.32 CHRONIC DIASTOLIC HEART FAILURE  (CMD): Primary | ICD-10-CM

## 2025-03-21 DIAGNOSIS — M35.2 BEHCET'S DISEASE  (CMD): ICD-10-CM

## 2025-03-21 DIAGNOSIS — R07.2 PRECORDIAL PAIN: ICD-10-CM

## 2025-03-21 DIAGNOSIS — Q79.62 EHLERS-DANLOS SYNDROME, TYPE 3 (CMD): ICD-10-CM

## 2025-03-21 ASSESSMENT — PATIENT HEALTH QUESTIONNAIRE - PHQ9
1. LITTLE INTEREST OR PLEASURE IN DOING THINGS: NOT AT ALL
2. FEELING DOWN, DEPRESSED OR HOPELESS: NOT AT ALL
CLINICAL INTERPRETATION OF PHQ2 SCORE: NO FURTHER SCREENING NEEDED
SUM OF ALL RESPONSES TO PHQ9 QUESTIONS 1 AND 2: 0
SUM OF ALL RESPONSES TO PHQ9 QUESTIONS 1 AND 2: 0

## 2025-03-31 ENCOUNTER — TELEPHONE (OUTPATIENT)
Dept: SURGERY | Facility: CLINIC | Age: 65
End: 2025-03-31

## 2025-04-03 PROBLEM — M25.512 LEFT SHOULDER PAIN: Status: ACTIVE | Noted: 2019-05-02

## 2025-04-03 PROBLEM — F33.1 MODERATE EPISODE OF RECURRENT MAJOR DEPRESSIVE DISORDER (CMD): Status: ACTIVE | Noted: 2024-09-14

## 2025-04-03 PROBLEM — M47.816 LUMBAR SPONDYLOSIS: Status: ACTIVE | Noted: 2018-05-08

## 2025-04-03 PROBLEM — E66.3 OVERWEIGHT WITH BODY MASS INDEX (BMI) 25.0-29.9: Status: ACTIVE | Noted: 2021-07-09

## 2025-04-03 PROBLEM — M35.9 DISORDER OF CONNECTIVE TISSUE  (CMD): Status: ACTIVE | Noted: 2021-11-03

## 2025-04-03 PROBLEM — M43.10 SPONDYLOLISTHESIS, GRADE 1: Status: ACTIVE | Noted: 2018-05-08

## 2025-04-03 PROBLEM — R74.8 ABNORMAL LEVELS OF OTHER SERUM ENZYMES: Status: ACTIVE | Noted: 2018-06-07

## 2025-04-03 PROBLEM — R63.0 ANOREXIA: Status: ACTIVE | Noted: 2020-09-11

## 2025-04-03 PROBLEM — R90.89 ABNORMAL FINDING ON MRI OF BRAIN: Status: ACTIVE | Noted: 2024-05-03

## 2025-04-03 PROBLEM — R11.0 NAUSEA: Status: ACTIVE | Noted: 2017-10-05

## 2025-04-03 PROBLEM — M85.80 OSTEOPENIA: Status: ACTIVE | Noted: 2019-04-26

## 2025-04-03 PROBLEM — M47.817 LUMBOSACRAL SPONDYLOSIS WITHOUT MYELOPATHY: Status: ACTIVE | Noted: 2025-02-19

## 2025-04-03 PROBLEM — M48.02 FORAMINAL STENOSIS OF CERVICAL REGION: Status: ACTIVE | Noted: 2018-06-19

## 2025-04-03 PROBLEM — Z96.9 RETAINED ORTHOPEDIC HARDWARE: Status: ACTIVE | Noted: 2017-05-18

## 2025-04-03 PROBLEM — R25.1 EPISODE OF SHAKING: Status: ACTIVE | Noted: 2024-03-07

## 2025-04-03 PROBLEM — E66.9 OBESITY (BMI 30-39.9): Status: ACTIVE | Noted: 2019-04-19

## 2025-04-03 PROBLEM — G40.109 FOCAL EPILEPSY  (CMD): Status: ACTIVE | Noted: 2024-06-21

## 2025-04-03 PROBLEM — M51.369 DEGENERATION OF INTERVERTEBRAL DISC OF LUMBAR REGION: Status: ACTIVE | Noted: 2018-05-08

## 2025-04-03 PROBLEM — R63.4 WEIGHT LOSS: Status: ACTIVE | Noted: 2020-09-11

## 2025-04-03 PROBLEM — R63.2 BINGE EATING: Status: ACTIVE | Noted: 2017-11-09

## 2025-04-03 PROBLEM — R74.8 ELEVATED LIVER ENZYMES: Status: ACTIVE | Noted: 2018-06-07

## 2025-04-03 PROBLEM — M43.16 SPONDYLOLISTHESIS AT L4-L5 LEVEL: Status: ACTIVE | Noted: 2018-05-08

## 2025-04-03 PROBLEM — M24.28 LIGAMENTUM FLAVUM HYPERTROPHY: Status: ACTIVE | Noted: 2018-05-08

## 2025-04-03 PROBLEM — M25.511 PAIN OF BOTH SHOULDER JOINTS: Status: ACTIVE | Noted: 2017-01-24

## 2025-04-03 PROBLEM — R56.9 SEIZURE  (CMD): Status: ACTIVE | Noted: 2024-01-25

## 2025-04-03 PROBLEM — M79.645 THUMB PAIN, LEFT: Status: ACTIVE | Noted: 2021-06-17

## 2025-04-03 PROBLEM — M96.89 NONUNION OF BONE AFTER OSTEOTOMY: Status: ACTIVE | Noted: 2019-08-01

## 2025-04-03 PROBLEM — L40.9 PSORIASIS: Status: ACTIVE | Noted: 2018-08-14

## 2025-04-03 PROBLEM — Q79.60 EDS (EHLERS-DANLOS SYNDROME) (CMD): Status: ACTIVE | Noted: 2025-04-03

## 2025-04-03 PROBLEM — Z01.818 PREOP EXAM FOR INTERNAL MEDICINE: Status: ACTIVE | Noted: 2025-04-03

## 2025-04-03 PROBLEM — G47.33 OSA (OBSTRUCTIVE SLEEP APNEA): Status: ACTIVE | Noted: 2025-04-03

## 2025-04-03 PROBLEM — Z96.611 HISTORY OF TOTAL REPLACEMENT OF RIGHT SHOULDER JOINT: Status: ACTIVE | Noted: 2024-01-18

## 2025-04-03 PROBLEM — D50.9 IRON DEFICIENCY ANEMIA: Status: ACTIVE | Noted: 2018-06-07

## 2025-04-03 PROBLEM — R51.9 NONINTRACTABLE EPISODIC HEADACHE: Status: ACTIVE | Noted: 2024-07-24

## 2025-04-03 PROBLEM — Z86.0100 HISTORY OF COLON POLYPS: Status: ACTIVE | Noted: 2020-09-11

## 2025-04-03 PROBLEM — R52 PAIN: Status: ACTIVE | Noted: 2024-07-25

## 2025-04-03 PROBLEM — Z48.89 AFTERCARE FOLLOWING SURGERY TO BODY SYSTEM: Status: ACTIVE | Noted: 2020-01-08

## 2025-04-03 PROBLEM — M71.38 SYNOVIAL CYST OF LUMBAR FACET JOINT: Status: ACTIVE | Noted: 2018-05-08

## 2025-04-03 PROBLEM — Z96.619 STATUS POST TOTAL SHOULDER ARTHROPLASTY: Status: ACTIVE | Noted: 2017-03-22

## 2025-04-03 PROBLEM — I10 ESSENTIAL HYPERTENSION: Status: ACTIVE | Noted: 2017-06-19

## 2025-04-03 PROBLEM — T84.84XA PAINFUL ORTHOPAEDIC HARDWARE (CMD): Status: ACTIVE | Noted: 2023-05-31

## 2025-04-03 PROBLEM — M25.512 PAIN OF BOTH SHOULDER JOINTS: Status: ACTIVE | Noted: 2017-01-24

## 2025-04-03 PROBLEM — G40.109: Status: ACTIVE | Noted: 2024-09-18

## 2025-04-03 PROBLEM — F32.5 DEPRESSION, MAJOR, IN REMISSION (CMD): Status: ACTIVE | Noted: 2024-01-25

## 2025-04-03 PROBLEM — S43.439A GLENOID LABRUM TEAR: Status: ACTIVE | Noted: 2021-11-03

## 2025-04-03 PROBLEM — Z71.9 ENCOUNTER FOR CONSULTATION: Status: ACTIVE | Noted: 2025-04-03

## 2025-04-03 PROBLEM — Z96.611 PRESENCE OF RIGHT ARTIFICIAL SHOULDER JOINT: Status: ACTIVE | Noted: 2017-03-22

## 2025-04-03 PROBLEM — M79.645 FINGER PAIN, LEFT: Status: ACTIVE | Noted: 2019-12-26

## 2025-04-03 PROBLEM — R46.89 COGNITIVE AND BEHAVIORAL CHANGES: Status: ACTIVE | Noted: 2024-05-13

## 2025-04-03 PROBLEM — E78.2 MIXED HYPERLIPIDEMIA: Status: ACTIVE | Noted: 2017-04-04

## 2025-04-03 PROBLEM — M18.0 ARTHRITIS OF CARPOMETACARPAL (CMC) JOINT OF BOTH THUMBS: Status: ACTIVE | Noted: 2018-02-21

## 2025-04-03 PROBLEM — M50.20 HERNIATED DISC, CERVICAL: Status: ACTIVE | Noted: 2024-05-04

## 2025-04-03 PROBLEM — M48.061 SPINAL STENOSIS OF LUMBAR REGION: Status: ACTIVE | Noted: 2018-05-08

## 2025-04-03 PROBLEM — R41.89 COGNITIVE AND BEHAVIORAL CHANGES: Status: ACTIVE | Noted: 2024-05-13

## 2025-04-03 PROBLEM — M54.16 LUMBAR RADICULOPATHY: Status: ACTIVE | Noted: 2025-02-19

## 2025-04-03 PROBLEM — I50.32 CHRONIC DIASTOLIC HEART FAILURE  (CMD): Status: ACTIVE | Noted: 2025-01-06

## 2025-04-03 PROBLEM — F41.1 GENERALIZED ANXIETY DISORDER: Status: ACTIVE | Noted: 2024-07-18

## 2025-04-17 PROBLEM — N95.2 ATROPHIC VULVOVAGINITIS: Status: ACTIVE | Noted: 2025-04-17

## 2025-04-25 ENCOUNTER — APPOINTMENT (OUTPATIENT)
Dept: CT IMAGING | Age: 65
End: 2025-04-25
Attending: EMERGENCY MEDICINE

## 2025-04-25 ENCOUNTER — HOSPITAL ENCOUNTER (OUTPATIENT)
Age: 65
Setting detail: OBSERVATION
Discharge: HOME | End: 2025-04-26
Attending: EMERGENCY MEDICINE | Admitting: INTERNAL MEDICINE

## 2025-04-25 ENCOUNTER — NURSE TRIAGE (OUTPATIENT)
Dept: TELEHEALTH | Age: 65
End: 2025-04-25

## 2025-04-25 DIAGNOSIS — K92.2 LOWER GI BLEED: Primary | ICD-10-CM

## 2025-04-25 LAB
ABO + RH BLD: NORMAL
ALBUMIN SERPL-MCNC: 3.4 G/DL (ref 3.4–5)
ALP SERPL-CCNC: 114 UNITS/L (ref 45–117)
ALT SERPL-CCNC: 37 UNITS/L
ANION GAP SERPL CALC-SCNC: 12 MMOL/L (ref 7–19)
APTT PPP: 26 SEC (ref 22–32)
AST SERPL-CCNC: 52 UNITS/L
BASOPHILS # BLD: 0.1 K/MCL (ref 0–0.3)
BASOPHILS NFR BLD: 0 %
BILIRUB CONJ SERPL-MCNC: <0.1 MG/DL (ref 0–0.2)
BILIRUB SERPL-MCNC: 0.3 MG/DL (ref 0.2–1)
BLD GP AB INVEST PLASRBC-IMP: NORMAL
BLD GP AB SCN SERPL QL GEL: POSITIVE
BLOOD EXPIRATION DATE: NORMAL
BLOOD EXPIRATION DATE: NORMAL
BUN SERPL-MCNC: 31 MG/DL (ref 6–20)
BUN/CREAT SERPL: 46 (ref 7–25)
CALCIUM SERPL-MCNC: 8.7 MG/DL (ref 8.4–10.2)
CHLORIDE SERPL-SCNC: 111 MMOL/L (ref 97–110)
CO2 SERPL-SCNC: 22 MMOL/L (ref 21–32)
CREAT SERPL-MCNC: 0.68 MG/DL (ref 0.51–0.95)
CROSSMATCH RESULT: NORMAL
CROSSMATCH RESULT: NORMAL
DEPRECATED RDW RBC: 46.9 FL (ref 39–50)
DEPRECATED RDW RBC: 47.7 FL (ref 39–50)
DISPENSE STATUS: NORMAL
DISPENSE STATUS: NORMAL
EGFRCR SERPLBLD CKD-EPI 2021: >90 ML/MIN/{1.73_M2}
EOSINOPHIL # BLD: 0.2 K/MCL (ref 0–0.5)
EOSINOPHIL NFR BLD: 2 %
ERYTHROCYTE [DISTWIDTH] IN BLOOD: 13.8 % (ref 11–15)
ERYTHROCYTE [DISTWIDTH] IN BLOOD: 13.8 % (ref 11–15)
FASTING DURATION TIME PATIENT: ABNORMAL H
GLUCOSE SERPL-MCNC: 84 MG/DL (ref 70–99)
HCT VFR BLD CALC: 38.6 % (ref 36–46.5)
HCT VFR BLD CALC: 38.9 % (ref 36–46.5)
HGB BLD-MCNC: 12.4 G/DL (ref 12–15.5)
HGB BLD-MCNC: 12.4 G/DL (ref 12–15.5)
IMM GRANULOCYTES # BLD AUTO: 0 K/MCL (ref 0–0.2)
IMM GRANULOCYTES # BLD: 0 %
INR PPP: 1
ISBT BLOOD TYPE: 5100
ISBT BLOOD TYPE: 5100
LACTATE BLDV-SCNC: 1.2 MMOL/L (ref 0–2)
LIPASE SERPL-CCNC: 32 UNITS/L (ref 15–77)
LYMPHOCYTES # BLD: 1.3 K/MCL (ref 1–4)
LYMPHOCYTES NFR BLD: 11 %
MCH RBC QN AUTO: 29.9 PG (ref 26–34)
MCH RBC QN AUTO: 30 PG (ref 26–34)
MCHC RBC AUTO-ENTMCNC: 31.9 G/DL (ref 32–36.5)
MCHC RBC AUTO-ENTMCNC: 32.1 G/DL (ref 32–36.5)
MCV RBC AUTO: 93 FL (ref 78–100)
MCV RBC AUTO: 94.2 FL (ref 78–100)
MONOCYTES # BLD: 1.4 K/MCL (ref 0.3–0.9)
MONOCYTES NFR BLD: 12 %
NEUTROPHILS # BLD: 9 K/MCL (ref 1.8–7.7)
NEUTROPHILS NFR BLD: 75 %
NRBC BLD MANUAL-RTO: 0 /100 WBC
NRBC BLD MANUAL-RTO: 0 /100 WBC
PLATELET # BLD AUTO: 173 K/MCL (ref 140–450)
PLATELET # BLD AUTO: 178 K/MCL (ref 140–450)
POTASSIUM SERPL-SCNC: 4.6 MMOL/L (ref 3.4–5.1)
PRODUCT CODE: NORMAL
PRODUCT CODE: NORMAL
PRODUCT DESCRIPTION: NORMAL
PRODUCT DESCRIPTION: NORMAL
PRODUCT ID: NORMAL
PRODUCT ID: NORMAL
PROT SERPL-MCNC: 6.8 G/DL (ref 6.4–8.2)
PROTHROMBIN TIME: 10.4 SEC (ref 9.7–11.8)
RAINBOW EXTRA TUBES HOLD SPECIMEN: NORMAL
RBC # BLD: 4.13 MIL/MCL (ref 4–5.2)
RBC # BLD: 4.15 MIL/MCL (ref 4–5.2)
SODIUM SERPL-SCNC: 140 MMOL/L (ref 135–145)
TYPE AND SCREEN EXPIRATION DATE: NORMAL
UNIT BLOOD TYPE: NORMAL
UNIT BLOOD TYPE: NORMAL
UNIT NUMBER: NORMAL
UNIT NUMBER: NORMAL
WBC # BLD: 12 K/MCL (ref 4.2–11)
WBC # BLD: 7.6 K/MCL (ref 4.2–11)

## 2025-04-25 PROCEDURE — 99284 EMERGENCY DEPT VISIT MOD MDM: CPT

## 2025-04-25 PROCEDURE — 96366 THER/PROPH/DIAG IV INF ADDON: CPT

## 2025-04-25 PROCEDURE — 96375 TX/PRO/DX INJ NEW DRUG ADDON: CPT

## 2025-04-25 PROCEDURE — 10002807 HB RX 258: Performed by: INTERNAL MEDICINE

## 2025-04-25 PROCEDURE — 86922 COMPATIBILITY TEST ANTIGLOB: CPT

## 2025-04-25 PROCEDURE — 83690 ASSAY OF LIPASE: CPT | Performed by: EMERGENCY MEDICINE

## 2025-04-25 PROCEDURE — 10002801 HB RX 250 W/O HCPCS: Performed by: INTERNAL MEDICINE

## 2025-04-25 PROCEDURE — 80076 HEPATIC FUNCTION PANEL: CPT | Performed by: EMERGENCY MEDICINE

## 2025-04-25 PROCEDURE — G0378 HOSPITAL OBSERVATION PER HR: HCPCS

## 2025-04-25 PROCEDURE — 86905 BLOOD TYPING RBC ANTIGENS: CPT

## 2025-04-25 PROCEDURE — 10002801 HB RX 250 W/O HCPCS: Performed by: EMERGENCY MEDICINE

## 2025-04-25 PROCEDURE — 85730 THROMBOPLASTIN TIME PARTIAL: CPT | Performed by: EMERGENCY MEDICINE

## 2025-04-25 PROCEDURE — 96361 HYDRATE IV INFUSION ADD-ON: CPT

## 2025-04-25 PROCEDURE — 80048 BASIC METABOLIC PNL TOTAL CA: CPT | Performed by: EMERGENCY MEDICINE

## 2025-04-25 PROCEDURE — 10002800 HB RX 250 W HCPCS: Performed by: EMERGENCY MEDICINE

## 2025-04-25 PROCEDURE — 96376 TX/PRO/DX INJ SAME DRUG ADON: CPT

## 2025-04-25 PROCEDURE — 36415 COLL VENOUS BLD VENIPUNCTURE: CPT | Performed by: EMERGENCY MEDICINE

## 2025-04-25 PROCEDURE — 10004180 HB COUNTER-TRANSPORT

## 2025-04-25 PROCEDURE — 85025 COMPLETE CBC W/AUTO DIFF WBC: CPT | Performed by: EMERGENCY MEDICINE

## 2025-04-25 PROCEDURE — 83605 ASSAY OF LACTIC ACID: CPT | Performed by: EMERGENCY MEDICINE

## 2025-04-25 PROCEDURE — 10004651 HB RX, NO CHARGE ITEM: Performed by: INTERNAL MEDICINE

## 2025-04-25 PROCEDURE — 85027 COMPLETE CBC AUTOMATED: CPT | Performed by: INTERNAL MEDICINE

## 2025-04-25 PROCEDURE — 96365 THER/PROPH/DIAG IV INF INIT: CPT

## 2025-04-25 PROCEDURE — 99205 OFFICE O/P NEW HI 60 MIN: CPT | Performed by: INTERNAL MEDICINE

## 2025-04-25 PROCEDURE — 10002805 HB CONTRAST AGENT: Performed by: EMERGENCY MEDICINE

## 2025-04-25 PROCEDURE — 10002803 HB RX 637: Performed by: INTERNAL MEDICINE

## 2025-04-25 PROCEDURE — 74174 CTA ABD&PLVS W/CONTRAST: CPT

## 2025-04-25 PROCEDURE — 86870 RBC ANTIBODY IDENTIFICATION: CPT | Performed by: EMERGENCY MEDICINE

## 2025-04-25 PROCEDURE — 80175 DRUG SCREEN QUAN LAMOTRIGINE: CPT | Performed by: INTERNAL MEDICINE

## 2025-04-25 PROCEDURE — 85610 PROTHROMBIN TIME: CPT | Performed by: EMERGENCY MEDICINE

## 2025-04-25 PROCEDURE — 86850 RBC ANTIBODY SCREEN: CPT | Performed by: EMERGENCY MEDICINE

## 2025-04-25 RX ORDER — ATORVASTATIN CALCIUM 40 MG/1
80 TABLET, FILM COATED ORAL EVERY EVENING
Status: DISCONTINUED | OUTPATIENT
Start: 2025-04-25 | End: 2025-04-26 | Stop reason: HOSPADM

## 2025-04-25 RX ORDER — VALACYCLOVIR HYDROCHLORIDE 500 MG/1
500 TABLET, FILM COATED ORAL EVERY EVENING
Status: DISCONTINUED | OUTPATIENT
Start: 2025-04-25 | End: 2025-04-26 | Stop reason: HOSPADM

## 2025-04-25 RX ORDER — GABAPENTIN 300 MG/1
600 CAPSULE ORAL 2 TIMES DAILY
Status: DISCONTINUED | OUTPATIENT
Start: 2025-04-25 | End: 2025-04-26 | Stop reason: HOSPADM

## 2025-04-25 RX ORDER — TRAZODONE HYDROCHLORIDE 100 MG/1
100 TABLET ORAL NIGHTLY PRN
Status: DISCONTINUED | OUTPATIENT
Start: 2025-04-25 | End: 2025-04-25

## 2025-04-25 RX ORDER — LAMOTRIGINE 200 MG/1
1 TABLET, EXTENDED RELEASE ORAL 3 TIMES DAILY
COMMUNITY

## 2025-04-25 RX ORDER — POLYETHYLENE GLYCOL 3350 17 G/17G
17 POWDER, FOR SOLUTION ORAL DAILY PRN
Status: DISCONTINUED | OUTPATIENT
Start: 2025-04-25 | End: 2025-04-26

## 2025-04-25 RX ORDER — TRIAMCINOLONE ACETONIDE 0.1 %
PASTE (GRAM) DENTAL SEE ADMIN INSTRUCTIONS
COMMUNITY

## 2025-04-25 RX ORDER — DULOXETIN HYDROCHLORIDE 60 MG/1
60 CAPSULE, DELAYED RELEASE ORAL DAILY
Status: DISCONTINUED | OUTPATIENT
Start: 2025-04-25 | End: 2025-04-25 | Stop reason: SDUPTHER

## 2025-04-25 RX ORDER — PANTOPRAZOLE SODIUM 40 MG/1
40 TABLET, DELAYED RELEASE ORAL
Status: DISCONTINUED | OUTPATIENT
Start: 2025-04-26 | End: 2025-04-26 | Stop reason: HOSPADM

## 2025-04-25 RX ORDER — IBUPROFEN 200 MG
400 TABLET ORAL PRN
Status: ON HOLD | COMMUNITY
End: 2025-04-26 | Stop reason: HOSPADM

## 2025-04-25 RX ORDER — 0.9 % SODIUM CHLORIDE 0.9 %
10 VIAL (ML) INJECTION PRN
Status: DISCONTINUED | OUTPATIENT
Start: 2025-04-25 | End: 2025-04-26 | Stop reason: HOSPADM

## 2025-04-25 RX ORDER — ACETAMINOPHEN 650 MG/1
650 SUPPOSITORY RECTAL EVERY 4 HOURS PRN
Status: DISCONTINUED | OUTPATIENT
Start: 2025-04-25 | End: 2025-04-26 | Stop reason: HOSPADM

## 2025-04-25 RX ORDER — CLONAZEPAM 1 MG/1
2 TABLET ORAL 2 TIMES DAILY PRN
Status: DISCONTINUED | OUTPATIENT
Start: 2025-04-25 | End: 2025-04-26 | Stop reason: HOSPADM

## 2025-04-25 RX ORDER — METOPROLOL SUCCINATE 25 MG/1
25 TABLET, EXTENDED RELEASE ORAL DAILY
Status: DISCONTINUED | OUTPATIENT
Start: 2025-04-26 | End: 2025-04-26 | Stop reason: HOSPADM

## 2025-04-25 RX ORDER — AMITRIPTYLINE HYDROCHLORIDE 50 MG/1
50 TABLET ORAL NIGHTLY
COMMUNITY

## 2025-04-25 RX ORDER — ACETAMINOPHEN 500 MG
500 TABLET ORAL PRN
COMMUNITY

## 2025-04-25 RX ORDER — ONDANSETRON 2 MG/ML
4 INJECTION INTRAMUSCULAR; INTRAVENOUS EVERY 12 HOURS PRN
Status: DISCONTINUED | OUTPATIENT
Start: 2025-04-25 | End: 2025-04-26 | Stop reason: HOSPADM

## 2025-04-25 RX ORDER — COLCHICINE 0.6 MG/1
0.6 TABLET ORAL 2 TIMES DAILY
COMMUNITY

## 2025-04-25 RX ORDER — OXYCODONE AND ACETAMINOPHEN 10; 325 MG/1; MG/1
1 TABLET ORAL 4 TIMES DAILY PRN
Status: DISCONTINUED | OUTPATIENT
Start: 2025-04-25 | End: 2025-04-26 | Stop reason: HOSPADM

## 2025-04-25 RX ORDER — MODAFINIL 100 MG/1
200 TABLET ORAL DAILY
Status: DISCONTINUED | OUTPATIENT
Start: 2025-04-25 | End: 2025-04-25

## 2025-04-25 RX ORDER — ONDANSETRON 4 MG/1
4 TABLET, ORALLY DISINTEGRATING ORAL EVERY 12 HOURS PRN
Status: DISCONTINUED | OUTPATIENT
Start: 2025-04-25 | End: 2025-04-26 | Stop reason: HOSPADM

## 2025-04-25 RX ORDER — SODIUM CHLORIDE 9 MG/ML
INJECTION, SOLUTION INTRAVENOUS CONTINUOUS
Status: DISCONTINUED | OUTPATIENT
Start: 2025-04-25 | End: 2025-04-26

## 2025-04-25 RX ORDER — BUTALBITAL, ACETAMINOPHEN AND CAFFEINE 50; 325; 40 MG/1; MG/1; MG/1
1 TABLET ORAL EVERY 4 HOURS PRN
Status: DISCONTINUED | OUTPATIENT
Start: 2025-04-25 | End: 2025-04-26 | Stop reason: HOSPADM

## 2025-04-25 RX ORDER — LAMOTRIGINE 100 MG/1
200 TABLET ORAL ONCE
Status: DISCONTINUED | OUTPATIENT
Start: 2025-04-25 | End: 2025-04-26 | Stop reason: HOSPADM

## 2025-04-25 RX ORDER — CYCLOBENZAPRINE HCL 10 MG
10 TABLET ORAL 3 TIMES DAILY PRN
Status: DISCONTINUED | OUTPATIENT
Start: 2025-04-25 | End: 2025-04-26 | Stop reason: HOSPADM

## 2025-04-25 RX ORDER — ACETAMINOPHEN 325 MG/1
650 TABLET ORAL EVERY 4 HOURS PRN
Status: DISCONTINUED | OUTPATIENT
Start: 2025-04-25 | End: 2025-04-26 | Stop reason: HOSPADM

## 2025-04-25 RX ORDER — LAMOTRIGINE 100 MG/1
200 TABLET ORAL 3 TIMES DAILY
Status: DISCONTINUED | OUTPATIENT
Start: 2025-04-25 | End: 2025-04-26 | Stop reason: HOSPADM

## 2025-04-25 RX ORDER — LACOSAMIDE 50 MG/1
50 TABLET ORAL 2 TIMES DAILY
Status: DISCONTINUED | OUTPATIENT
Start: 2025-04-25 | End: 2025-04-25

## 2025-04-25 RX ORDER — ONDANSETRON 8 MG/1
8 TABLET, ORALLY DISINTEGRATING ORAL EVERY 12 HOURS PRN
COMMUNITY

## 2025-04-25 RX ORDER — 0.9 % SODIUM CHLORIDE 0.9 %
2 VIAL (ML) INJECTION EVERY 12 HOURS SCHEDULED
Status: DISCONTINUED | OUTPATIENT
Start: 2025-04-25 | End: 2025-04-26 | Stop reason: HOSPADM

## 2025-04-25 RX ADMIN — SODIUM CHLORIDE: 9 INJECTION, SOLUTION INTRAVENOUS at 20:33

## 2025-04-25 RX ADMIN — GABAPENTIN 600 MG: 300 CAPSULE ORAL at 20:26

## 2025-04-25 RX ADMIN — Medication 15 ML: at 11:26

## 2025-04-25 RX ADMIN — METRONIDAZOLE 500 MG: 500 INJECTION, SOLUTION INTRAVENOUS at 22:38

## 2025-04-25 RX ADMIN — CEFTRIAXONE 2000 MG: 2 INJECTION, POWDER, FOR SOLUTION INTRAMUSCULAR; INTRAVENOUS at 11:25

## 2025-04-25 RX ADMIN — VALACYCLOVIR HYDROCHLORIDE 500 MG: 500 TABLET, FILM COATED ORAL at 18:24

## 2025-04-25 RX ADMIN — SODIUM CHLORIDE: 9 INJECTION, SOLUTION INTRAVENOUS at 17:40

## 2025-04-25 RX ADMIN — METRONIDAZOLE 500 MG: 500 INJECTION, SOLUTION INTRAVENOUS at 11:26

## 2025-04-25 RX ADMIN — METRONIDAZOLE 500 MG: 500 INJECTION, SOLUTION INTRAVENOUS at 18:23

## 2025-04-25 RX ADMIN — ATORVASTATIN CALCIUM 80 MG: 40 TABLET, FILM COATED ORAL at 20:26

## 2025-04-25 RX ADMIN — SODIUM CHLORIDE, PRESERVATIVE FREE 2 ML: 5 INJECTION INTRAVENOUS at 20:27

## 2025-04-25 RX ADMIN — IOHEXOL 80 ML: 350 INJECTION, SOLUTION INTRAVENOUS at 09:43

## 2025-04-25 RX ADMIN — Medication 15 ML: at 17:44

## 2025-04-25 RX ADMIN — OXYCODONE AND ACETAMINOPHEN 1 TABLET: 10; 325 TABLET ORAL at 20:25

## 2025-04-25 RX ADMIN — LAMOTRIGINE 200 MG: 100 TABLET ORAL at 21:25

## 2025-04-25 RX ADMIN — OXYCODONE AND ACETAMINOPHEN 1 TABLET: 10; 325 TABLET ORAL at 12:07

## 2025-04-25 RX ADMIN — SODIUM CHLORIDE: 9 INJECTION, SOLUTION INTRAVENOUS at 23:45

## 2025-04-25 SDOH — HEALTH STABILITY: PHYSICAL HEALTH: DO YOU HAVE SERIOUS DIFFICULTY WALKING OR CLIMBING STAIRS?: NO

## 2025-04-25 SDOH — ECONOMIC STABILITY: HOUSING INSECURITY: WHAT IS YOUR LIVING SITUATION TODAY?: I HAVE A STEADY PLACE TO LIVE

## 2025-04-25 SDOH — ECONOMIC STABILITY: GENERAL

## 2025-04-25 SDOH — ECONOMIC STABILITY: INCOME INSECURITY: IN THE PAST 12 MONTHS, HAS THE ELECTRIC, GAS, OIL, OR WATER COMPANY THREATENED TO SHUT OFF SERVICE IN YOUR HOME?: NO

## 2025-04-25 SDOH — HEALTH STABILITY: PHYSICAL HEALTH: DO YOU HAVE DIFFICULTY DRESSING OR BATHING?: NO

## 2025-04-25 SDOH — ECONOMIC STABILITY: HOUSING INSECURITY: DO YOU HAVE PROBLEMS WITH ANY OF THE FOLLOWING?: NONE OF THE ABOVE

## 2025-04-25 SDOH — HEALTH STABILITY: GENERAL: BECAUSE OF A PHYSICAL, MENTAL, OR EMOTIONAL CONDITION, DO YOU HAVE DIFFICULTY DOING ERRANDS ALONE?: YES

## 2025-04-25 SDOH — ECONOMIC STABILITY: FOOD INSECURITY: WITHIN THE PAST 12 MONTHS, THE FOOD YOU BOUGHT JUST DIDN'T LAST AND YOU DIDN'T HAVE MONEY TO GET MORE.: NEVER TRUE

## 2025-04-25 SDOH — ECONOMIC STABILITY: HOUSING INSECURITY: WHAT IS YOUR LIVING SITUATION TODAY?: SPOUSE;FAMILY MEMBERS

## 2025-04-25 SDOH — SOCIAL STABILITY: SOCIAL NETWORK
HOW OFTEN DO YOU SEE OR TALK TO PEOPLE THAT YOU CARE ABOUT AND FEEL CLOSE TO? (FOR EXAMPLE: TALKING TO FRIENDS ON THE PHONE, VISITING FRIENDS OR FAMILY, GOING TO CHURCH OR CLUB MEETINGS): PATIENT UNABLE TO ANSWER

## 2025-04-25 SDOH — ECONOMIC STABILITY: TRANSPORTATION INSECURITY
IN THE PAST 12 MONTHS, HAS LACK OF RELIABLE TRANSPORTATION KEPT YOU FROM MEDICAL APPOINTMENTS, MEETINGS, WORK OR FROM GETTING THINGS NEEDED FOR DAILY LIVING?: NO

## 2025-04-25 SDOH — SOCIAL STABILITY: SOCIAL NETWORK: SUPPORT SYSTEMS: SPOUSE;FAMILY MEMBERS;CHILDREN;FRIENDS

## 2025-04-25 SDOH — ECONOMIC STABILITY: HOUSING INSECURITY: WHAT IS YOUR LIVING SITUATION TODAY?: HOUSE

## 2025-04-25 ASSESSMENT — COLUMBIA-SUICIDE SEVERITY RATING SCALE - C-SSRS
2. HAVE YOU ACTUALLY HAD ANY THOUGHTS OF KILLING YOURSELF?: NO
IS THE PATIENT ABLE TO COMPLETE C-SSRS: YES
6. HAVE YOU EVER DONE ANYTHING, STARTED TO DO ANYTHING, OR PREPARED TO DO ANYTHING TO END YOUR LIFE?: NO
1. WITHIN THE PAST MONTH, HAVE YOU WISHED YOU WERE DEAD OR WISHED YOU COULD GO TO SLEEP AND NOT WAKE UP?: NO

## 2025-04-25 ASSESSMENT — PATIENT HEALTH QUESTIONNAIRE - PHQ9
SUM OF ALL RESPONSES TO PHQ9 QUESTIONS 1 AND 2: 0
1. LITTLE INTEREST OR PLEASURE IN DOING THINGS: NOT AT ALL
SUM OF ALL RESPONSES TO PHQ9 QUESTIONS 1 AND 2: 0
2. FEELING DOWN, DEPRESSED OR HOPELESS: NOT AT ALL
IS PATIENT ABLE TO COMPLETE PHQ2 OR PHQ9: YES
CLINICAL INTERPRETATION OF PHQ2 SCORE: NO FURTHER SCREENING NEEDED

## 2025-04-25 ASSESSMENT — PAIN SCALES - GENERAL
PAINLEVEL_OUTOF10: 8
PAINLEVEL_OUTOF10: 0
PAINLEVEL_OUTOF10: 6

## 2025-04-25 ASSESSMENT — ACTIVITIES OF DAILY LIVING (ADL)
ADL_BEFORE_ADMISSION: INDEPENDENT
RECENT_DECLINE_ADL: NO
ADL_SHORT_OF_BREATH: NO
ADL_SCORE: 12

## 2025-04-25 ASSESSMENT — LIFESTYLE VARIABLES
ALCOHOL_USE_STATUS: NO OR LOW RISK WITH VALIDATED TOOL
HOW MANY STANDARD DRINKS CONTAINING ALCOHOL DO YOU HAVE ON A TYPICAL DAY: 0,1 OR 2
HOW OFTEN DO YOU HAVE A DRINK CONTAINING ALCOHOL: NEVER
AUDIT-C TOTAL SCORE: 0
HOW OFTEN DO YOU HAVE 6 OR MORE DRINKS ON ONE OCCASION: NEVER

## 2025-04-26 VITALS
OXYGEN SATURATION: 96 % | TEMPERATURE: 98.6 F | WEIGHT: 156.75 LBS | HEIGHT: 60 IN | BODY MASS INDEX: 30.77 KG/M2 | SYSTOLIC BLOOD PRESSURE: 118 MMHG | HEART RATE: 79 BPM | RESPIRATION RATE: 16 BRPM | DIASTOLIC BLOOD PRESSURE: 72 MMHG

## 2025-04-26 LAB
ANION GAP SERPL CALC-SCNC: 8 MMOL/L (ref 7–19)
BASOPHILS # BLD: 0 K/MCL (ref 0–0.3)
BASOPHILS NFR BLD: 1 %
BUN SERPL-MCNC: 19 MG/DL (ref 6–20)
BUN/CREAT SERPL: 28 (ref 7–25)
CALCIUM SERPL-MCNC: 8.5 MG/DL (ref 8.4–10.2)
CHLORIDE SERPL-SCNC: 111 MMOL/L (ref 97–110)
CO2 SERPL-SCNC: 25 MMOL/L (ref 21–32)
CREAT SERPL-MCNC: 0.67 MG/DL (ref 0.51–0.95)
DEPRECATED RDW RBC: 48.3 FL (ref 39–50)
DEPRECATED RDW RBC: 49.6 FL (ref 39–50)
EGFRCR SERPLBLD CKD-EPI 2021: >90 ML/MIN/{1.73_M2}
EOSINOPHIL # BLD: 0.2 K/MCL (ref 0–0.5)
EOSINOPHIL NFR BLD: 3 %
ERYTHROCYTE [DISTWIDTH] IN BLOOD: 13.6 % (ref 11–15)
ERYTHROCYTE [DISTWIDTH] IN BLOOD: 13.9 % (ref 11–15)
FASTING DURATION TIME PATIENT: ABNORMAL H
GLUCOSE SERPL-MCNC: 91 MG/DL (ref 70–99)
HCT VFR BLD CALC: 39 % (ref 36–46.5)
HCT VFR BLD CALC: 39.6 % (ref 36–46.5)
HGB BLD-MCNC: 12 G/DL (ref 12–15.5)
HGB BLD-MCNC: 12.5 G/DL (ref 12–15.5)
IMM GRANULOCYTES # BLD AUTO: 0 K/MCL (ref 0–0.2)
IMM GRANULOCYTES # BLD: 0 %
LYMPHOCYTES # BLD: 1.5 K/MCL (ref 1–4)
LYMPHOCYTES NFR BLD: 33 %
MCH RBC QN AUTO: 29.6 PG (ref 26–34)
MCH RBC QN AUTO: 30 PG (ref 26–34)
MCHC RBC AUTO-ENTMCNC: 30.8 G/DL (ref 32–36.5)
MCHC RBC AUTO-ENTMCNC: 31.6 G/DL (ref 32–36.5)
MCV RBC AUTO: 95.2 FL (ref 78–100)
MCV RBC AUTO: 96.3 FL (ref 78–100)
MONOCYTES # BLD: 0.5 K/MCL (ref 0.3–0.9)
MONOCYTES NFR BLD: 12 %
NEUTROPHILS # BLD: 2.3 K/MCL (ref 1.8–7.7)
NEUTROPHILS NFR BLD: 51 %
NRBC BLD MANUAL-RTO: 0 /100 WBC
NRBC BLD MANUAL-RTO: 0 /100 WBC
PLATELET # BLD AUTO: 150 K/MCL (ref 140–450)
PLATELET # BLD AUTO: 152 K/MCL (ref 140–450)
POTASSIUM SERPL-SCNC: 4.5 MMOL/L (ref 3.4–5.1)
RBC # BLD: 4.05 MIL/MCL (ref 4–5.2)
RBC # BLD: 4.16 MIL/MCL (ref 4–5.2)
SODIUM SERPL-SCNC: 139 MMOL/L (ref 135–145)
WBC # BLD: 3.9 K/MCL (ref 4.2–11)
WBC # BLD: 4.5 K/MCL (ref 4.2–11)

## 2025-04-26 PROCEDURE — 10002801 HB RX 250 W/O HCPCS: Performed by: INTERNAL MEDICINE

## 2025-04-26 PROCEDURE — 10002807 HB RX 258: Performed by: INTERNAL MEDICINE

## 2025-04-26 PROCEDURE — 36415 COLL VENOUS BLD VENIPUNCTURE: CPT | Performed by: INTERNAL MEDICINE

## 2025-04-26 PROCEDURE — G0378 HOSPITAL OBSERVATION PER HR: HCPCS

## 2025-04-26 PROCEDURE — 10004651 HB RX, NO CHARGE ITEM: Performed by: INTERNAL MEDICINE

## 2025-04-26 PROCEDURE — 96376 TX/PRO/DX INJ SAME DRUG ADON: CPT

## 2025-04-26 PROCEDURE — 10004180 HB COUNTER-TRANSPORT

## 2025-04-26 PROCEDURE — 85027 COMPLETE CBC AUTOMATED: CPT | Performed by: INTERNAL MEDICINE

## 2025-04-26 PROCEDURE — 96361 HYDRATE IV INFUSION ADD-ON: CPT

## 2025-04-26 PROCEDURE — 99215 OFFICE O/P EST HI 40 MIN: CPT | Performed by: INTERNAL MEDICINE

## 2025-04-26 PROCEDURE — 80048 BASIC METABOLIC PNL TOTAL CA: CPT | Performed by: INTERNAL MEDICINE

## 2025-04-26 PROCEDURE — 10002803 HB RX 637: Performed by: INTERNAL MEDICINE

## 2025-04-26 PROCEDURE — 85025 COMPLETE CBC W/AUTO DIFF WBC: CPT | Performed by: INTERNAL MEDICINE

## 2025-04-26 RX ORDER — CIPROFLOXACIN 500 MG/1
500 TABLET, FILM COATED ORAL
Status: DISCONTINUED | OUTPATIENT
Start: 2025-04-26 | End: 2025-04-26 | Stop reason: HOSPADM

## 2025-04-26 RX ORDER — CIPROFLOXACIN 500 MG/1
500 TABLET, FILM COATED ORAL
Qty: 18 TABLET | Refills: 0 | Status: SHIPPED | OUTPATIENT
Start: 2025-04-26 | End: 2025-05-05

## 2025-04-26 RX ORDER — POLYETHYLENE GLYCOL 3350 17 G/17G
17 POWDER, FOR SOLUTION ORAL DAILY
Status: DISCONTINUED | OUTPATIENT
Start: 2025-04-26 | End: 2025-04-26 | Stop reason: HOSPADM

## 2025-04-26 RX ORDER — AMOXICILLIN 250 MG
1 CAPSULE ORAL 2 TIMES DAILY
Status: DISCONTINUED | OUTPATIENT
Start: 2025-04-26 | End: 2025-04-26 | Stop reason: HOSPADM

## 2025-04-26 RX ORDER — METRONIDAZOLE 500 MG/1
500 TABLET ORAL 3 TIMES DAILY
Status: DISCONTINUED | OUTPATIENT
Start: 2025-04-26 | End: 2025-04-26 | Stop reason: HOSPADM

## 2025-04-26 RX ORDER — METRONIDAZOLE 500 MG/1
500 TABLET ORAL 3 TIMES DAILY
Qty: 27 TABLET | Refills: 0 | Status: SHIPPED | OUTPATIENT
Start: 2025-04-26 | End: 2025-05-05

## 2025-04-26 RX ORDER — POLYETHYLENE GLYCOL 3350 17 G/17G
17 POWDER, FOR SOLUTION ORAL DAILY
Qty: 30 PACKET | Refills: 0 | Status: SHIPPED | OUTPATIENT
Start: 2025-04-27

## 2025-04-26 RX ADMIN — METRONIDAZOLE 500 MG: 500 INJECTION, SOLUTION INTRAVENOUS at 06:14

## 2025-04-26 RX ADMIN — ACETAMINOPHEN 650 MG: 325 TABLET ORAL at 02:15

## 2025-04-26 RX ADMIN — CIPROFLOXACIN 500 MG: 500 TABLET ORAL at 09:50

## 2025-04-26 RX ADMIN — PANTOPRAZOLE SODIUM 40 MG: 40 TABLET, DELAYED RELEASE ORAL at 06:14

## 2025-04-26 RX ADMIN — CYCLOBENZAPRINE HYDROCHLORIDE 10 MG: 10 TABLET, FILM COATED ORAL at 11:27

## 2025-04-26 RX ADMIN — GABAPENTIN 600 MG: 300 CAPSULE ORAL at 09:44

## 2025-04-26 RX ADMIN — METRONIDAZOLE 500 MG: 500 TABLET ORAL at 13:34

## 2025-04-26 RX ADMIN — OXYCODONE AND ACETAMINOPHEN 1 TABLET: 10; 325 TABLET ORAL at 06:17

## 2025-04-26 RX ADMIN — LAMOTRIGINE 200 MG: 100 TABLET ORAL at 09:44

## 2025-04-26 RX ADMIN — Medication 15 ML: at 11:21

## 2025-04-26 RX ADMIN — ACETAMINOPHEN 650 MG: 325 TABLET ORAL at 11:27

## 2025-04-26 RX ADMIN — LAMOTRIGINE 200 MG: 100 TABLET ORAL at 13:34

## 2025-04-26 RX ADMIN — MAGNESIUM HYDROXIDE 15 ML: 400 SUSPENSION ORAL at 15:40

## 2025-04-26 RX ADMIN — POLYETHYLENE GLYCOL 3350 17 G: 17 POWDER, FOR SOLUTION ORAL at 09:44

## 2025-04-26 RX ADMIN — BUTALBITAL, ACETAMINOPHEN, AND CAFFEINE 1 TABLET: 325; 50; 40 TABLET ORAL at 09:55

## 2025-04-26 RX ADMIN — METOPROLOL SUCCINATE 25 MG: 25 TABLET, EXTENDED RELEASE ORAL at 09:44

## 2025-04-26 RX ADMIN — SENNOSIDES AND DOCUSATE SODIUM 1 TABLET: 50; 8.6 TABLET ORAL at 09:44

## 2025-04-26 RX ADMIN — OXYCODONE AND ACETAMINOPHEN 1 TABLET: 10; 325 TABLET ORAL at 14:54

## 2025-04-26 RX ADMIN — SODIUM CHLORIDE: 9 INJECTION, SOLUTION INTRAVENOUS at 06:54

## 2025-04-26 RX ADMIN — DULOXETINE 90 MG: 60 CAPSULE, DELAYED RELEASE ORAL at 09:44

## 2025-04-26 RX ADMIN — SODIUM CHLORIDE: 9 INJECTION, SOLUTION INTRAVENOUS at 02:11

## 2025-04-26 RX ADMIN — Medication 15 ML: at 17:23

## 2025-04-26 SDOH — SOCIAL STABILITY: SOCIAL INSECURITY: HOW OFTEN DOES ANYONE, INCLUDING FAMILY AND FRIENDS, INSULT OR TALK DOWN TO YOU?: NEVER

## 2025-04-26 SDOH — SOCIAL STABILITY: SOCIAL INSECURITY: HOW OFTEN DOES ANYONE, INCLUDING FAMILY AND FRIENDS, THREATEN YOU WITH HARM?: NEVER

## 2025-04-26 SDOH — SOCIAL STABILITY: SOCIAL INSECURITY: HOW OFTEN DOES ANYONE, INCLUDING FAMILY AND FRIENDS, PHYSICALLY HURT YOU?: NEVER

## 2025-04-26 SDOH — SOCIAL STABILITY: SOCIAL INSECURITY: HOW OFTEN DOES ANYONE, INCLUDING FAMILY AND FRIENDS, SCREAM OR CURSE AT YOU?: NEVER

## 2025-04-26 ASSESSMENT — PAIN SCALES - GENERAL
PAINLEVEL_OUTOF10: 5
PAINLEVEL_OUTOF10: 6
PAINLEVEL_OUTOF10: 3
PAINLEVEL_OUTOF10: 8
PAINLEVEL_OUTOF10: 9
PAINLEVEL_OUTOF10: 4

## 2025-04-26 ASSESSMENT — PAIN SCALES - WONG BAKER: WONGBAKER_NUMERICALRESPONSE: 8

## 2025-04-28 ENCOUNTER — TELEPHONE (OUTPATIENT)
Dept: CARE COORDINATION | Age: 65
End: 2025-04-28

## 2025-04-28 LAB — LAMOTRIGINE SERPL-MCNC: 11.5 UG/ML (ref 3–15)

## 2025-05-12 ENCOUNTER — TELEPHONE (OUTPATIENT)
Dept: CARE COORDINATION | Age: 65
End: 2025-05-12

## 2025-05-13 ENCOUNTER — TELEPHONE (OUTPATIENT)
Dept: CARE COORDINATION | Age: 65
End: 2025-05-13

## 2025-05-19 ENCOUNTER — TELEPHONE (OUTPATIENT)
Dept: CARE COORDINATION | Age: 65
End: 2025-05-19

## 2025-05-22 ENCOUNTER — TELEPHONE (OUTPATIENT)
Dept: SURGERY | Facility: CLINIC | Age: 65
End: 2025-05-22

## 2025-05-22 NOTE — TELEPHONE ENCOUNTER
Pt called need refill sent to [facility] pt stated she will be taking her last dosage 5/23/25,nk

## 2025-05-27 ENCOUNTER — TELEPHONE (OUTPATIENT)
Dept: CARE COORDINATION | Age: 65
End: 2025-05-27

## 2025-05-30 ENCOUNTER — APPOINTMENT (OUTPATIENT)
Dept: GENERAL RADIOLOGY | Age: 65
End: 2025-05-30
Attending: EMERGENCY MEDICINE

## 2025-05-30 ENCOUNTER — APPOINTMENT (OUTPATIENT)
Dept: CT IMAGING | Age: 65
End: 2025-05-30
Attending: EMERGENCY MEDICINE

## 2025-05-30 ENCOUNTER — HOSPITAL ENCOUNTER (OUTPATIENT)
Age: 65
Setting detail: OBSERVATION
Discharge: HOME | End: 2025-06-01
Attending: EMERGENCY MEDICINE | Admitting: HOSPITALIST

## 2025-05-30 DIAGNOSIS — R42 DIZZINESS: Primary | ICD-10-CM

## 2025-05-30 LAB
ALBUMIN SERPL-MCNC: 3.2 G/DL (ref 3.4–5)
ALBUMIN/GLOB SERPL: 1 {RATIO} (ref 1–2.4)
ALP SERPL-CCNC: 75 UNITS/L (ref 45–117)
ALT SERPL-CCNC: 31 UNITS/L
ANION GAP SERPL CALC-SCNC: 7 MMOL/L (ref 7–19)
AST SERPL-CCNC: 38 UNITS/L
ATRIAL RATE (BPM): 62
BASOPHILS # BLD: 0 K/MCL (ref 0–0.3)
BASOPHILS NFR BLD: 0 %
BILIRUB SERPL-MCNC: 0.2 MG/DL (ref 0.2–1)
BUN SERPL-MCNC: 24 MG/DL (ref 6–20)
BUN/CREAT SERPL: 41 (ref 7–25)
CALCIUM SERPL-MCNC: 9 MG/DL (ref 8.4–10.2)
CHLORIDE SERPL-SCNC: 110 MMOL/L (ref 97–110)
CO2 SERPL-SCNC: 25 MMOL/L (ref 21–32)
CREAT SERPL-MCNC: 0.59 MG/DL (ref 0.51–0.95)
DEPRECATED RDW RBC: 53.1 FL (ref 39–50)
EGFRCR SERPLBLD CKD-EPI 2021: >90 ML/MIN/{1.73_M2}
EOSINOPHIL # BLD: 0.1 K/MCL (ref 0–0.5)
EOSINOPHIL NFR BLD: 3 %
ERYTHROCYTE [DISTWIDTH] IN BLOOD: 14.7 % (ref 11–15)
FASTING DURATION TIME PATIENT: ABNORMAL H
FLUAV RNA RESP QL NAA+PROBE: NOT DETECTED
FLUBV RNA RESP QL NAA+PROBE: NOT DETECTED
GLOBULIN SER-MCNC: 3.2 G/DL (ref 2–4)
GLUCOSE SERPL-MCNC: 99 MG/DL (ref 70–99)
HCT VFR BLD CALC: 37.5 % (ref 36–46.5)
HGB BLD-MCNC: 11.8 G/DL (ref 12–15.5)
IMM GRANULOCYTES # BLD AUTO: 0 K/MCL (ref 0–0.2)
IMM GRANULOCYTES # BLD: 0 %
LYMPHOCYTES # BLD: 1.3 K/MCL (ref 1–4)
LYMPHOCYTES NFR BLD: 30 %
MAGNESIUM SERPL-MCNC: 2.3 MG/DL (ref 1.7–2.4)
MCH RBC QN AUTO: 30.6 PG (ref 26–34)
MCHC RBC AUTO-ENTMCNC: 31.5 G/DL (ref 32–36.5)
MCV RBC AUTO: 97.2 FL (ref 78–100)
MONOCYTES # BLD: 0.5 K/MCL (ref 0.3–0.9)
MONOCYTES NFR BLD: 12 %
NEUTROPHILS # BLD: 2.3 K/MCL (ref 1.8–7.7)
NEUTROPHILS NFR BLD: 55 %
NRBC BLD MANUAL-RTO: 0 /100 WBC
P AXIS (DEGREES): 48
PLATELET # BLD AUTO: 155 K/MCL (ref 140–450)
POTASSIUM SERPL-SCNC: 5 MMOL/L (ref 3.4–5.1)
PR-INTERVAL (MSEC): 206
PROT SERPL-MCNC: 6.4 G/DL (ref 6.4–8.2)
QRS-INTERVAL (MSEC): 82
QT-INTERVAL (MSEC): 374
QTC: 380
R AXIS (DEGREES): 28
RBC # BLD: 3.86 MIL/MCL (ref 4–5.2)
REPORT TEXT: NORMAL
RSV AG NPH QL IA.RAPID: NOT DETECTED
SARS-COV-2 RNA RESP QL NAA+PROBE: NOT DETECTED
SERVICE CMNT-IMP: NORMAL
SERVICE CMNT-IMP: NORMAL
SODIUM SERPL-SCNC: 137 MMOL/L (ref 135–145)
T AXIS (DEGREES): 57
TROPONIN I SERPL DL<=0.01 NG/ML-MCNC: 9 NG/L
VENTRICULAR RATE EKG/MIN (BPM): 62
WBC # BLD: 4.3 K/MCL (ref 4.2–11)

## 2025-05-30 PROCEDURE — 70450 CT HEAD/BRAIN W/O DYE: CPT

## 2025-05-30 PROCEDURE — 10004180 HB COUNTER-TRANSPORT

## 2025-05-30 PROCEDURE — 72128 CT CHEST SPINE W/O DYE: CPT

## 2025-05-30 PROCEDURE — G0378 HOSPITAL OBSERVATION PER HR: HCPCS

## 2025-05-30 PROCEDURE — 10002803 HB RX 637: Performed by: HOSPITALIST

## 2025-05-30 PROCEDURE — 93005 ELECTROCARDIOGRAM TRACING: CPT | Performed by: EMERGENCY MEDICINE

## 2025-05-30 PROCEDURE — 93010 ELECTROCARDIOGRAM REPORT: CPT | Performed by: INTERNAL MEDICINE

## 2025-05-30 PROCEDURE — 99285 EMERGENCY DEPT VISIT HI MDM: CPT | Performed by: EMERGENCY MEDICINE

## 2025-05-30 PROCEDURE — 83735 ASSAY OF MAGNESIUM: CPT | Performed by: EMERGENCY MEDICINE

## 2025-05-30 PROCEDURE — 96372 THER/PROPH/DIAG INJ SC/IM: CPT | Performed by: HOSPITALIST

## 2025-05-30 PROCEDURE — 10004651 HB RX, NO CHARGE ITEM: Performed by: HOSPITALIST

## 2025-05-30 PROCEDURE — 72170 X-RAY EXAM OF PELVIS: CPT

## 2025-05-30 PROCEDURE — 72125 CT NECK SPINE W/O DYE: CPT

## 2025-05-30 PROCEDURE — 10002800 HB RX 250 W HCPCS: Performed by: HOSPITALIST

## 2025-05-30 PROCEDURE — 0241U COVID/FLU/RSV PANEL: CPT | Performed by: EMERGENCY MEDICINE

## 2025-05-30 PROCEDURE — 10002803 HB RX 637: Performed by: EMERGENCY MEDICINE

## 2025-05-30 PROCEDURE — 85025 COMPLETE CBC W/AUTO DIFF WBC: CPT | Performed by: EMERGENCY MEDICINE

## 2025-05-30 PROCEDURE — 80053 COMPREHEN METABOLIC PANEL: CPT | Performed by: EMERGENCY MEDICINE

## 2025-05-30 PROCEDURE — 73080 X-RAY EXAM OF ELBOW: CPT

## 2025-05-30 PROCEDURE — 10002803 HB RX 637

## 2025-05-30 PROCEDURE — 72131 CT LUMBAR SPINE W/O DYE: CPT

## 2025-05-30 PROCEDURE — 84484 ASSAY OF TROPONIN QUANT: CPT | Performed by: EMERGENCY MEDICINE

## 2025-05-30 RX ORDER — ONDANSETRON 2 MG/ML
4 INJECTION INTRAMUSCULAR; INTRAVENOUS EVERY 12 HOURS PRN
Status: DISCONTINUED | OUTPATIENT
Start: 2025-05-30 | End: 2025-06-01 | Stop reason: HOSPADM

## 2025-05-30 RX ORDER — ACETAMINOPHEN 325 MG/1
650 TABLET ORAL EVERY 4 HOURS PRN
Status: DISCONTINUED | OUTPATIENT
Start: 2025-05-30 | End: 2025-06-01 | Stop reason: HOSPADM

## 2025-05-30 RX ORDER — COLCHICINE 0.6 MG/1
0.6 TABLET ORAL 2 TIMES DAILY
Status: DISCONTINUED | OUTPATIENT
Start: 2025-05-30 | End: 2025-06-01 | Stop reason: HOSPADM

## 2025-05-30 RX ORDER — ENOXAPARIN SODIUM 100 MG/ML
40 INJECTION SUBCUTANEOUS NIGHTLY
Status: DISCONTINUED | OUTPATIENT
Start: 2025-05-30 | End: 2025-06-01 | Stop reason: HOSPADM

## 2025-05-30 RX ORDER — PANTOPRAZOLE SODIUM 40 MG/1
40 TABLET, DELAYED RELEASE ORAL
Status: DISCONTINUED | OUTPATIENT
Start: 2025-05-31 | End: 2025-06-01 | Stop reason: HOSPADM

## 2025-05-30 RX ORDER — DULOXETIN HYDROCHLORIDE 30 MG/1
30 CAPSULE, DELAYED RELEASE ORAL DAILY
Status: DISCONTINUED | OUTPATIENT
Start: 2025-05-31 | End: 2025-06-01 | Stop reason: HOSPADM

## 2025-05-30 RX ORDER — ASPIRIN 81 MG/1
81 TABLET ORAL DAILY
Status: DISCONTINUED | OUTPATIENT
Start: 2025-05-31 | End: 2025-06-01 | Stop reason: HOSPADM

## 2025-05-30 RX ORDER — LAMOTRIGINE 100 MG/1
200 TABLET ORAL ONCE
Status: COMPLETED | OUTPATIENT
Start: 2025-05-30 | End: 2025-05-30

## 2025-05-30 RX ORDER — HYDROCODONE BITARTRATE AND ACETAMINOPHEN 10; 325 MG/1; MG/1
1 TABLET ORAL ONCE
Refills: 0 | Status: COMPLETED | OUTPATIENT
Start: 2025-05-30 | End: 2025-05-30

## 2025-05-30 RX ORDER — AMITRIPTYLINE HYDROCHLORIDE 100 MG/1
50 TABLET ORAL NIGHTLY
Status: DISCONTINUED | OUTPATIENT
Start: 2025-05-30 | End: 2025-06-01 | Stop reason: HOSPADM

## 2025-05-30 RX ORDER — METOPROLOL SUCCINATE 25 MG/1
25 TABLET, EXTENDED RELEASE ORAL DAILY
Status: DISCONTINUED | OUTPATIENT
Start: 2025-05-31 | End: 2025-06-01 | Stop reason: HOSPADM

## 2025-05-30 RX ORDER — DULOXETIN HYDROCHLORIDE 60 MG/1
60 CAPSULE, DELAYED RELEASE ORAL DAILY
Status: DISCONTINUED | OUTPATIENT
Start: 2025-05-31 | End: 2025-06-01 | Stop reason: HOSPADM

## 2025-05-30 RX ORDER — FLUTICASONE PROPIONATE 50 MCG
2 SPRAY, SUSPENSION (ML) NASAL DAILY
COMMUNITY

## 2025-05-30 RX ORDER — CYCLOBENZAPRINE HCL 10 MG
5 TABLET ORAL 3 TIMES DAILY PRN
Status: DISCONTINUED | OUTPATIENT
Start: 2025-05-30 | End: 2025-05-31

## 2025-05-30 RX ORDER — CLONAZEPAM 1 MG/1
2 TABLET ORAL 2 TIMES DAILY PRN
Status: DISCONTINUED | OUTPATIENT
Start: 2025-05-30 | End: 2025-06-01 | Stop reason: HOSPADM

## 2025-05-30 RX ORDER — GABAPENTIN 300 MG/1
600 CAPSULE ORAL 2 TIMES DAILY
Status: DISCONTINUED | OUTPATIENT
Start: 2025-05-30 | End: 2025-06-01 | Stop reason: HOSPADM

## 2025-05-30 RX ORDER — ONDANSETRON 4 MG/1
4 TABLET, ORALLY DISINTEGRATING ORAL EVERY 12 HOURS PRN
Status: DISCONTINUED | OUTPATIENT
Start: 2025-05-30 | End: 2025-06-01 | Stop reason: HOSPADM

## 2025-05-30 RX ORDER — ROSUVASTATIN CALCIUM 20 MG/1
40 TABLET, COATED ORAL EVERY EVENING
Status: DISCONTINUED | OUTPATIENT
Start: 2025-05-30 | End: 2025-06-01 | Stop reason: HOSPADM

## 2025-05-30 RX ORDER — VALACYCLOVIR HYDROCHLORIDE 500 MG/1
500 TABLET, FILM COATED ORAL EVERY EVENING
Status: DISCONTINUED | OUTPATIENT
Start: 2025-05-30 | End: 2025-05-30

## 2025-05-30 RX ORDER — THIAMINE HCL 100 MG
1 TABLET ORAL 2 TIMES DAILY
COMMUNITY

## 2025-05-30 RX ORDER — ACETAMINOPHEN 650 MG/1
650 SUPPOSITORY RECTAL EVERY 4 HOURS PRN
Status: DISCONTINUED | OUTPATIENT
Start: 2025-05-30 | End: 2025-06-01 | Stop reason: HOSPADM

## 2025-05-30 RX ORDER — LAMOTRIGINE 100 MG/1
200 TABLET ORAL 3 TIMES DAILY
Status: DISCONTINUED | OUTPATIENT
Start: 2025-05-30 | End: 2025-06-01 | Stop reason: HOSPADM

## 2025-05-30 RX ORDER — MECLIZINE HYDROCHLORIDE 25 MG/1
25 TABLET ORAL 3 TIMES DAILY PRN
Status: DISCONTINUED | OUTPATIENT
Start: 2025-05-30 | End: 2025-06-01 | Stop reason: HOSPADM

## 2025-05-30 RX ORDER — DEXTROAMPHETAMINE SACCHARATE, AMPHETAMINE ASPARTATE, DEXTROAMPHETAMINE SULFATE AND AMPHETAMINE SULFATE 2.5; 2.5; 2.5; 2.5 MG/1; MG/1; MG/1; MG/1
20 TABLET ORAL 2 TIMES DAILY
Status: DISCONTINUED | OUTPATIENT
Start: 2025-05-30 | End: 2025-06-01 | Stop reason: HOSPADM

## 2025-05-30 RX ORDER — POLYETHYLENE GLYCOL 3350 17 G/17G
17 POWDER, FOR SOLUTION ORAL DAILY PRN
Status: DISCONTINUED | OUTPATIENT
Start: 2025-05-30 | End: 2025-06-01 | Stop reason: HOSPADM

## 2025-05-30 RX ORDER — OXYCODONE AND ACETAMINOPHEN 10; 325 MG/1; MG/1
1 TABLET ORAL EVERY 6 HOURS PRN
Status: DISCONTINUED | OUTPATIENT
Start: 2025-05-30 | End: 2025-06-01 | Stop reason: HOSPADM

## 2025-05-30 RX ORDER — 0.9 % SODIUM CHLORIDE 0.9 %
10 VIAL (ML) INJECTION PRN
Status: DISCONTINUED | OUTPATIENT
Start: 2025-05-30 | End: 2025-06-01 | Stop reason: HOSPADM

## 2025-05-30 RX ORDER — ATORVASTATIN CALCIUM 80 MG/1
80 TABLET, FILM COATED ORAL EVERY EVENING
Status: DISCONTINUED | OUTPATIENT
Start: 2025-05-30 | End: 2025-05-30

## 2025-05-30 RX ORDER — VALACYCLOVIR HYDROCHLORIDE 500 MG/1
500 TABLET, FILM COATED ORAL EVERY EVENING
Status: DISCONTINUED | OUTPATIENT
Start: 2025-05-31 | End: 2025-06-01 | Stop reason: HOSPADM

## 2025-05-30 RX ORDER — ONDANSETRON 4 MG/1
4 TABLET, ORALLY DISINTEGRATING ORAL ONCE
Status: COMPLETED | OUTPATIENT
Start: 2025-05-30 | End: 2025-05-30

## 2025-05-30 RX ORDER — DEXTROAMPHETAMINE SACCHARATE, AMPHETAMINE ASPARTATE, DEXTROAMPHETAMINE SULFATE AND AMPHETAMINE SULFATE 2.5; 2.5; 2.5; 2.5 MG/1; MG/1; MG/1; MG/1
20 TABLET ORAL ONCE
Refills: 0 | Status: DISCONTINUED | OUTPATIENT
Start: 2025-05-30 | End: 2025-05-30

## 2025-05-30 RX ORDER — 0.9 % SODIUM CHLORIDE 0.9 %
2 VIAL (ML) INJECTION EVERY 12 HOURS SCHEDULED
Status: DISCONTINUED | OUTPATIENT
Start: 2025-05-30 | End: 2025-06-01 | Stop reason: HOSPADM

## 2025-05-30 RX ADMIN — SODIUM CHLORIDE, PRESERVATIVE FREE 2 ML: 5 INJECTION INTRAVENOUS at 22:16

## 2025-05-30 RX ADMIN — COLCHICINE 0.6 MG: 0.6 TABLET ORAL at 22:14

## 2025-05-30 RX ADMIN — LAMOTRIGINE 200 MG: 100 TABLET ORAL at 22:14

## 2025-05-30 RX ADMIN — AMITRIPTYLINE HYDROCHLORIDE 50 MG: 25 TABLET, FILM COATED ORAL at 22:13

## 2025-05-30 RX ADMIN — ENOXAPARIN SODIUM 40 MG: 100 INJECTION SUBCUTANEOUS at 22:15

## 2025-05-30 RX ADMIN — OXYCODONE AND ACETAMINOPHEN 1 TABLET: 10; 325 TABLET ORAL at 17:05

## 2025-05-30 RX ADMIN — ONDANSETRON 4 MG: 4 TABLET, ORALLY DISINTEGRATING ORAL at 09:28

## 2025-05-30 RX ADMIN — ROSUVASTATIN CALCIUM 40 MG: 20 TABLET, FILM COATED ORAL at 22:21

## 2025-05-30 RX ADMIN — LAMOTRIGINE 200 MG: 100 TABLET ORAL at 14:59

## 2025-05-30 RX ADMIN — GABAPENTIN 600 MG: 300 CAPSULE ORAL at 22:13

## 2025-05-30 RX ADMIN — ACETAMINOPHEN 650 MG: 325 TABLET ORAL at 18:24

## 2025-05-30 RX ADMIN — HYDROCODONE BITARTRATE AND ACETAMINOPHEN 1 TABLET: 10; 325 TABLET ORAL at 09:28

## 2025-05-30 SDOH — ECONOMIC STABILITY: FOOD INSECURITY: WITHIN THE PAST 12 MONTHS, THE FOOD YOU BOUGHT JUST DIDN'T LAST AND YOU DIDN'T HAVE MONEY TO GET MORE.: NEVER TRUE

## 2025-05-30 SDOH — ECONOMIC STABILITY: HOUSING INSECURITY: WHAT IS YOUR LIVING SITUATION TODAY?: SPOUSE;CHILDREN;FAMILY MEMBERS

## 2025-05-30 SDOH — ECONOMIC STABILITY: HOUSING INSECURITY: DO YOU HAVE PROBLEMS WITH ANY OF THE FOLLOWING?: NONE OF THE ABOVE

## 2025-05-30 SDOH — SOCIAL STABILITY: SOCIAL INSECURITY: HOW OFTEN DOES ANYONE, INCLUDING FAMILY AND FRIENDS, THREATEN YOU WITH HARM?: NEVER

## 2025-05-30 SDOH — HEALTH STABILITY: PHYSICAL HEALTH: DO YOU HAVE SERIOUS DIFFICULTY WALKING OR CLIMBING STAIRS?: NO

## 2025-05-30 SDOH — SOCIAL STABILITY: SOCIAL INSECURITY: HOW OFTEN DOES ANYONE, INCLUDING FAMILY AND FRIENDS, INSULT OR TALK DOWN TO YOU?: NEVER

## 2025-05-30 SDOH — ECONOMIC STABILITY: HOUSING INSECURITY: WHAT IS YOUR LIVING SITUATION TODAY?: HOUSE

## 2025-05-30 SDOH — HEALTH STABILITY: GENERAL: BECAUSE OF A PHYSICAL, MENTAL, OR EMOTIONAL CONDITION, DO YOU HAVE DIFFICULTY DOING ERRANDS ALONE?: NO

## 2025-05-30 SDOH — HEALTH STABILITY: GENERAL
BECAUSE OF A PHYSICAL, MENTAL, OR EMOTIONAL CONDITION, DO YOU HAVE SERIOUS DIFFICULTY CONCENTRATING, REMEMBERING OR MAKING DECISIONS?: YES

## 2025-05-30 SDOH — ECONOMIC STABILITY: INCOME INSECURITY: IN THE PAST 12 MONTHS, HAS THE ELECTRIC, GAS, OIL, OR WATER COMPANY THREATENED TO SHUT OFF SERVICE IN YOUR HOME?: NO

## 2025-05-30 SDOH — ECONOMIC STABILITY: HOUSING INSECURITY: WHAT IS YOUR LIVING SITUATION TODAY?: I HAVE A STEADY PLACE TO LIVE

## 2025-05-30 SDOH — SOCIAL STABILITY: SOCIAL NETWORK: SUPPORT SYSTEMS: SPOUSE;CHILDREN;FRIENDS

## 2025-05-30 SDOH — ECONOMIC STABILITY: GENERAL

## 2025-05-30 SDOH — SOCIAL STABILITY: SOCIAL INSECURITY: HOW OFTEN DOES ANYONE, INCLUDING FAMILY AND FRIENDS, SCREAM OR CURSE AT YOU?: NEVER

## 2025-05-30 SDOH — HEALTH STABILITY: PHYSICAL HEALTH: DO YOU HAVE DIFFICULTY DRESSING OR BATHING?: NO

## 2025-05-30 SDOH — SOCIAL STABILITY: SOCIAL INSECURITY: HOW OFTEN DOES ANYONE, INCLUDING FAMILY AND FRIENDS, PHYSICALLY HURT YOU?: NEVER

## 2025-05-30 ASSESSMENT — ORIENTATION MEMORY CONCENTRATION TEST (OMCT)
OMCT INTERPRETATION: 0-6: NO SIGNIFICANT IMPAIRMENT
WHAT MONTH IS IT NOW: CORRECT
OMCT SCORE: 2
COUNT BACKWARDS FROM 20 TO 1: CORRECT
WHAT TIME IS IT (NO WATCH OR CLOCK): CORRECT
SAY THE MONTHS IN REVERSE ORDER STARTING WITH LAST MONTH: 1 ERROR
REPEAT THE NAME AND ADDRESS I ASKED YOU TO REMEMBER: CORRECT
WHAT YEAR IS IT NOW (MUST BE EXACT): CORRECT

## 2025-05-30 ASSESSMENT — PAIN SCALES - GENERAL
PAINLEVEL_OUTOF10: 0
PAINLEVEL_OUTOF10: 8
PAINLEVEL_OUTOF10: 0
PAINLEVEL_OUTOF10: 7
PAINLEVEL_OUTOF10: 7
PAINLEVEL_OUTOF10: 0
PAINLEVEL_OUTOF10: 8
PAINLEVEL_OUTOF10: 0
PAINLEVEL_OUTOF10: 0
PAINLEVEL_OUTOF10: 8

## 2025-05-30 ASSESSMENT — ACTIVITIES OF DAILY LIVING (ADL)
ADL_SHORT_OF_BREATH: NO
RECENT_DECLINE_ADL: NO
ADL_SCORE: 12
ADL_BEFORE_ADMISSION: INDEPENDENT

## 2025-05-30 ASSESSMENT — PATIENT HEALTH QUESTIONNAIRE - PHQ9
1. LITTLE INTEREST OR PLEASURE IN DOING THINGS: NOT AT ALL
SUM OF ALL RESPONSES TO PHQ9 QUESTIONS 1 AND 2: 0
CLINICAL INTERPRETATION OF PHQ2 SCORE: NO FURTHER SCREENING NEEDED
SUM OF ALL RESPONSES TO PHQ9 QUESTIONS 1 AND 2: 0
2. FEELING DOWN, DEPRESSED OR HOPELESS: NOT AT ALL
IS PATIENT ABLE TO COMPLETE PHQ2 OR PHQ9: YES

## 2025-05-30 ASSESSMENT — LIFESTYLE VARIABLES
HOW OFTEN DO YOU HAVE 6 OR MORE DRINKS ON ONE OCCASION: NEVER
AUDIT-C TOTAL SCORE: 0
HOW OFTEN DO YOU HAVE A DRINK CONTAINING ALCOHOL: NEVER
ALCOHOL_USE_STATUS: NO OR LOW RISK WITH VALIDATED TOOL
HOW MANY STANDARD DRINKS CONTAINING ALCOHOL DO YOU HAVE ON A TYPICAL DAY: 0,1 OR 2

## 2025-05-30 ASSESSMENT — COLUMBIA-SUICIDE SEVERITY RATING SCALE - C-SSRS
2. HAVE YOU ACTUALLY HAD ANY THOUGHTS OF KILLING YOURSELF?: NO
1. WITHIN THE PAST MONTH, HAVE YOU WISHED YOU WERE DEAD OR WISHED YOU COULD GO TO SLEEP AND NOT WAKE UP?: NO
IS THE PATIENT ABLE TO COMPLETE C-SSRS: YES
6. HAVE YOU EVER DONE ANYTHING, STARTED TO DO ANYTHING, OR PREPARED TO DO ANYTHING TO END YOUR LIFE?: NO

## 2025-05-30 ASSESSMENT — MOVEMENT AND STRENGTH ASSESSMENTS: ALL_EXTREMITIES: EQUAL STRENGTH/TONE/MOVEMENT

## 2025-05-31 ENCOUNTER — APPOINTMENT (OUTPATIENT)
Dept: CT IMAGING | Age: 65
End: 2025-05-31
Attending: HOSPITALIST

## 2025-05-31 ENCOUNTER — APPOINTMENT (OUTPATIENT)
Dept: MRI IMAGING | Age: 65
End: 2025-05-31
Attending: PSYCHIATRY & NEUROLOGY

## 2025-05-31 LAB
APPEARANCE UR: CLEAR
BACTERIA #/AREA URNS HPF: ABNORMAL /HPF
BILIRUB UR QL STRIP: NEGATIVE
COLOR UR: ABNORMAL
GLUCOSE UR STRIP-MCNC: NEGATIVE MG/DL
HGB UR QL STRIP: NEGATIVE
HYALINE CASTS #/AREA URNS LPF: ABNORMAL /LPF
KETONES UR STRIP-MCNC: NEGATIVE MG/DL
LEUKOCYTE ESTERASE UR QL STRIP: ABNORMAL
NITRITE UR QL STRIP: NEGATIVE
PH UR STRIP: 5.5 [PH] (ref 5–7)
PROT UR STRIP-MCNC: NEGATIVE MG/DL
RBC #/AREA URNS HPF: ABNORMAL /HPF
SP GR UR STRIP: 1.02 (ref 1–1.03)
SQUAMOUS #/AREA URNS HPF: ABNORMAL /HPF
UROBILINOGEN UR STRIP-MCNC: 0.2 MG/DL
WBC #/AREA URNS HPF: ABNORMAL /HPF

## 2025-05-31 PROCEDURE — 70553 MRI BRAIN STEM W/O & W/DYE: CPT

## 2025-05-31 PROCEDURE — A9585 GADOBUTROL INJECTION: HCPCS | Performed by: PSYCHIATRY & NEUROLOGY

## 2025-05-31 PROCEDURE — G0378 HOSPITAL OBSERVATION PER HR: HCPCS

## 2025-05-31 PROCEDURE — 96372 THER/PROPH/DIAG INJ SC/IM: CPT | Performed by: HOSPITALIST

## 2025-05-31 PROCEDURE — 70498 CT ANGIOGRAPHY NECK: CPT

## 2025-05-31 PROCEDURE — 10002805 HB CONTRAST AGENT: Performed by: HOSPITALIST

## 2025-05-31 PROCEDURE — 97166 OT EVAL MOD COMPLEX 45 MIN: CPT

## 2025-05-31 PROCEDURE — 97535 SELF CARE MNGMENT TRAINING: CPT

## 2025-05-31 PROCEDURE — 10002803 HB RX 637: Performed by: HOSPITALIST

## 2025-05-31 PROCEDURE — 10004651 HB RX, NO CHARGE ITEM: Performed by: HOSPITALIST

## 2025-05-31 PROCEDURE — 10004180 HB COUNTER-TRANSPORT

## 2025-05-31 PROCEDURE — 10002805 HB CONTRAST AGENT: Performed by: PSYCHIATRY & NEUROLOGY

## 2025-05-31 PROCEDURE — 97530 THERAPEUTIC ACTIVITIES: CPT

## 2025-05-31 PROCEDURE — 81001 URINALYSIS AUTO W/SCOPE: CPT | Performed by: HOSPITALIST

## 2025-05-31 PROCEDURE — 87086 URINE CULTURE/COLONY COUNT: CPT | Performed by: HOSPITALIST

## 2025-05-31 PROCEDURE — 10002800 HB RX 250 W HCPCS: Performed by: HOSPITALIST

## 2025-05-31 PROCEDURE — 10002803 HB RX 637

## 2025-05-31 RX ORDER — CEFUROXIME AXETIL 250 MG/1
250 TABLET ORAL EVERY 12 HOURS SCHEDULED
Status: DISCONTINUED | OUTPATIENT
Start: 2025-05-31 | End: 2025-06-01 | Stop reason: HOSPADM

## 2025-05-31 RX ORDER — POLYETHYLENE GLYCOL 3350 17 G/17G
17 POWDER, FOR SOLUTION ORAL DAILY
Status: DISCONTINUED | OUTPATIENT
Start: 2025-06-01 | End: 2025-06-01 | Stop reason: HOSPADM

## 2025-05-31 RX ORDER — BUTALBITAL, ACETAMINOPHEN AND CAFFEINE 50; 325; 40 MG/1; MG/1; MG/1
1 TABLET ORAL EVERY 4 HOURS PRN
Status: DISCONTINUED | OUTPATIENT
Start: 2025-05-31 | End: 2025-06-01 | Stop reason: HOSPADM

## 2025-05-31 RX ORDER — DOCUSATE SODIUM 100 MG/1
100 CAPSULE, LIQUID FILLED ORAL DAILY
Status: DISCONTINUED | OUTPATIENT
Start: 2025-06-01 | End: 2025-06-01 | Stop reason: HOSPADM

## 2025-05-31 RX ORDER — GADOBUTROL 604.72 MG/ML
7.5 INJECTION INTRAVENOUS ONCE
Status: COMPLETED | OUTPATIENT
Start: 2025-05-31 | End: 2025-05-31

## 2025-05-31 RX ADMIN — OXYCODONE AND ACETAMINOPHEN 1 TABLET: 10; 325 TABLET ORAL at 09:26

## 2025-05-31 RX ADMIN — LAMOTRIGINE 200 MG: 100 TABLET ORAL at 21:57

## 2025-05-31 RX ADMIN — ONDANSETRON 4 MG: 4 TABLET, ORALLY DISINTEGRATING ORAL at 22:55

## 2025-05-31 RX ADMIN — DEXTROAMPHETAMINE SACCHARATE, AMPHETAMINE ASPARTATE, DEXTROAMPHETAMINE SULFATE AND AMPHETAMINE SULFATE 20 MG: 2.5; 2.5; 2.5; 2.5 TABLET ORAL at 12:51

## 2025-05-31 RX ADMIN — COLCHICINE 0.6 MG: 0.6 TABLET ORAL at 21:57

## 2025-05-31 RX ADMIN — ROSUVASTATIN CALCIUM 40 MG: 20 TABLET, FILM COATED ORAL at 18:34

## 2025-05-31 RX ADMIN — VALACYCLOVIR HYDROCHLORIDE 500 MG: 500 TABLET, FILM COATED ORAL at 18:35

## 2025-05-31 RX ADMIN — ASPIRIN 81 MG: 81 TABLET, COATED ORAL at 11:32

## 2025-05-31 RX ADMIN — CLONAZEPAM 2 MG: 1 TABLET ORAL at 12:46

## 2025-05-31 RX ADMIN — METOPROLOL SUCCINATE 25 MG: 25 TABLET, EXTENDED RELEASE ORAL at 11:32

## 2025-05-31 RX ADMIN — ACETAMINOPHEN 650 MG: 325 TABLET ORAL at 22:09

## 2025-05-31 RX ADMIN — BACLOFEN 2.5 MG: 10 TABLET ORAL at 11:39

## 2025-05-31 RX ADMIN — GABAPENTIN 600 MG: 300 CAPSULE ORAL at 21:57

## 2025-05-31 RX ADMIN — CEFUROXIME AXETIL 250 MG: 250 TABLET, FILM COATED ORAL at 21:57

## 2025-05-31 RX ADMIN — SODIUM CHLORIDE, PRESERVATIVE FREE 2 ML: 5 INJECTION INTRAVENOUS at 09:29

## 2025-05-31 RX ADMIN — ACETAMINOPHEN 650 MG: 325 TABLET ORAL at 11:39

## 2025-05-31 RX ADMIN — GABAPENTIN 600 MG: 300 CAPSULE ORAL at 11:32

## 2025-05-31 RX ADMIN — LAMOTRIGINE 200 MG: 100 TABLET ORAL at 14:42

## 2025-05-31 RX ADMIN — IOHEXOL 80 ML: 350 INJECTION, SOLUTION INTRAVENOUS at 14:27

## 2025-05-31 RX ADMIN — COLCHICINE 0.6 MG: 0.6 TABLET ORAL at 11:32

## 2025-05-31 RX ADMIN — PANTOPRAZOLE SODIUM 40 MG: 40 TABLET, DELAYED RELEASE ORAL at 09:26

## 2025-05-31 RX ADMIN — DEXTROAMPHETAMINE SACCHARATE, AMPHETAMINE ASPARTATE, DEXTROAMPHETAMINE SULFATE AND AMPHETAMINE SULFATE 20 MG: 2.5; 2.5; 2.5; 2.5 TABLET ORAL at 09:26

## 2025-05-31 RX ADMIN — SODIUM CHLORIDE, PRESERVATIVE FREE 2 ML: 5 INJECTION INTRAVENOUS at 21:59

## 2025-05-31 RX ADMIN — AMITRIPTYLINE HYDROCHLORIDE 50 MG: 25 TABLET, FILM COATED ORAL at 22:08

## 2025-05-31 RX ADMIN — GADOBUTROL 7.5 ML: 604.72 INJECTION INTRAVENOUS at 13:52

## 2025-05-31 RX ADMIN — DULOXETINE 60 MG: 60 CAPSULE, DELAYED RELEASE ORAL at 11:32

## 2025-05-31 RX ADMIN — ACETAMINOPHEN 650 MG: 325 TABLET ORAL at 16:51

## 2025-05-31 RX ADMIN — OXYCODONE AND ACETAMINOPHEN 1 TABLET: 10; 325 TABLET ORAL at 16:51

## 2025-05-31 RX ADMIN — LAMOTRIGINE 200 MG: 100 TABLET ORAL at 09:26

## 2025-05-31 RX ADMIN — OXYCODONE AND ACETAMINOPHEN 1 TABLET: 10; 325 TABLET ORAL at 22:53

## 2025-05-31 RX ADMIN — ENOXAPARIN SODIUM 40 MG: 100 INJECTION SUBCUTANEOUS at 21:58

## 2025-05-31 RX ADMIN — DULOXETINE 30 MG: 30 CAPSULE, DELAYED RELEASE ORAL at 11:31

## 2025-05-31 ASSESSMENT — ACTIVITIES OF DAILY LIVING (ADL)
GROOMING: MODIFIED INDEPENDENT
PRIOR_ADL_TOILETING: MODIFIED INDEPENDENT
PRIOR_ADL: MODIFIED INDEPENDENT
PRIOR_ADL_BATHING: SUPERVISION (SUPV)
HOME_MANAGEMENT_TIME_ENTRY: 15

## 2025-05-31 ASSESSMENT — COGNITIVE AND FUNCTIONAL STATUS - GENERAL
DAILY_ACTIVITY_RAW_SCORE: 18
HELP NEEDED DRESSING REGULAR LOWER BODY CLOTHING: A LITTLE
HELP NEEDED FOR PERSONAL GROOMING: A LITTLE
HELP NEEDED FOR BATHING: A LITTLE
DAILY_ACTIVITY_CONVERTED_SCORE: 38.66
HELP NEEDED DRESSING REGULAR UPPER BODY CLOTHING: A LITTLE
HELP NEEDED FOR TOILETING: A LITTLE

## 2025-05-31 ASSESSMENT — PAIN SCALES - GENERAL
PAINLEVEL_OUTOF10: 6
PAINLEVEL_OUTOF10: 8
PAINLEVEL_OUTOF10: 5
PAINLEVEL_OUTOF10: 6
PAINLEVEL_OUTOF10: 5
PAINLEVEL_OUTOF10: 9
PAINLEVEL_OUTOF10: 6
PAINLEVEL_OUTOF10: 6
PAINLEVEL_OUTOF10: 4
PAINLEVEL_OUTOF10: 6

## 2025-05-31 ASSESSMENT — ENCOUNTER SYMPTOMS: PAIN SEVERITY NOW: 8

## 2025-06-01 VITALS
TEMPERATURE: 97.7 F | SYSTOLIC BLOOD PRESSURE: 121 MMHG | OXYGEN SATURATION: 97 % | BODY MASS INDEX: 28.31 KG/M2 | WEIGHT: 144.18 LBS | HEIGHT: 60 IN | HEART RATE: 82 BPM | DIASTOLIC BLOOD PRESSURE: 72 MMHG | RESPIRATION RATE: 16 BRPM

## 2025-06-01 LAB — BACTERIA UR CULT: NORMAL

## 2025-06-01 PROCEDURE — 97530 THERAPEUTIC ACTIVITIES: CPT

## 2025-06-01 PROCEDURE — 96375 TX/PRO/DX INJ NEW DRUG ADDON: CPT

## 2025-06-01 PROCEDURE — 10002800 HB RX 250 W HCPCS: Performed by: PSYCHIATRY & NEUROLOGY

## 2025-06-01 PROCEDURE — 10004651 HB RX, NO CHARGE ITEM: Performed by: HOSPITALIST

## 2025-06-01 PROCEDURE — 97162 PT EVAL MOD COMPLEX 30 MIN: CPT

## 2025-06-01 PROCEDURE — 10002803 HB RX 637: Performed by: HOSPITALIST

## 2025-06-01 PROCEDURE — 96374 THER/PROPH/DIAG INJ IV PUSH: CPT

## 2025-06-01 PROCEDURE — 10002803 HB RX 637: Performed by: PSYCHIATRY & NEUROLOGY

## 2025-06-01 PROCEDURE — G0378 HOSPITAL OBSERVATION PER HR: HCPCS

## 2025-06-01 RX ORDER — METOCLOPRAMIDE HYDROCHLORIDE 5 MG/ML
10 INJECTION INTRAMUSCULAR; INTRAVENOUS ONCE
Status: COMPLETED | OUTPATIENT
Start: 2025-06-01 | End: 2025-06-01

## 2025-06-01 RX ORDER — METHYLPREDNISOLONE 4 MG/1
4 TABLET ORAL
Status: DISCONTINUED | OUTPATIENT
Start: 2025-06-03 | End: 2025-06-01 | Stop reason: HOSPADM

## 2025-06-01 RX ORDER — KETOROLAC TROMETHAMINE 15 MG/ML
15 INJECTION, SOLUTION INTRAMUSCULAR; INTRAVENOUS ONCE
Status: COMPLETED | OUTPATIENT
Start: 2025-06-01 | End: 2025-06-01

## 2025-06-01 RX ORDER — METHYLPREDNISOLONE 8 MG/1
24 TABLET ORAL ONCE
Status: COMPLETED | OUTPATIENT
Start: 2025-06-01 | End: 2025-06-01

## 2025-06-01 RX ORDER — METHYLPREDNISOLONE 4 MG/1
4 TABLET ORAL
Status: DISCONTINUED | OUTPATIENT
Start: 2025-06-06 | End: 2025-06-01 | Stop reason: HOSPADM

## 2025-06-01 RX ORDER — METHYLPREDNISOLONE 4 MG/1
4 TABLET ORAL
Status: DISCONTINUED | OUTPATIENT
Start: 2025-06-02 | End: 2025-06-01 | Stop reason: HOSPADM

## 2025-06-01 RX ORDER — CEFUROXIME AXETIL 250 MG/1
250 TABLET ORAL EVERY 12 HOURS SCHEDULED
Qty: 12 TABLET | Refills: 0 | Status: SHIPPED | OUTPATIENT
Start: 2025-06-01 | End: 2025-06-07

## 2025-06-01 RX ORDER — METHYLPREDNISOLONE 4 MG/1
4 TABLET ORAL
Status: DISCONTINUED | OUTPATIENT
Start: 2025-06-04 | End: 2025-06-01 | Stop reason: HOSPADM

## 2025-06-01 RX ORDER — METHYLPREDNISOLONE 4 MG/1
TABLET ORAL
Qty: 21 TABLET | Refills: 0 | Status: SHIPPED | OUTPATIENT
Start: 2025-06-01

## 2025-06-01 RX ORDER — DIPHENHYDRAMINE HYDROCHLORIDE 50 MG/ML
25 INJECTION, SOLUTION INTRAMUSCULAR; INTRAVENOUS ONCE
Status: COMPLETED | OUTPATIENT
Start: 2025-06-01 | End: 2025-06-01

## 2025-06-01 RX ORDER — METHYLPREDNISOLONE 4 MG/1
4 TABLET ORAL 2 TIMES DAILY WITH MEALS
Status: DISCONTINUED | OUTPATIENT
Start: 2025-06-05 | End: 2025-06-01 | Stop reason: HOSPADM

## 2025-06-01 RX ADMIN — METOPROLOL SUCCINATE 25 MG: 25 TABLET, EXTENDED RELEASE ORAL at 08:14

## 2025-06-01 RX ADMIN — METHYLPREDNISOLONE 24 MG: 8 TABLET ORAL at 10:05

## 2025-06-01 RX ADMIN — GABAPENTIN 600 MG: 300 CAPSULE ORAL at 08:13

## 2025-06-01 RX ADMIN — ACETAMINOPHEN 650 MG: 325 TABLET ORAL at 14:32

## 2025-06-01 RX ADMIN — COLCHICINE 0.6 MG: 0.6 TABLET ORAL at 08:13

## 2025-06-01 RX ADMIN — DULOXETINE 60 MG: 60 CAPSULE, DELAYED RELEASE ORAL at 08:13

## 2025-06-01 RX ADMIN — DULOXETINE 30 MG: 30 CAPSULE, DELAYED RELEASE ORAL at 08:13

## 2025-06-01 RX ADMIN — KETOROLAC TROMETHAMINE 15 MG: 15 INJECTION, SOLUTION INTRAMUSCULAR; INTRAVENOUS at 08:11

## 2025-06-01 RX ADMIN — DEXTROAMPHETAMINE SACCHARATE, AMPHETAMINE ASPARTATE, DEXTROAMPHETAMINE SULFATE AND AMPHETAMINE SULFATE 20 MG: 2.5; 2.5; 2.5; 2.5 TABLET ORAL at 06:09

## 2025-06-01 RX ADMIN — POLYETHYLENE GLYCOL 3350 17 G: 17 POWDER, FOR SOLUTION ORAL at 08:14

## 2025-06-01 RX ADMIN — ASPIRIN 81 MG: 81 TABLET, COATED ORAL at 08:13

## 2025-06-01 RX ADMIN — LAMOTRIGINE 200 MG: 100 TABLET ORAL at 08:13

## 2025-06-01 RX ADMIN — DEXTROAMPHETAMINE SACCHARATE, AMPHETAMINE ASPARTATE, DEXTROAMPHETAMINE SULFATE AND AMPHETAMINE SULFATE 20 MG: 2.5; 2.5; 2.5; 2.5 TABLET ORAL at 11:06

## 2025-06-01 RX ADMIN — PANTOPRAZOLE SODIUM 40 MG: 40 TABLET, DELAYED RELEASE ORAL at 06:09

## 2025-06-01 RX ADMIN — CEFUROXIME AXETIL 250 MG: 250 TABLET, FILM COATED ORAL at 08:14

## 2025-06-01 RX ADMIN — OXYCODONE AND ACETAMINOPHEN 1 TABLET: 10; 325 TABLET ORAL at 13:04

## 2025-06-01 RX ADMIN — LAMOTRIGINE 200 MG: 100 TABLET ORAL at 13:04

## 2025-06-01 RX ADMIN — METOCLOPRAMIDE 10 MG: 5 INJECTION, SOLUTION INTRAMUSCULAR; INTRAVENOUS at 08:11

## 2025-06-01 RX ADMIN — BACLOFEN 2.5 MG: 10 TABLET ORAL at 15:12

## 2025-06-01 RX ADMIN — DOCUSATE SODIUM 100 MG: 100 CAPSULE, LIQUID FILLED ORAL at 08:13

## 2025-06-01 RX ADMIN — DIPHENHYDRAMINE HYDROCHLORIDE 25 MG: 50 INJECTION INTRAMUSCULAR; INTRAVENOUS at 08:11

## 2025-06-01 RX ADMIN — SODIUM CHLORIDE, PRESERVATIVE FREE 2 ML: 5 INJECTION INTRAVENOUS at 08:16

## 2025-06-01 SDOH — ECONOMIC STABILITY: HOUSING INSECURITY: DO YOU HAVE PROBLEMS WITH ANY OF THE FOLLOWING?: NONE OF THE ABOVE

## 2025-06-01 SDOH — ECONOMIC STABILITY: HOUSING INSECURITY: WHAT IS YOUR LIVING SITUATION TODAY?: I HAVE A STEADY PLACE TO LIVE

## 2025-06-01 ASSESSMENT — COGNITIVE AND FUNCTIONAL STATUS - GENERAL
BASIC_MOBILITY_RAW_SCORE: 20
BASIC_MOBILITY_CONVERTED_SCORE: 43.99

## 2025-06-01 ASSESSMENT — PAIN SCALES - GENERAL
PAINLEVEL_OUTOF10: 0
PAINLEVEL_OUTOF10: 5
PAINLEVEL_OUTOF10: 0
PAINLEVEL_OUTOF10: 0
PAINLEVEL_OUTOF10: 1
PAINLEVEL_OUTOF10: 0

## 2025-06-01 ASSESSMENT — PAIN DESCRIPTION - PAIN TYPE: TYPE: ACUTE PAIN

## 2025-06-03 ENCOUNTER — TELEPHONE (OUTPATIENT)
Dept: CARE COORDINATION | Age: 65
End: 2025-06-03

## 2025-06-04 ENCOUNTER — TELEPHONE (OUTPATIENT)
Dept: CARE COORDINATION | Age: 65
End: 2025-06-04

## 2025-06-09 RX ORDER — METOPROLOL SUCCINATE 25 MG/1
25 TABLET, EXTENDED RELEASE ORAL DAILY
Qty: 90 TABLET | Refills: 3 | Status: SHIPPED | OUTPATIENT
Start: 2025-06-09

## 2025-06-10 ENCOUNTER — TELEPHONE (OUTPATIENT)
Dept: CARE COORDINATION | Age: 65
End: 2025-06-10

## 2025-06-11 ENCOUNTER — TELEPHONE (OUTPATIENT)
Dept: CARE COORDINATION | Age: 65
End: 2025-06-11

## 2025-06-17 ENCOUNTER — APPOINTMENT (OUTPATIENT)
Dept: CARDIOLOGY | Age: 65
End: 2025-06-17

## 2025-06-18 ENCOUNTER — TELEPHONE (OUTPATIENT)
Dept: CARE COORDINATION | Age: 65
End: 2025-06-18

## 2025-07-01 ENCOUNTER — TELEPHONE (OUTPATIENT)
Dept: CARE COORDINATION | Age: 65
End: 2025-07-01

## 2025-07-10 PROBLEM — N76.0 ACUTE VAGINITIS: Status: ACTIVE | Noted: 2025-07-10

## 2025-07-30 ENCOUNTER — TELEMEDICINE (OUTPATIENT)
Dept: SURGERY | Facility: CLINIC | Age: 65
End: 2025-07-30
Payer: MEDICARE

## 2025-07-30 VITALS — BODY MASS INDEX: 29.45 KG/M2 | HEIGHT: 60 IN | WEIGHT: 150 LBS

## 2025-07-30 DIAGNOSIS — E66.9 OBESITY (BMI 30-39.9): ICD-10-CM

## 2025-07-30 DIAGNOSIS — I10 ESSENTIAL HYPERTENSION: Primary | ICD-10-CM

## 2025-07-30 DIAGNOSIS — E78.2 MIXED HYPERLIPIDEMIA: ICD-10-CM

## 2025-07-30 DIAGNOSIS — Z51.81 ENCOUNTER FOR THERAPEUTIC DRUG MONITORING: ICD-10-CM

## 2025-07-30 DIAGNOSIS — R63.2 BINGE EATING: ICD-10-CM

## 2025-07-30 PROCEDURE — 99214 OFFICE O/P EST MOD 30 MIN: CPT | Performed by: INTERNAL MEDICINE

## 2025-07-30 RX ORDER — TIRZEPATIDE 2.5 MG/.5ML
2.5 INJECTION, SOLUTION SUBCUTANEOUS WEEKLY
Qty: 2 ML | Refills: 5 | Status: SHIPPED | OUTPATIENT
Start: 2025-07-30

## 2025-08-01 PROBLEM — N76.0 RECURRENT VAGINITIS: Status: ACTIVE | Noted: 2025-08-01

## 2025-08-05 PROCEDURE — 99284 EMERGENCY DEPT VISIT MOD MDM: CPT

## 2025-08-05 PROCEDURE — 12001 RPR S/N/AX/GEN/TRNK 2.5CM/<: CPT

## 2025-08-05 PROCEDURE — 10002803 HB RX 637

## 2025-08-05 RX ORDER — ONDANSETRON 4 MG/1
TABLET, ORALLY DISINTEGRATING ORAL
Status: COMPLETED
Start: 2025-08-05 | End: 2025-08-05

## 2025-08-05 RX ADMIN — ONDANSETRON 4 MG: 4 TABLET, ORALLY DISINTEGRATING ORAL at 21:51

## 2025-08-05 ASSESSMENT — PAIN SCALES - GENERAL: PAINLEVEL_OUTOF10: 7

## 2025-08-06 ENCOUNTER — APPOINTMENT (OUTPATIENT)
Dept: CT IMAGING | Age: 65
End: 2025-08-06

## 2025-08-06 ENCOUNTER — HOSPITAL ENCOUNTER (EMERGENCY)
Age: 65
Discharge: HOME | End: 2025-08-06

## 2025-08-06 VITALS
WEIGHT: 159.61 LBS | RESPIRATION RATE: 17 BRPM | DIASTOLIC BLOOD PRESSURE: 68 MMHG | TEMPERATURE: 98.2 F | HEART RATE: 56 BPM | OXYGEN SATURATION: 96 % | BODY MASS INDEX: 31.17 KG/M2 | SYSTOLIC BLOOD PRESSURE: 109 MMHG

## 2025-08-06 DIAGNOSIS — S00.83XA CONTUSION OF OTHER PART OF HEAD, INITIAL ENCOUNTER: Primary | ICD-10-CM

## 2025-08-06 DIAGNOSIS — S01.01XA LACERATION OF SCALP WITHOUT FOREIGN BODY, INITIAL ENCOUNTER: ICD-10-CM

## 2025-08-06 PROCEDURE — A9150 MISC/EXPER NON-PRESCRIPT DRU: HCPCS

## 2025-08-06 PROCEDURE — 70450 CT HEAD/BRAIN W/O DYE: CPT

## 2025-08-06 PROCEDURE — 10002803 HB RX 637

## 2025-08-06 RX ORDER — LAMOTRIGINE 100 MG/1
200 TABLET ORAL ONCE
Status: COMPLETED | OUTPATIENT
Start: 2025-08-06 | End: 2025-08-06

## 2025-08-06 RX ORDER — ACETAMINOPHEN 325 MG/1
650 TABLET ORAL ONCE
Status: COMPLETED | OUTPATIENT
Start: 2025-08-06 | End: 2025-08-06

## 2025-08-06 RX ADMIN — ACETAMINOPHEN 650 MG: 325 TABLET ORAL at 01:22

## 2025-08-06 RX ADMIN — LAMOTRIGINE 200 MG: 100 TABLET ORAL at 01:03

## 2025-08-06 ASSESSMENT — PAIN SCALES - GENERAL: PAINLEVEL_OUTOF10: 0

## 2025-10-07 ENCOUNTER — APPOINTMENT (OUTPATIENT)
Dept: CARDIOLOGY | Age: 65
End: 2025-10-07

## (undated) DIAGNOSIS — R63.2 BINGE EATING: Primary | ICD-10-CM

## (undated) DIAGNOSIS — R63.2 BINGE EATING: ICD-10-CM

## (undated) DEVICE — STERILE POLYISOPRENE POWDER-FREE SURGICAL GLOVES: Brand: PROTEXIS

## (undated) DEVICE — UNDYED BRAIDED (POLYGLACTIN 910), SYNTHETIC ABSORBABLE SUTURE: Brand: COATED VICRYL

## (undated) DEVICE — 3M™ COBAN™ NL STERILE NON-LATEX SELF-ADHERENT WRAP, 2084S, 4 IN X 5 YD (10 CM X 4,5 M), 18 ROLLS/CASE: Brand: 3M™ COBAN™

## (undated) DEVICE — SUTURE ETHILON 3-0 PS-1

## (undated) DEVICE — GLOVE SURG SENSICARE SZ 6-1/2

## (undated) DEVICE — SOL  .9 1000ML BTL

## (undated) DEVICE — Device

## (undated) DEVICE — PIN GUARD 0.045 WHITE

## (undated) DEVICE — GOWN SURG AERO CHROME XXL

## (undated) DEVICE — KENDALL SCD EXPRESS SLEEVES, KNEE LENGTH, MEDIUM: Brand: KENDALL SCD

## (undated) DEVICE — GAMMEX® PI HYBRID SIZE 6.5, STERILE POWDER-FREE SURGICAL GLOVE, POLYISOPRENE AND NEOPRENE BLEND: Brand: GAMMEX

## (undated) DEVICE — SUTURE VICRYL 3-0 CT-2

## (undated) DEVICE — 4.0MM OVAL CARBIDE BUR

## (undated) DEVICE — LOWER EXTREMITY CDS-LF: Brand: MEDLINE INDUSTRIES, INC.

## (undated) DEVICE — OCCLUSIVE GAUZE STRIP OVERWRAP,3% BISMUTH TRIBROMOPHENATE IN PETROLATUM BLEND: Brand: XEROFORM

## (undated) DEVICE — CHLORAPREP 26ML APPLICATOR

## (undated) DEVICE — DRAPE MINI C-ARM

## (undated) DEVICE — PIN GUARD 0.062 GREEN

## (undated) DEVICE — ZIMMER® STERILE DISPOSABLE TOURNIQUET CUFF WITH PLC, DUAL PORT, SINGLE BLADDER, 18 IN. (46 CM)

## (undated) DEVICE — BLADE SAW KM33-11

## (undated) NOTE — Clinical Note
Charlette Thakkar  I am going to increase Vaishali's Vyvanse to 70 mg. Hoping this will help with appetite and weight loss.  Thanks Kat [facility]

## (undated) NOTE — MR AVS SNAPSHOT
[facility] [facility] [place] [address] [phone]               Thank you for choosing us for your health care visit with Mata Gomez MD.  We are glad to serve you and happy to provide you with this summary of your v BP Pulse Height Weight BMI Breastfeeding?    130/77 mmHg 105 5' 1\" (1.549 m) 234 lb 2 oz 44.26 kg/m2 No         Current Medications          This list is accurate as of: 3/24/17  1:08 PM.  Always use your most recent med list.                ALPRAZolam 1 SWISH AND SWALLOW OR SPIT 5 TO 15 ML PO Q 6 TO 8 H PRF MOUTH PAIN   Commonly known as:  XYLOCAINE           Linaclotide 145 MCG Caps   Take 1 capsule by mouth once daily.    Commonly known as:  LINZESS           * Lisdexamfetamine Dimesylate 60 MG Caps   Ta Commonly known as:  KENALOG           triamcinolone acetonide 0.1 % Pste   APPLY AA IN MOUTH TID FOR 2 WEEKS   Commonly known as:  ORALONE           ValACYclovir HCl 500 MG Tabs   Take 1 tablet (500 mg total) by mouth daily.    Commonly known as:  VALTREX

## (undated) NOTE — MR AVS SNAPSHOT
[address] [facility]  [address] [phone]               Thank you for choosing us for your health care visit with Hui Lagunas MD.  We are glad to serve you and happy to provide you with th palpitations                Today's Vital Signs     BP Pulse Height Weight BMI Breastfeeding?     111/82 mmHg 96 61\" 226 lb (102.513 kg) 42.72 kg/m2 No         Current Medications          This list is accurate as of: 1/24/17  2:43 PM.  Always use your mo Take 1 capsule (50 mg total) by mouth every morning. Commonly known as:  VYVANSE   Start taking on:  2/24/2017           * morphINE Sulfate ER 60 MG Tbcr   Take 1 tablet (60 mg total) by mouth every 12 (twelve) hours.    What changed:  how much to take These medications were sent to [facility] [address], [address], [address], [address] [facility], [address] [address]     Phone:  [phone]    - colchicine 0.6 MG Tabs  - Cyclobenzaprine HCl 10 M

## (undated) NOTE — IP AVS SNAPSHOT
[facility] [place] [address] [place], [address] ~ [phone]                Discharge Summary   6/20/2017    Avtar Nipple           Admission Information        Provider Department    6/20/2017 Saúl Montes MD  Jameel Tang / Kilo Nassar [    ]    [    ]    [    ]       Cyclobenzaprine HCl 10 MG Tabs   Commonly known as:  cyclobenzaprine        Take 1 tablet (10 mg total) by mouth 3 (three) times daily as needed for Muscle spasms.     Garett Smiley     [    ]    [    ]    [    ]    [ SWISH AND SWALLOW OR SPIT 5 TO 15 ML PO Q 6 TO 8 H PRF MOUTH PAIN      [    ]    [    ]    [    ]    [    ]       Linaclotide 145 MCG Caps   Commonly known as:  LINZESS        Take 1 capsule by mouth once daily.     Suzannastmary Garcia     [    ]    [    ]    [ [    ]    [    ]    [    ]       torsemide 20 MG Tabs   Commonly known as:  DEMADEX        Take 40 mg by mouth every morning.       [    ]    [    ]    [    ]    [    ]       torsemide 20 MG Tabs   Commonly known as:  DEMADEX        Take 20 mg by mouth nig Alcoholic beverages should be avoided for:  · 24 hours after Anesthesia/Sedation    Call the doctor for:  · Elevated Temperature  · Bleeding  · Nausea/Vomiting  · Pain not relieved with pain medication    For life threatening emergencies (unexpected chest 48.8 (05/12/17)  34.2 (05/12/17)  11.0 (05/12/17)  5.3   (05/12/17)  2.23 (05/12/17)  1.56 (05/12/17)  0.50 (05/12/17)  0.24 (05/12/17)  5.85      Metabolic Lab Results  (Last result in the past 90 days)    HgbA1C Glucose BUN Creatinine Calcium Alkaline Ph If you've recently had a stay at the Hospital you can access your discharge instructions in [employer] by going to Visits < Admission Summaries.  If you've been to the Emergency Department or your doctor's office, you can view your past visit information in My

## (undated) NOTE — Clinical Note
Anthony Camacho has been seeing me in regards to her weight for the past few years. She mentioned that you wanted her to lose some weight for a surgical procedure. How much would you like her to lose, so we can continue to work at it.   Thanks  Renetta Cadena MD

## (undated) NOTE — LETTER
Last Revised 02/07/06  Obstructive Sleep Apnea Questionnaire    Clinical signs and symptoms suggesting the possibility of BRISA    1. Predisposing physical characteristics (positive with any of the following present)  ? BMI 35kg/m²  ?  Craniofacial abnormalit pauses which are frightening to the observer, patient regularly falls asleep within minutes after being left unstimulated) in which case they should be treated as though they have severe sleep apnea.     The sleep laboratory’s assessment (none, mild, modera C. Requirement for postoperative opioids.                Opioid requirement             Points   None 0    Low dose oral opiod 1    High dose oral opioids, parenteral or neuraxial opiods 3      Point Total for C        Estimation of Perioperative Ris

## (undated) NOTE — MR AVS SNAPSHOT
[facility] [place] [address] [phone]               Thank you for choosing us for your health care visit with Angelika Aquino MD.  We are glad to serve you and happy to provide you with this summary of your v Instructions and Information about Your Health    Please review assessment and plan.           Allergies as of May 09, 2017     Latex Itching, Swelling    blisters  redness    Tequin [Gatifloxacin] Anaphylaxis    Facial swelling 5/03      Adhesive Tape Rash Generic drug:  Glucose Blood   TEST SUGARS EVERY MORNING, ALTERNATING WITH 2 HOURS POST PRANDIAL DAILY           * gabapentin 100 MG Caps   Take 2 capsules (200 mg total) by mouth 3 (three) times daily.    Commonly known as:  NEURONTIN           * gabapenti * Prochlorperazine Maleate 10 mg tablet   TAKE 1 TABLET BY MOUTH EVERY 6 HOURS AS NEEDED   Commonly known as:  COMPAZINE           * torsemide 20 MG Tabs   Take 40 mg by mouth every morning.    Commonly known as:  DEMADEX           * torsemide 20 MG Tabs view more details from this visit by going to [email or web address]. If you've recently had a stay at the Hospital you can access your discharge instructions in [employer] by going to Visits < Admission Summaries.  If you've been to the Emergency Depar

## (undated) NOTE — LETTER
[facility] Testing Department  Phone: [phone]  OUTSIDE TESTING RESULT REQUEST      TO:   Dr Jean Mchugh Date: 6/9/17    FAX #: [phone]     IMPORTANT: FOR YOUR IMMEDIATE ATTENTION  Please FAX all test results listed below to: 61